# Patient Record
Sex: FEMALE | Race: BLACK OR AFRICAN AMERICAN | NOT HISPANIC OR LATINO | Employment: FULL TIME | URBAN - METROPOLITAN AREA
[De-identification: names, ages, dates, MRNs, and addresses within clinical notes are randomized per-mention and may not be internally consistent; named-entity substitution may affect disease eponyms.]

---

## 2017-05-19 ENCOUNTER — OFFICE VISIT (OUTPATIENT)
Dept: INTERNAL MEDICINE | Facility: CLINIC | Age: 32
End: 2017-05-19
Attending: INTERNAL MEDICINE
Payer: COMMERCIAL

## 2017-05-19 VITALS
DIASTOLIC BLOOD PRESSURE: 68 MMHG | OXYGEN SATURATION: 97 % | HEIGHT: 63 IN | SYSTOLIC BLOOD PRESSURE: 118 MMHG | HEART RATE: 72 BPM | BODY MASS INDEX: 37.97 KG/M2 | WEIGHT: 214.31 LBS

## 2017-05-19 DIAGNOSIS — E66.9 OBESITY (BMI 35.0-39.9 WITHOUT COMORBIDITY): ICD-10-CM

## 2017-05-19 DIAGNOSIS — E22.1 HYPERPROLACTINEMIA: ICD-10-CM

## 2017-05-19 DIAGNOSIS — Z00.00 ANNUAL PHYSICAL EXAM: Primary | ICD-10-CM

## 2017-05-19 DIAGNOSIS — Z86.39 HISTORY OF THYROID NODULE: ICD-10-CM

## 2017-05-19 PROCEDURE — 99395 PREV VISIT EST AGE 18-39: CPT | Mod: S$GLB,,, | Performed by: INTERNAL MEDICINE

## 2017-05-19 PROCEDURE — 99999 PR PBB SHADOW E&M-EST. PATIENT-LVL III: CPT | Mod: PBBFAC,,, | Performed by: INTERNAL MEDICINE

## 2017-05-19 NOTE — MR AVS SNAPSHOT
[facility]  [address]  [place] [address]  Phone: [phone]  Fax: [phone]                  Meghna Aragon   2017 11:20 AM   Office Visit    Description:  Female : 1985   Provider:  Maude Fernandez MD   Department:  [facility]           Reason for Visit     Establish Care     Annual Exam           Diagnoses this Visit        Comments    Annual physical exam    -  Primary     Hyperprolactinemia         Obesity (BMI 35.0-39.9 without comorbidity)         History of thyroid nodule                To Do List           Future Appointments        Provider Department Dept Phone    2017 8:30 AM LAB, SAME DAY [facility] [phone]    2017 12:00 PM [facility] [place] USOP2 [facility] [phone]      Goals (5 Years of Data)     None      Follow-Up and Disposition     Return in about 1 year (around 2018), or if symptoms worsen or fail to improve.      [facility]     [facility] Nurse Care Line -  Assistance  Unless otherwise directed by your provider, please contact [facility], our nurse care line that is available for  assistance.     Registered nurses in the [facility] provide: appointment scheduling, clinical advisement, health education, and other advisory services.  Call: [phone] (toll free)               Medications           Message regarding Medications     Verify the changes and/or additions to your medication regime listed below are the same as discussed with your clinician today.  If any of these changes or additions are incorrect, please notify your healthcare provider.             Verify that the below list of medications is an accurate representation of the medications you are currently taking.  If none reported, the list may be blank. If incorrect, please contact your healthcare provider. Carry this list with you in case of emergency.          "  Current Medications     norethindrone (ORTHO MICRONOR) 0.35 mg tablet Take 1 tablet (0.35 mg total) by mouth once daily.    PNV NO10/IRON FUM&P/FA/OMEGA-3 (PNV #10-IRON [id number]&[id number]-RT-[id number] ORAL) Take by mouth.           Clinical Reference Information           Your Vitals Were     BP Pulse Height Weight SpO2 BMI    118/68 72 5' 3" (1.6 m) 97.2 kg (214 lb 4.6 oz) 97% 37.96 kg/m2      Blood Pressure          Most Recent Value    BP  118/68      Allergies as of 5/19/2017     Codeine      Immunizations Administered on Date of Encounter - 5/19/2017     None      Orders Placed During Today's Visit      Normal Orders This Visit    Ambulatory Referral to Endocrinology     Future Labs/Procedures Expected by Expires    CBC auto differential  5/19/2017 5/19/2018    Comprehensive metabolic panel  5/19/2017 5/19/2018    Lipid panel  5/19/2017 5/19/2018    Prolactin  5/19/2017 7/18/2018    TSH  5/19/2017 5/19/2018    US Soft Tissue Head Neck Thyroid  5/19/2017 5/19/2018      Language Assistance Services     ATTENTION: Language assistance services are available, free of charge. Please call [phone].      ATENCIÓN: Si habla candace, tiene a shahid disposición servicios gratuitos de asistencia lingüística. Llame al [phone].     CHÚ Ý: N?u b?n nói Ti?ng Vi?t, có các d?ch v? h? tr? ngôn ng? mi?n phí dành cho b?n. G?i s? [phone].         [facility] complies with applicable Federal civil rights laws and does not discriminate on the basis of race, color, national origin, age, disability, or sex.        "

## 2017-05-19 NOTE — PROGRESS NOTES
"Subjective:       Patient ID: Meghna Aragon is a 32 y.o. female.    Chief Complaint: Establish Care and Annual Exam    HPI   Pt here to [facility] care and for annual exam.   Hx of hyperprolactinemia treated with bromocriptine in past - MRI neg for pit mass.   Stopped med when became pregnant. No longer taking this.   On ocp and last period was April 5 and was 4 days - not having period monthly   Reports hx of small thyroid nodules dx in high school - reports many years since had thyroid imaged. When initially dx per pt "they were too small to do anything about"  Feeling well overall. No complaints. No unexpected wt loss - not getting reg exercise and + difficulty with losing wt    Review of Systems   Constitutional: Positive for unexpected weight change. Negative for activity change.   HENT: Negative for hearing loss, rhinorrhea and trouble swallowing.    Eyes: Negative for discharge and visual disturbance.   Respiratory: Negative for chest tightness and wheezing.    Cardiovascular: Negative for chest pain and palpitations.   Gastrointestinal: Negative for blood in stool, constipation, diarrhea and vomiting.   Endocrine: Negative for polydipsia and polyuria.   Genitourinary: Positive for menstrual problem. Negative for difficulty urinating, dysuria and hematuria.   Musculoskeletal: Negative for arthralgias and joint swelling.   Neurological: Negative for weakness and headaches.   Psychiatric/Behavioral: Negative for confusion and dysphoric mood.       Objective:      Physical Exam   Constitutional: She is oriented to person, place, and time. She appears well-developed and well-nourished.   HENT:   Head: Normocephalic and atraumatic.   Right Ear: External ear normal.   Left Ear: External ear normal.   Nose: Nose normal.   Mouth/Throat: Oropharynx is clear and moist. No oropharyngeal exudate.   No carotid bruits   Eyes: Conjunctivae and EOM are normal. Pupils are equal, round, and reactive to light.   Neck: Neck " supple. No thyromegaly present.   Cardiovascular: Normal rate, regular rhythm, normal heart sounds and intact distal pulses.    Pulmonary/Chest: Effort normal and breath sounds normal.   Abdominal: Soft. Bowel sounds are normal.   Musculoskeletal: She exhibits no edema or tenderness.   Visual field test wnl on my exam   Lymphadenopathy:     She has no cervical adenopathy.   Neurological: She is alert and oriented to person, place, and time. Coordination normal.   Skin: Skin is warm and dry.   Psychiatric: She has a normal mood and affect. Her behavior is normal. Judgment and thought content normal.       Assessment:     Meghna was seen today for establish care and annual exam.    Diagnoses and all orders for this visit:    Annual physical exam  -     CBC auto differential; Future  -     Comprehensive metabolic panel; Future  -     TSH; Future  -     Lipid panel; Future  Recommend daily sunscreen, cardiovascular exercise min 30 min 5 days per week. Seatbelts routinely.    Hyperprolactinemia: if elevated will f/u with endocrine if new gyn does not prescribe bromocriptine and prolactin level is elevated - had MRI in 2015 and neg for pit mass  -     Prolactin; Future  -     Ambulatory Referral to Endocrinology    Obesity (BMI 35.0-39.9 without comorbidity):   - cont diet and exercise  - increase intensity and duration of CV exercise to continue weight loss  - goal wt loss one pound per week  - portion control, healthy choices    History of thyroid nodule  -     US Soft Tissue Head Neck Thyroid; Future

## 2017-05-24 ENCOUNTER — LAB VISIT (OUTPATIENT)
Dept: LAB | Facility: OTHER | Age: 32
End: 2017-05-24
Attending: INTERNAL MEDICINE
Payer: COMMERCIAL

## 2017-05-24 DIAGNOSIS — E22.1 HYPERPROLACTINEMIA: ICD-10-CM

## 2017-05-24 DIAGNOSIS — Z00.00 ANNUAL PHYSICAL EXAM: ICD-10-CM

## 2017-05-24 LAB
ALBUMIN SERPL BCP-MCNC: 3.7 G/DL
ALP SERPL-CCNC: 83 U/L
ALT SERPL W/O P-5'-P-CCNC: 16 U/L
ANION GAP SERPL CALC-SCNC: 14 MMOL/L
AST SERPL-CCNC: 22 U/L
BASOPHILS # BLD AUTO: 0.01 K/UL
BASOPHILS NFR BLD: 0.2 %
BILIRUB SERPL-MCNC: 0.2 MG/DL
BUN SERPL-MCNC: 14 MG/DL
CALCIUM SERPL-MCNC: 9.3 MG/DL
CHLORIDE SERPL-SCNC: 106 MMOL/L
CHOLEST/HDLC SERPL: 4.3 {RATIO}
CO2 SERPL-SCNC: 19 MMOL/L
CREAT SERPL-MCNC: 0.8 MG/DL
DIFFERENTIAL METHOD: ABNORMAL
EOSINOPHIL # BLD AUTO: 0.1 K/UL
EOSINOPHIL NFR BLD: 1.5 %
ERYTHROCYTE [DISTWIDTH] IN BLOOD BY AUTOMATED COUNT: 14.2 %
EST. GFR  (AFRICAN AMERICAN): >60 ML/MIN/1.73 M^2
EST. GFR  (NON AFRICAN AMERICAN): >60 ML/MIN/1.73 M^2
GLUCOSE SERPL-MCNC: 96 MG/DL
HCT VFR BLD AUTO: 38.3 %
HDL/CHOLESTEROL RATIO: 23 %
HDLC SERPL-MCNC: 230 MG/DL
HDLC SERPL-MCNC: 53 MG/DL
HGB BLD-MCNC: 12.2 G/DL
LDLC SERPL CALC-MCNC: 152.4 MG/DL
LYMPHOCYTES # BLD AUTO: 2.1 K/UL
LYMPHOCYTES NFR BLD: 32.3 %
MCH RBC QN AUTO: 26.8 PG
MCHC RBC AUTO-ENTMCNC: 31.9 %
MCV RBC AUTO: 84 FL
MONOCYTES # BLD AUTO: 0.4 K/UL
MONOCYTES NFR BLD: 5.9 %
NEUTROPHILS # BLD AUTO: 4 K/UL
NEUTROPHILS NFR BLD: 59.8 %
NONHDLC SERPL-MCNC: 177 MG/DL
PLATELET # BLD AUTO: 265 K/UL
PMV BLD AUTO: 11.9 FL
POTASSIUM SERPL-SCNC: 4.5 MMOL/L
PROLACTIN SERPL IA-MCNC: 56.9 NG/ML
PROT SERPL-MCNC: 7.4 G/DL
RBC # BLD AUTO: 4.56 M/UL
SODIUM SERPL-SCNC: 139 MMOL/L
TRIGL SERPL-MCNC: 123 MG/DL
TSH SERPL DL<=0.005 MIU/L-ACNC: 1.25 UIU/ML
WBC # BLD AUTO: 6.62 K/UL

## 2017-05-24 PROCEDURE — 84443 ASSAY THYROID STIM HORMONE: CPT

## 2017-05-24 PROCEDURE — 36415 COLL VENOUS BLD VENIPUNCTURE: CPT

## 2017-05-24 PROCEDURE — 84146 ASSAY OF PROLACTIN: CPT

## 2017-05-24 PROCEDURE — 85025 COMPLETE CBC W/AUTO DIFF WBC: CPT

## 2017-05-24 PROCEDURE — 80061 LIPID PANEL: CPT

## 2017-05-24 PROCEDURE — 80053 COMPREHEN METABOLIC PANEL: CPT

## 2017-05-25 ENCOUNTER — HOSPITAL ENCOUNTER (OUTPATIENT)
Dept: RADIOLOGY | Facility: OTHER | Age: 32
Discharge: HOME | End: 2017-05-25
Attending: INTERNAL MEDICINE
Payer: COMMERCIAL

## 2017-05-25 DIAGNOSIS — Z86.39 HISTORY OF THYROID NODULE: ICD-10-CM

## 2017-05-25 PROCEDURE — 76536 US EXAM OF HEAD AND NECK: CPT | Mod: 26,,, | Performed by: RADIOLOGY

## 2017-05-25 PROCEDURE — 76536 US EXAM OF HEAD AND NECK: CPT | Mod: TC

## 2017-06-02 ENCOUNTER — OFFICE VISIT (OUTPATIENT)
Dept: ENDOCRINOLOGY | Facility: CLINIC | Age: 32
End: 2017-06-02
Payer: COMMERCIAL

## 2017-06-02 VITALS
WEIGHT: 216.94 LBS | BODY MASS INDEX: 38.44 KG/M2 | RESPIRATION RATE: 16 BRPM | HEART RATE: 71 BPM | HEIGHT: 63 IN | SYSTOLIC BLOOD PRESSURE: 139 MMHG | DIASTOLIC BLOOD PRESSURE: 85 MMHG

## 2017-06-02 DIAGNOSIS — N64.3 GALACTORRHEA: ICD-10-CM

## 2017-06-02 DIAGNOSIS — Z86.39 HISTORY OF THYROID NODULE: ICD-10-CM

## 2017-06-02 DIAGNOSIS — E22.1 HYPERPROLACTINEMIA: Primary | ICD-10-CM

## 2017-06-02 PROCEDURE — 99204 OFFICE O/P NEW MOD 45 MIN: CPT | Mod: S$GLB,,, | Performed by: INTERNAL MEDICINE

## 2017-06-02 PROCEDURE — 99999 PR PBB SHADOW E&M-EST. PATIENT-LVL III: CPT | Mod: PBBFAC,,, | Performed by: INTERNAL MEDICINE

## 2017-06-02 NOTE — PROGRESS NOTES
Subjective:      Patient ID: Meghna Aragon is a 32 y.o. female.    Chief Complaint:  Hyperprolactinemia      History of Present Illness    Ms.Yasmin Duglas Aragon is seen as a new patient referred to me for hyperprolactinemia     She has an 8 months old baby boy     Works at [employer]     She was trying to get pregnant in 2014 for couple of years but did not get pregnant so she was evaluated for casues found to have slight increase in prolactin   Was started on bromocriptine 2.5 mg daily oral     She got pregnant through ivf     She had irregular menses   She was getting every other month - not consistent     She did have galactorrhea before the diagnosis     She was breastfeeding EBF for 3 motnhs, after that added formula, weaning off   Last BF 2 weeks ago            Review of Systems   Constitutional: Negative for unexpected weight change.   HENT: Negative for trouble swallowing and voice change.    Eyes: Negative for visual disturbance.   Respiratory: Negative for shortness of breath.    Gastrointestinal: Negative for constipation and diarrhea.   Genitourinary: Positive for menstrual problem (per hpi).   Neurological: Positive for headaches. Negative for dizziness.   Hematological: Does not bruise/bleed easily.   Psychiatric/Behavioral: Positive for sleep disturbance (due to baby not sleeping through the night ).       Objective:   Physical Exam   Constitutional: She appears well-developed.   HENT:   Right Ear: External ear normal.   Left Ear: External ear normal.   Nose: Nose normal.   Neck: No tracheal deviation present. No thyromegaly present.   Cardiovascular: Normal rate.    No murmur heard.  Pulmonary/Chest: Effort normal and breath sounds normal.   Abdominal: Soft. There is no tenderness. No hernia.   Musculoskeletal: She exhibits no edema.   Neurological: She displays normal reflexes. No cranial nerve deficit.   Skin: No rash noted.          Psychiatric: She has a normal mood and affect. Judgment  normal.   Vitals reviewed.      Lab Review:   Results for ANTHONY CEJA (MRN [id number]) as of 6/2/2017 11:36   Ref. Range 10/11/2014 08:50 10/14/2014 08:27 11/7/2014 08:04 11/21/2014 08:11 5/24/2017 08:10   FSH Latest Ref Range: See Text mIU/mL 1.80       Prolactin Latest Ref Range: 5.2 - 26.5 ng/mL 70.0 (H) 58.8 (H) 26.6 (H) 17.5 56.9 (H)       Procedure: MRI brain.  None.    Technique: Multiplanar, multi-sequence imaging was performed from the vertex to the skull base prior to and following the administration of 10 cc of intraveneous gadavist.  High-resolution images were obtained through the pituitary gland.    Findings: There is normal brain parenchymal signal intensity with no intra-axial or extra axial mass or hemorrhage.  There is no restricted diffusion to suggest acute ischemia or infarct.  There is no midline shift.  The gray-white matter differentiation   is intact.  The CSF containing spaces maintained normal size, symmetry and volume.  The midline structures are intact. Typically, there is no evidence of a pituitary lesion. The vascular flow voids are patent. Evaluation of the pneumatized aspects of   the skull and skull base show no abnormalities.  The osseous and soft tissue structures are normal.   Impression      Normal exam.      Electronically signed by: ROMINA HAWLEY MD  Date: 10/27/14  Time: 13:16      Sonographic evaluation of the thyroid gland was performed. The thyroid gland is normal in overall size with the right lobe of the thyroid gland measuring 5.3 x 1.5 x 1.4 cm and the left lobe of the thyroid gland measuring 4.6 x 1.7 x 1.1 cm. There is a mixed solid and cystic nodule identified within the caudal aspect of the right lobe of the thyroid gland measuring 0.5 cm in size. There is a small solid nodule identified within the medial aspect of the upper pole of the left lobe of the thyroid gland measuring 0.6 cm. These thyroid nodules do not meet the imaging criteria for  recommendation for biopsy at this time. There are normal size lymph nodes identified within the neck.   Impression       Normal size thyroid gland containing subcentimeter nodules as detailed above. Note that the thyroid nodules do not meet imaging criteria for recommendation for biopsy at this time.      Electronically signed by: GABRIELLE ROBERTS MD  Date: 05/25/17  Time: 14:36          Assessment:     1. Hyperprolactinemia  Prolactin   2. History of thyroid nodule          # hyperprolactinemia   She has h/o hyperprolactinemia prior to pregnnacy   currently in process of weaning of BF   We will wait till she is completely done weaning BF   Recheck prolactin prior to next visit   RTC in 6 months     # thyroid nodules   Repeat US tyroid in 1-2 years       Plan:

## 2017-10-27 ENCOUNTER — PATIENT MESSAGE (OUTPATIENT)
Dept: INTERNAL MEDICINE | Facility: CLINIC | Age: 32
End: 2017-10-27

## 2018-04-27 ENCOUNTER — OFFICE VISIT (OUTPATIENT)
Dept: INTERNAL MEDICINE | Facility: CLINIC | Age: 33
End: 2018-04-27
Attending: INTERNAL MEDICINE
Payer: COMMERCIAL

## 2018-04-27 ENCOUNTER — TELEPHONE (OUTPATIENT)
Dept: INTERNAL MEDICINE | Facility: CLINIC | Age: 33
End: 2018-04-27

## 2018-04-27 VITALS
HEART RATE: 80 BPM | HEIGHT: 63 IN | DIASTOLIC BLOOD PRESSURE: 81 MMHG | BODY MASS INDEX: 35.16 KG/M2 | WEIGHT: 198.44 LBS | SYSTOLIC BLOOD PRESSURE: 124 MMHG | OXYGEN SATURATION: 98 %

## 2018-04-27 DIAGNOSIS — E04.1 THYROID NODULE: ICD-10-CM

## 2018-04-27 DIAGNOSIS — E78.5 HYPERLIPIDEMIA, UNSPECIFIED HYPERLIPIDEMIA TYPE: ICD-10-CM

## 2018-04-27 DIAGNOSIS — Z00.00 ANNUAL PHYSICAL EXAM: Primary | ICD-10-CM

## 2018-04-27 DIAGNOSIS — Z82.49 FAMILY HISTORY OF HEART DISEASE: Primary | ICD-10-CM

## 2018-04-27 DIAGNOSIS — E22.1 HYPERPROLACTINEMIA: ICD-10-CM

## 2018-04-27 DIAGNOSIS — Z82.49 FAMILY HISTORY OF EARLY CAD: ICD-10-CM

## 2018-04-27 PROCEDURE — 99999 PR PBB SHADOW E&M-EST. PATIENT-LVL IV: CPT | Mod: PBBFAC,,, | Performed by: INTERNAL MEDICINE

## 2018-04-27 PROCEDURE — 99395 PREV VISIT EST AGE 18-39: CPT | Mod: S$GLB,,, | Performed by: INTERNAL MEDICINE

## 2018-04-27 NOTE — PROGRESS NOTES
"Subjective:   Patient ID: Meghna Aragon is a 32 y.o. female  Chief complaint:   Chief Complaint   Patient presents with    Annual Exam       HPI   Pt here for annual exam     She is concerned about family hx of CAD  Dad with hx of MI at 44 yoa  1/2 brother had MI s/p stents at age 32 - this occurred April 1, 2018 - she reports he was involved in ilicit drugs but unsure if cocaine or other stimulants and if this contributed to MI    HLD: reviewed labs from last year  Anticipating pregnancy in the next year    Elevated proloactin: did see endocrine 6/2017   No galactorrhea at this time. No longer breastfeeding - is due to f/u    Thyroid nodules: due for repeat us 5/25/2018, last tsh wnl    Gyn: needs to est care    Has improved diet - eating healthy diet and lost wt due to healthier lifestyle  She denies any cp or tightness or sob    Review of Systems    Objective:  Vitals:    04/27/18 0857   BP: 124/81   Pulse: 80   SpO2: 98%   Weight: 90 kg (198 lb 6.6 oz)   Height: 5' 3" (1.6 m)     Body mass index is 35.15 kg/m².    Physical Exam   Constitutional: She is oriented to person, place, and time. She appears well-developed and well-nourished.   HENT:   Head: Normocephalic and atraumatic.   Right Ear: External ear normal.   Left Ear: External ear normal.   Nose: Nose normal.   Mouth/Throat: Oropharynx is clear and moist. No oropharyngeal exudate.   No carotid bruits   Eyes: Conjunctivae and EOM are normal.   Neck: Neck supple. No thyromegaly present.   Cardiovascular: Normal rate, regular rhythm, normal heart sounds and intact distal pulses.    Pulmonary/Chest: Effort normal and breath sounds normal.   Abdominal: Soft. Bowel sounds are normal.   Musculoskeletal: She exhibits no edema or tenderness.   Lymphadenopathy:     She has no cervical adenopathy.   Neurological: She is alert and oriented to person, place, and time.   Skin: Skin is warm and dry.   Psychiatric: Her behavior is normal. Thought content normal. "   Vitals reviewed.      Assessment:  1. Annual physical exam    2. Hyperprolactinemia    3. Thyroid nodule    4. Hyperlipidemia, unspecified hyperlipidemia type    5. Family history of early CAD        Plan:  Meghna was seen today for annual exam.    Diagnoses and all orders for this visit:    Annual physical exam  -     Ambulatory Referral to Obstetrics / Gynecology  -     Lipid panel; Future  -     CBC auto differential; Future  -     Comprehensive metabolic panel; Future  -     TSH; Future  Recommend daily sunscreen, cardiovascular exercise min 30 min 5 days per week. Seatbelts routinely.    Hyperprolactinemia  -     Ambulatory Referral to Endocrinology  Check level     Thyroid nodule  -     US Soft Tissue Head Neck Thyroid; Future    HLD: consider treatment in future after pregnancy/breast feeding due to early family hx of CAD and HLD - I recommend regular exercise along with a diet low in fat and cholesterol and high in fiber and fresh fruits and vegetables.     Family hx of CAD: as above     Health Maintenance   Topic Date Due    Pap Smear with HPV Cotest  11/10/2019    TETANUS VACCINE  09/01/2026    Influenza Vaccine  Completed    Lipid Panel  Completed

## 2018-04-27 NOTE — TELEPHONE ENCOUNTER
Pt stated that during her visit her and PCP discussed her family history and recent family issues and she stated that she forgot to ask PCP if an echo test can be put in for her to complete   .  Order pending (check to make sure its the correct one)  Please authorize and advise

## 2018-05-04 ENCOUNTER — HOSPITAL ENCOUNTER (OUTPATIENT)
Dept: CARDIOLOGY | Facility: OTHER | Age: 33
Discharge: HOME | End: 2018-05-04
Attending: INTERNAL MEDICINE
Payer: COMMERCIAL

## 2018-05-04 DIAGNOSIS — Z82.49 FAMILY HISTORY OF HEART DISEASE: ICD-10-CM

## 2018-05-04 LAB
DIASTOLIC DYSFUNCTION: NO
GLOBAL PERICARDIAL EFFUSION: NORMAL
RETIRED EF AND QEF - SEE NOTES: 60 (ref 55–65)

## 2018-05-04 PROCEDURE — 93306 TTE W/DOPPLER COMPLETE: CPT

## 2018-05-18 ENCOUNTER — HOSPITAL ENCOUNTER (OUTPATIENT)
Dept: RADIOLOGY | Facility: OTHER | Age: 33
Discharge: HOME | End: 2018-05-18
Attending: INTERNAL MEDICINE
Payer: COMMERCIAL

## 2018-05-18 DIAGNOSIS — E04.1 THYROID NODULE: ICD-10-CM

## 2018-05-18 PROCEDURE — 76536 US EXAM OF HEAD AND NECK: CPT | Mod: 26,,, | Performed by: INTERNAL MEDICINE

## 2018-05-18 PROCEDURE — 76536 US EXAM OF HEAD AND NECK: CPT | Mod: TC

## 2018-06-26 ENCOUNTER — OFFICE VISIT (OUTPATIENT)
Dept: INTERNAL MEDICINE | Facility: CLINIC | Age: 33
End: 2018-06-26
Payer: COMMERCIAL

## 2018-06-26 VITALS
SYSTOLIC BLOOD PRESSURE: 114 MMHG | DIASTOLIC BLOOD PRESSURE: 86 MMHG | HEIGHT: 63 IN | HEART RATE: 72 BPM | WEIGHT: 198.44 LBS | BODY MASS INDEX: 35.16 KG/M2 | OXYGEN SATURATION: 99 %

## 2018-06-26 DIAGNOSIS — H10.9 BACTERIAL CONJUNCTIVITIS OF RIGHT EYE: Primary | ICD-10-CM

## 2018-06-26 PROCEDURE — 99999 PR PBB SHADOW E&M-EST. PATIENT-LVL III: CPT | Mod: PBBFAC,,, | Performed by: INTERNAL MEDICINE

## 2018-06-26 PROCEDURE — 99213 OFFICE O/P EST LOW 20 MIN: CPT | Mod: S$GLB,,, | Performed by: INTERNAL MEDICINE

## 2018-06-26 RX ORDER — POLYMYXIN B SULFATE AND TRIMETHOPRIM 1; 10000 MG/ML; [USP'U]/ML
1 SOLUTION OPHTHALMIC EVERY 6 HOURS
Qty: 1 BOTTLE | Refills: 0 | Status: SHIPPED | OUTPATIENT
Start: 2018-06-26 | End: 2018-07-03

## 2018-06-26 NOTE — LETTER
June 26, 2018      [facility]  [address]  [place] [address]  Phone: [phone]  Fax: [phone]       Patient: Meghna Aragon   YOB: 1985  Date of Visit: 06/26/2018    To Whom It May Concern:    Sandro Aragon  was at [facility] on 06/26/2018. She may return to work/school on 06/27/2018 with restrictions. If you have any questions or concerns, or if I can be of further assistance, please do not hesitate to contact me.    Sincerely,    Tianna Correia MA

## 2018-06-26 NOTE — PROGRESS NOTES
Subjective:       Patient ID: Meghna Aragon is a 33 y.o. female.    Chief Complaint: Eye Drainage (eye draining , itching, yellow mucus, irritated )    HPI Mrs. Aragon is a 33-year-old female who presents with a chief complaint of a red itchy eye.  The patient states that she began having a red itchy eye yesterday after lunch.  She then noted that there was stuff coming out of the eye.  She tried flushing her eye but this did not help much.  Her eye was crusted over this morning.  She states that it is uncomfortable and there is a burning feeling, like a dryness.  It does feel like there is something in the eye.  She denies any trauma to the eye.  She does not wear contacts.  She denies any vision changes such as blurry vision or double vision.  She denies any associated fever.  On review of systems she admits to some recent nasal congestion, runny nose, and cough.  Also had a itchy throat over the weekend.  She thinks that her red eye may be due to ALLERGY problems.  She has not had red eyes due to ALLERGIES in the past.  She takes Claritin for her ALLERGIES.    Review of Systems   Constitutional: Negative for fever.   HENT: Positive for congestion and rhinorrhea. Negative for sore throat.    Eyes: Positive for discharge, redness and itching. Negative for pain and visual disturbance.   Respiratory: Positive for cough.        Objective:      Physical Exam   Constitutional: She is oriented to person, place, and time. She appears well-developed and well-nourished. No distress.   HENT:   Head: Normocephalic and atraumatic.   Nose: Mucosal edema and rhinorrhea present.   Mouth/Throat: Posterior oropharyngeal erythema (mild) present. No oropharyngeal exudate or posterior oropharyngeal edema.   Eyes: Conjunctivae and EOM are normal. Pupils are equal, round, and reactive to light. Right eye exhibits discharge (clear). Left eye exhibits no discharge. No scleral icterus.   Extraocular movements are intact and  movements are not painful.  Patient has a clear discharge from the right eye.  She has injected sclera and conjunctiva in the right eye.  Pupil was equal round and reactive to light.  Vision is intact and clear in the right eye.  Patient has a normal funduscopic exam with a clear circular optic disc.  No foreign body.    Mild injection of sclera and conjunctiva noted in the left eye.  Otherwise no further abnormalities of the left eye.   Neurological: She is alert and oriented to person, place, and time.   Skin: Skin is warm and dry. She is not diaphoretic.   Psychiatric: She has a normal mood and affect. Her behavior is normal. Judgment and thought content normal.   Vitals reviewed.      Assessment:       1. Bacterial conjunctivitis of right eye        Plan:     #1 bacterial conjunctivitis of right eye  Polytrim eye drops, right eye, Q6H X one week  Advised her to discard any makeup used around the eye in the last 48 hours  Advised her to avoid touching both eyes and thus possibly spreading infection  Apply warm compress to eye for 30 min, TID  Discussed with patient that it could be viral in origin, in which case, there is no treatment and the left eye may become inflamed soon as well    RTC PRN

## 2018-06-26 NOTE — PATIENT INSTRUCTIONS
Polymyxin B; Trimethoprim eye drops, solution  What is this medicine?  POLYMYXIN B and TRIMETHOPRIM (suzie i MIX in B and trye METH oh prim) eye drops treat certain eye infections caused by bacteria.  How should I use this medicine?  This medicine is used in the eye. Follow the directions on the prescription label. Wash your hands before and after use. Tilt your head back slightly. Pull your lower eyelid down gently to form a pouch. Do not touch the tip of the dropper to your eye, fingertips, or other surface. Squeeze the prescribed number of drops into the pouch. Close the eye gently to spread the drops. Use your medicine at regular intervals. Do not take your medicine more often than directed. Use all of your medicine as directed even if you think your are better. Do not skip doses or stop your medicine early.  Talk to your pediatrician regarding the use of this medicine in children. While this drug may be prescribed for children and infants for selected conditions, precautions do apply.  What side effects may I notice from receiving this medicine?  Side effects that you should report to your doctor or health care professional as soon as possible:  · burning, stinging, or swelling  · change in vision or blurred vision that will not go away  · eye pain  · itching and redness  · rash  Side effects that usually do not require medical attention (report to your doctor or health care professional if they continue or are bothersome):  · temporary blurred vision after applying  · temporary watering or stinging  What may interact with this medicine?  Interactions are not expected. Do not use any other eye products without advice of your doctor or health care professional.  What if I miss a dose?  If you miss a dose, use it as soon as you can. If it is almost time for your next dose, use only that dose. Do not use double or extra doses.  Where should I keep my medicine?  Keep out of the reach of children.  Store at room  temperature 15 to 25 degrees C (59 to 77 degrees F). Protect from light. To prevent the spread of infection, it is best to throw away any unused eye drops after you finish the course of treatment. Throw away any unused medicine after the expiration date.  What should I tell my health care provider before I take this medicine?  They need to know if you have any of these conditions:  · wear contact lenses  · an unusual or allergic reaction to polymyxin B, trimethoprim, other medicines, foods, dyes, or preservatives  · pregnant or trying to get pregnant  · breast-feeding  What should I watch for while using this medicine?  Check with your doctor or health care professional if your condition does not get better after 5 days, or if it gets worse.  If you wear contact lenses, ask when you can use your lenses again.  A burning or stinging reaction that does not go away may mean you are allergic to this product. Stop use and call your doctor or health care professional.  To prevent the spread of infection, do not share eye products or other personal items with anyone else.  NOTE:This sheet is a summary. It may not cover all possible information. If you have questions about this medicine, talk to your doctor, pharmacist, or health care provider. Copyright© 2017 [employer]    I recommend using over the counter Systane Ultra eye drops for lubrication.

## 2018-08-01 ENCOUNTER — OFFICE VISIT (OUTPATIENT)
Dept: ENDOCRINOLOGY | Facility: CLINIC | Age: 33
End: 2018-08-01
Payer: COMMERCIAL

## 2018-08-01 VITALS
HEIGHT: 63 IN | HEART RATE: 78 BPM | BODY MASS INDEX: 34.96 KG/M2 | WEIGHT: 197.31 LBS | SYSTOLIC BLOOD PRESSURE: 128 MMHG | DIASTOLIC BLOOD PRESSURE: 90 MMHG

## 2018-08-01 DIAGNOSIS — E22.1 HYPERPROLACTINEMIA: Primary | ICD-10-CM

## 2018-08-01 DIAGNOSIS — E04.1 THYROID NODULE: ICD-10-CM

## 2018-08-01 PROBLEM — Z86.39 HISTORY OF THYROID NODULE: Status: RESOLVED | Noted: 2017-05-19 | Resolved: 2018-08-01

## 2018-08-01 PROCEDURE — 99999 PR PBB SHADOW E&M-EST. PATIENT-LVL III: CPT | Mod: PBBFAC,,, | Performed by: INTERNAL MEDICINE

## 2018-08-01 PROCEDURE — 99213 OFFICE O/P EST LOW 20 MIN: CPT | Mod: S$GLB,,, | Performed by: INTERNAL MEDICINE

## 2018-08-01 NOTE — PROGRESS NOTES
Subjective:      Patient ID: Meghna Aragon is a 33 y.o. female.    Chief Complaint:  Thyroid Problem      History of Present Illness  Ms.Yasmin Duglas Aragon presents for follow up of hyperprolactinemia and thyroid nodules. Last visit with Dr. Norman in 6/2017.     Was trying to conceive for one year. Had fertility eval which included prolactin. Prolactin was found to be elevated. Pituitary MRI was negative in 2014. She was on bromocriptine 2.5 mg daily up until Jan 2016 when she conceived.  was ultimately found to have low sperm count and the patient underwent IVF in Jan 2016.     At her last visit in 5/2017 prolactin was elevated but she was still breastfeeding. Stopped breastfeeding in 6/2017.     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      She got pregnant through ivf      Has monthly cycles occurring every 32 days.      Has intermittent breast tenderness. Occ galactorrhea.    Also found to have small thyroid nodules on ultrasound.   No family history of thyroid cancer.     Thyroid ultrasound 5/2018:  The thyroid gland is upper normal size measuring 4.8 x 1.6 x 2.2 cm on the right and 4.6 x 1.4 x 2 cm on the left.  Each lobe of the thyroid contains a single subcentimeter nodule.  There is no nodule which meets criteria for fine-needle aspiration or follow-up.  There is no abnormal lymphadenopathy in the neck.      Impression       Two subcentimeter thyroid nodules, not meeting criteria for fine-needle aspiration or follow-up ultrasound.       Review of Systems   Constitutional: Negative for chills and fever.   Gastrointestinal: Negative for nausea.   No CP  No SOB    Objective:   Physical Exam   Nursing note and vitals reviewed.  No thyromegaly  DTR's 2 +  No tremor  Heart RRR  No edema    Lab Review:   Results for MEGHNA ARAGON (MRN [id number]) as of 8/1/2018 11:22   Ref. Range 5/24/2017 08:10 5/18/2018 07:07   TSH Latest Ref Range: 0.400 - 4.000 uIU/mL 1.248 0.989   Prolactin Latest  Ref Range: 5.2 - 26.5 ng/mL 56.9 (H)        Assessment:     1. Hyperprolactinemia    2. Thyroid nodule      Plan:     1.) Patient with idiopathic hyperprolactinemia  --Menstrual cycles are regular at present  --She does have some occasional galactorrhea and breast tenderness but not bothersome  --Will repeat prolactin now  --If normal or minimally elevated will monitor  --No need to repeat pituitary MRI unless prolactin significantly elevated  --Explained to her that should the prolactin be elevated and she plans pregnancy again would consider restarting bromocriptine or cabergoline at that time    2.) Repeat thyroid ultrasound in 18-24 months  --No nodules meet FNA criteria  --No risk factors for thyroid cancer    Omar Recinos M.D. Staff Endocrinology

## 2018-08-03 ENCOUNTER — LAB VISIT (OUTPATIENT)
Dept: LAB | Facility: OTHER | Age: 33
End: 2018-08-03
Payer: COMMERCIAL

## 2018-08-03 DIAGNOSIS — E22.1 HYPERPROLACTINEMIA: ICD-10-CM

## 2018-08-03 LAB — PROLACTIN SERPL IA-MCNC: 55.1 NG/ML

## 2018-08-03 PROCEDURE — 36415 COLL VENOUS BLD VENIPUNCTURE: CPT

## 2018-08-03 PROCEDURE — 84146 ASSAY OF PROLACTIN: CPT

## 2018-08-08 ENCOUNTER — PATIENT MESSAGE (OUTPATIENT)
Dept: ENDOCRINOLOGY | Facility: CLINIC | Age: 33
End: 2018-08-08

## 2018-08-08 DIAGNOSIS — E22.1 HYPERPROLACTINEMIA: Primary | ICD-10-CM

## 2018-08-09 ENCOUNTER — PATIENT MESSAGE (OUTPATIENT)
Dept: ENDOCRINOLOGY | Facility: CLINIC | Age: 33
End: 2018-08-09

## 2018-08-24 ENCOUNTER — PATIENT MESSAGE (OUTPATIENT)
Dept: ENDOCRINOLOGY | Facility: CLINIC | Age: 33
End: 2018-08-24

## 2018-08-24 ENCOUNTER — HOSPITAL ENCOUNTER (OUTPATIENT)
Dept: RADIOLOGY | Facility: HOSPITAL | Age: 33
Discharge: HOME | End: 2018-08-24
Attending: INTERNAL MEDICINE
Payer: COMMERCIAL

## 2018-08-24 DIAGNOSIS — E22.1 HYPERPROLACTINEMIA: ICD-10-CM

## 2018-08-24 PROCEDURE — A9585 GADOBUTROL INJECTION: HCPCS | Performed by: INTERNAL MEDICINE

## 2018-08-24 PROCEDURE — 70553 MRI BRAIN STEM W/O & W/DYE: CPT | Mod: TC

## 2018-08-24 PROCEDURE — 70553 MRI BRAIN STEM W/O & W/DYE: CPT | Mod: 26,,, | Performed by: RADIOLOGY

## 2018-08-24 PROCEDURE — 25500020 PHARM REV CODE 255: Performed by: INTERNAL MEDICINE

## 2018-08-24 RX ORDER — GADOBUTROL 604.72 MG/ML
4 INJECTION INTRAVENOUS
Status: COMPLETED | OUTPATIENT
Start: 2018-08-24 | End: 2018-08-24

## 2018-08-24 RX ADMIN — GADOBUTROL 4 ML: 604.72 INJECTION INTRAVENOUS at 04:08

## 2018-08-30 ENCOUNTER — PATIENT MESSAGE (OUTPATIENT)
Dept: ENDOCRINOLOGY | Facility: CLINIC | Age: 33
End: 2018-08-30

## 2018-08-30 DIAGNOSIS — E22.1 HYPERPROLACTINEMIA: Primary | ICD-10-CM

## 2018-08-30 RX ORDER — CABERGOLINE 0.5 MG/1
0.25 TABLET ORAL
Qty: 4 TABLET | Refills: 5 | Status: SHIPPED | OUTPATIENT
Start: 2018-08-30 | End: 2019-03-06

## 2018-09-04 ENCOUNTER — PATIENT MESSAGE (OUTPATIENT)
Dept: ENDOCRINOLOGY | Facility: CLINIC | Age: 33
End: 2018-09-04

## 2018-10-05 ENCOUNTER — LAB VISIT (OUTPATIENT)
Dept: LAB | Facility: OTHER | Age: 33
End: 2018-10-05
Payer: COMMERCIAL

## 2018-10-05 ENCOUNTER — PATIENT MESSAGE (OUTPATIENT)
Dept: ENDOCRINOLOGY | Facility: CLINIC | Age: 33
End: 2018-10-05

## 2018-10-05 DIAGNOSIS — E22.1 HYPERPROLACTINEMIA: ICD-10-CM

## 2018-10-05 LAB — PROLACTIN SERPL IA-MCNC: 6.1 NG/ML

## 2018-10-05 PROCEDURE — 84146 ASSAY OF PROLACTIN: CPT

## 2018-10-05 PROCEDURE — 36415 COLL VENOUS BLD VENIPUNCTURE: CPT

## 2019-03-05 NOTE — PROGRESS NOTES
HISTORY OF PRESENT ILLNESS:    Meghna Aragon is a 33 y.o. female, , Patient's last menstrual period was 2018.,  presents for a routine exam and has no complaints. PAP DUE AND SUBMITTED.  ALSO REPORTS POSITIVE PREGNANCY TEST - BASED LMP, EDC 2019  PROLACTINOMA - STOP CABERGOLINE THROUGH PREGNANCY.  HAD T2 AND T3 HEADACHES - FIORICET PRN.    CaM DELIV SON 2016, 3RD DEGREE 40W INDX.  O POS    LAST VISIT CaM  PP:  Meghna Aragon is a 31 y.o. female  presents for a postpartum visit.  She is status post  6 weeks ago.  Her hospitalization was not complicated.  She is breastfeeding.  She desires oral progesterone-only contraceptive for contraception.  She denies postpartum depression.  She is no longer bleeding.    Past Medical History:   Diagnosis Date    Anemia     Hyperprolactinemia     MRI neg for pit mass    Hyperprolactinemia 2017    Multiple thyroid nodules     Ovarian cyst 2016    dermoid on MRI       Past Surgical History:   Procedure Laterality Date    MOUTH SURGERY         MEDICATIONS AND ALLERGIES:      Current Outpatient Medications:     PNV NO10/IRON FUM&P/FA/OMEGA-3 (PNV #10-IRON [id number] ORAL), Take by mouth., Disp: , Rfl:     Review of patient's allergies indicates:   Allergen Reactions    Codeine Itching       Family History   Problem Relation Age of Onset    Heart attacks under age 50 Father 44    Heart disease Father     Heart disease Paternal Grandmother     Hypertension Mother     Thyroid disease Mother     Heart disease Paternal Aunt     Heart disease Paternal Uncle     No Known Problems Sister     No Known Problems Brother     No Known Problems Son     No Known Problems Brother     No Known Problems Brother     No Known Problems Brother     Cancer Neg Hx     Diabetes Neg Hx     Stroke Neg Hx     Thyroid cancer Neg Hx     Breast cancer Neg Hx     Colon cancer Neg Hx     Ovarian cancer Neg Hx        Social  History     Socioeconomic History    Marital status:      Spouse name: Not on file    Number of children: Not on file    Years of education: Not on file    Highest education level: Not on file   Social Needs    Financial resource strain: Not on file    Food insecurity - worry: Not on file    Food insecurity - inability: Not on file    Transportation needs - medical: Not on file    Transportation needs - non-medical: Not on file   Occupational History    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  Infection History: Denies STDs. Denies PID.  Benign History: Denies uterine fibroids. Denies ovarian cysts. Denies endometriosis. Denies other conditions.  Cancer History: Denies cervical cancer. Denies uterine cancer or hyperplasia. Denies ovarian cancer. Denies vulvar cancer or pre-cancer. Denies vaginal cancer or pre-cancer. Denies breast cancer. Denies colon cancer.  Sexual Activity History: Reports currently being sexually active  Menstrual History: Monthly, mild-moderate.  Contraception:no method    ROS:  GENERAL: No weight changes. No swelling. No fatigue. No fever.  CARDIOVASCULAR: No chest pain. No shortness of breath. No leg cramps.   NEUROLOGICAL: No headaches. No vision changes.  BREASTS: No pain. No lumps. No discharge.  ABDOMEN: No pain. No nausea. No vomiting. No diarrhea. No constipation.  REPRODUCTIVE: No abnormal bleeding.   VULVA: No pain. No lesions. No itching.  VAGINA: No relaxation. No itching. No odor. No discharge. No lesions.  URINARY: No incontinence. No nocturia. No frequency. No dysuria.    /60   Wt 92.2 kg (203  lb 4.2 oz)   LMP 11/23/2018   BMI 36.01 kg/m²     PE:  APPEARANCE: Well nourished, well developed, in no acute distress.  AFFECT: WNL, alert and oriented x 3.  SKIN: No acne or hirsutism.  NECK: Neck symmetric, without masses or thyromegaly.  NODES: No inguinal, cervical, axillary or femoral lymph node enlargement.  CHEST: Good respiratory effort.   ABDOMEN: Soft. No tenderness or masses. No hepatosplenomegaly. No hernias.  BREASTS: Symmetrical, no skin changes, visible lesions, palpable masses or nipple discharge bilaterally.  PELVIC: External female genitalia without lesions.  Female hair distribution. Adequate perineal body, Normal urethral meatus. Vagina moist and well rugated without lesions or discharge.  No significant cystocele or rectocele present. Cervix pink without lesions, discharge or tenderness. Uterus is 14 WEEKS size, regular, mobile and nontender. Adnexa without masses or tenderness.  EXTREMITIES: No edema    PROCEDURES:  Pap    DIAGNOSIS:  1. Visit for gynecologic examination  Connected MOM Enrollment   2. Pregnancy with 14 completed weeks gestation  CBC auto differential    Type & Screen - Ob Profile    Rubella antibody, IgG    HIV 1/2 Ag/Ab (4th Gen)    RPR    Hepatitis B surface antigen    Urine culture    C. trachomatis/N. gonorrhoeae by AMP DNA    US MFM Procedure ([facility])    Liquid-based pap smear, screening       LABS AND TESTS ORDERED:    MEDICATIONS PRESCRIBED:    COUNSELING:   The patient was counseled today on ACS PAP guidelines, with recommendations for yearly pelvic exams unless their uterus, cervix, and ovaries were removed for benign reasons; in that case, examinations every 3-5 years are recommended.  The patient was also counseled regarding monthly breast self-examination, routine STD screening for at-risk populations, prophylactic immunizations for transmitted infections such as  HPV, Pertussis, or Influenza as appropriate, and yearly mammograms when indicated by ACS  guidelines.  She was advised to see her primary care physician for all other health maintenance.    FOLLOW-UP with me annually.

## 2019-03-06 ENCOUNTER — INITIAL PRENATAL (OUTPATIENT)
Dept: OBSTETRICS AND GYNECOLOGY | Facility: CLINIC | Age: 34
End: 2019-03-06
Payer: COMMERCIAL

## 2019-03-06 VITALS
BODY MASS INDEX: 36.01 KG/M2 | WEIGHT: 203.25 LBS | DIASTOLIC BLOOD PRESSURE: 60 MMHG | SYSTOLIC BLOOD PRESSURE: 122 MMHG

## 2019-03-06 DIAGNOSIS — Z01.419 VISIT FOR GYNECOLOGIC EXAMINATION: Primary | ICD-10-CM

## 2019-03-06 DIAGNOSIS — Z3A.14 PREGNANCY WITH 14 COMPLETED WEEKS GESTATION: ICD-10-CM

## 2019-03-06 LAB
C TRACH DNA SPEC QL NAA+PROBE: NOT DETECTED
N GONORRHOEA DNA SPEC QL NAA+PROBE: NOT DETECTED

## 2019-03-06 PROCEDURE — 99395 PR PREVENTIVE VISIT,EST,18-39: ICD-10-PCS | Mod: S$GLB,,, | Performed by: OBSTETRICS & GYNECOLOGY

## 2019-03-06 PROCEDURE — 87491 CHLMYD TRACH DNA AMP PROBE: CPT

## 2019-03-06 PROCEDURE — 87086 URINE CULTURE/COLONY COUNT: CPT

## 2019-03-06 PROCEDURE — 99999 PR PBB SHADOW E&M-EST. PATIENT-LVL II: ICD-10-PCS | Mod: PBBFAC,,, | Performed by: OBSTETRICS & GYNECOLOGY

## 2019-03-06 PROCEDURE — 88175 CYTOPATH C/V AUTO FLUID REDO: CPT

## 2019-03-06 PROCEDURE — 99395 PREV VISIT EST AGE 18-39: CPT | Mod: S$GLB,,, | Performed by: OBSTETRICS & GYNECOLOGY

## 2019-03-06 PROCEDURE — 99999 PR PBB SHADOW E&M-EST. PATIENT-LVL II: CPT | Mod: PBBFAC,,, | Performed by: OBSTETRICS & GYNECOLOGY

## 2019-03-07 LAB
BACTERIA UR CULT: NORMAL
BACTERIA UR CULT: NORMAL

## 2019-03-08 ENCOUNTER — PATIENT MESSAGE (OUTPATIENT)
Dept: ADMINISTRATIVE | Facility: OTHER | Age: 34
End: 2019-03-08

## 2019-03-08 ENCOUNTER — LAB VISIT (OUTPATIENT)
Dept: LAB | Facility: OTHER | Age: 34
End: 2019-03-08
Attending: OBSTETRICS & GYNECOLOGY
Payer: COMMERCIAL

## 2019-03-08 ENCOUNTER — PATIENT MESSAGE (OUTPATIENT)
Dept: OBSTETRICS AND GYNECOLOGY | Facility: CLINIC | Age: 34
End: 2019-03-08

## 2019-03-08 DIAGNOSIS — Z3A.14 PREGNANCY WITH 14 COMPLETED WEEKS GESTATION: ICD-10-CM

## 2019-03-08 LAB
ABO + RH BLD: NORMAL
BASOPHILS # BLD AUTO: 0.01 K/UL
BASOPHILS NFR BLD: 0.1 %
BLD GP AB SCN CELLS X3 SERPL QL: NORMAL
DIFFERENTIAL METHOD: ABNORMAL
EOSINOPHIL # BLD AUTO: 0.1 K/UL
EOSINOPHIL NFR BLD: 1.2 %
ERYTHROCYTE [DISTWIDTH] IN BLOOD BY AUTOMATED COUNT: 14.3 %
HCT VFR BLD AUTO: 37.5 %
HGB BLD-MCNC: 11.9 G/DL
LYMPHOCYTES # BLD AUTO: 1.5 K/UL
LYMPHOCYTES NFR BLD: 16.6 %
MCH RBC QN AUTO: 25.9 PG
MCHC RBC AUTO-ENTMCNC: 31.7 G/DL
MCV RBC AUTO: 82 FL
MONOCYTES # BLD AUTO: 0.7 K/UL
MONOCYTES NFR BLD: 7.4 %
NEUTROPHILS # BLD AUTO: 6.6 K/UL
NEUTROPHILS NFR BLD: 74.5 %
PLATELET # BLD AUTO: 271 K/UL
PMV BLD AUTO: 11.9 FL
RBC # BLD AUTO: 4.6 M/UL
WBC # BLD AUTO: 8.92 K/UL

## 2019-03-08 PROCEDURE — 86901 BLOOD TYPING SEROLOGIC RH(D): CPT

## 2019-03-08 PROCEDURE — 86703 HIV-1/HIV-2 1 RESULT ANTBDY: CPT

## 2019-03-08 PROCEDURE — 86762 RUBELLA ANTIBODY: CPT

## 2019-03-08 PROCEDURE — 87340 HEPATITIS B SURFACE AG IA: CPT

## 2019-03-08 PROCEDURE — 36415 COLL VENOUS BLD VENIPUNCTURE: CPT

## 2019-03-08 PROCEDURE — 85025 COMPLETE CBC W/AUTO DIFF WBC: CPT

## 2019-03-08 PROCEDURE — 86592 SYPHILIS TEST NON-TREP QUAL: CPT

## 2019-03-11 LAB
HBV SURFACE AG SERPL QL IA: NEGATIVE
HIV 1+2 AB+HIV1 P24 AG SERPL QL IA: NEGATIVE
RPR SER QL: NORMAL
RUBV IGG SER-ACNC: 29.4 IU/ML
RUBV IGG SER-IMP: REACTIVE

## 2019-03-12 ENCOUNTER — PATIENT OUTREACH (OUTPATIENT)
Dept: OTHER | Facility: OTHER | Age: 34
End: 2019-03-12

## 2019-03-12 NOTE — TELEPHONE ENCOUNTER
Initial introduction with [facility] Meghna Duglas Aragon completed and the role of the health coaches for Connected MOM was explained via [facility].    Reviewed the importance of taking weights and blood pressure readings, using the health  as a resource for physical activity and dietary habits, and that [facility] messages will be sent for weeks 20, 30, and 37 home urine tests.  Reviewed that the patient should contact her OB team with any specific questions regarding her pregnancy, and to contact [facility] or 911 in the case of a medical emergency.

## 2019-03-17 ENCOUNTER — PATIENT MESSAGE (OUTPATIENT)
Dept: OBSTETRICS AND GYNECOLOGY | Facility: CLINIC | Age: 34
End: 2019-03-17

## 2019-03-18 PROBLEM — D27.0 CYST, OVARY, DERMOID, RIGHT: Status: ACTIVE | Noted: 2019-03-18

## 2019-04-03 ENCOUNTER — ROUTINE PRENATAL (OUTPATIENT)
Dept: OBSTETRICS AND GYNECOLOGY | Facility: CLINIC | Age: 34
End: 2019-04-03
Payer: COMMERCIAL

## 2019-04-03 VITALS — DIASTOLIC BLOOD PRESSURE: 68 MMHG | BODY MASS INDEX: 36.67 KG/M2 | WEIGHT: 207 LBS | SYSTOLIC BLOOD PRESSURE: 120 MMHG

## 2019-04-03 DIAGNOSIS — Z3A.17 PREGNANCY WITH 17 COMPLETED WEEKS GESTATION: Primary | ICD-10-CM

## 2019-04-03 PROCEDURE — 0502F PR SUBSEQUENT PRENATAL CARE: ICD-10-PCS | Mod: CPTII,S$GLB,, | Performed by: OBSTETRICS & GYNECOLOGY

## 2019-04-03 PROCEDURE — 99999 PR PBB SHADOW E&M-EST. PATIENT-LVL II: ICD-10-PCS | Mod: PBBFAC,,, | Performed by: OBSTETRICS & GYNECOLOGY

## 2019-04-03 PROCEDURE — 99999 PR PBB SHADOW E&M-EST. PATIENT-LVL II: CPT | Mod: PBBFAC,,, | Performed by: OBSTETRICS & GYNECOLOGY

## 2019-04-03 PROCEDURE — 0502F SUBSEQUENT PRENATAL CARE: CPT | Mod: CPTII,S$GLB,, | Performed by: OBSTETRICS & GYNECOLOGY

## 2019-04-03 NOTE — PROGRESS NOTES
DOING WELL, STARTING TO FEEL SOME FM, NO BLEEDING OR CRAMPING.  HAD NL MATERNATI XY AND ANATOMY USG 4/24.  NO C/O AND F/U 4 WEEKS

## 2019-04-23 ENCOUNTER — PATIENT MESSAGE (OUTPATIENT)
Dept: ADMINISTRATIVE | Facility: OTHER | Age: 34
End: 2019-04-23

## 2019-04-24 ENCOUNTER — PROCEDURE VISIT (OUTPATIENT)
Dept: MATERNAL FETAL MEDICINE | Facility: CLINIC | Age: 34
End: 2019-04-24
Payer: COMMERCIAL

## 2019-04-24 DIAGNOSIS — D27.0 CYST, OVARY, DERMOID, RIGHT: ICD-10-CM

## 2019-04-24 DIAGNOSIS — Z3A.20 20 WEEKS GESTATION OF PREGNANCY: ICD-10-CM

## 2019-04-24 DIAGNOSIS — Z36.3 ANTENATAL SCREENING FOR MALFORMATION USING ULTRASONICS: ICD-10-CM

## 2019-04-24 DIAGNOSIS — Z36.89 ENCOUNTER FOR ULTRASOUND TO ASSESS FETAL GROWTH: Primary | ICD-10-CM

## 2019-04-24 DIAGNOSIS — Z3A.14 PREGNANCY WITH 14 COMPLETED WEEKS GESTATION: ICD-10-CM

## 2019-04-24 PROCEDURE — 99499 UNLISTED E&M SERVICE: CPT | Mod: S$GLB,,, | Performed by: OBSTETRICS & GYNECOLOGY

## 2019-04-24 PROCEDURE — 76805 PR US, OB 14+WKS, TRANSABD, SINGLE GESTATION: ICD-10-PCS | Mod: S$GLB,,, | Performed by: OBSTETRICS & GYNECOLOGY

## 2019-04-24 PROCEDURE — 76805 OB US >/= 14 WKS SNGL FETUS: CPT | Mod: S$GLB,,, | Performed by: OBSTETRICS & GYNECOLOGY

## 2019-04-24 PROCEDURE — 99499 NO LOS: ICD-10-PCS | Mod: S$GLB,,, | Performed by: OBSTETRICS & GYNECOLOGY

## 2019-05-01 ENCOUNTER — ROUTINE PRENATAL (OUTPATIENT)
Dept: OBSTETRICS AND GYNECOLOGY | Facility: CLINIC | Age: 34
End: 2019-05-01
Payer: COMMERCIAL

## 2019-05-01 VITALS — DIASTOLIC BLOOD PRESSURE: 70 MMHG | SYSTOLIC BLOOD PRESSURE: 136 MMHG | WEIGHT: 213.38 LBS | BODY MASS INDEX: 37.8 KG/M2

## 2019-05-01 DIAGNOSIS — Z3A.21 PREGNANCY WITH 21 COMPLETED WEEKS GESTATION: Primary | ICD-10-CM

## 2019-05-01 PROCEDURE — 99999 PR PBB SHADOW E&M-EST. PATIENT-LVL II: ICD-10-PCS | Mod: PBBFAC,,, | Performed by: OBSTETRICS & GYNECOLOGY

## 2019-05-01 PROCEDURE — 0502F SUBSEQUENT PRENATAL CARE: CPT | Mod: CPTII,S$GLB,, | Performed by: OBSTETRICS & GYNECOLOGY

## 2019-05-01 PROCEDURE — 99999 PR PBB SHADOW E&M-EST. PATIENT-LVL II: CPT | Mod: PBBFAC,,, | Performed by: OBSTETRICS & GYNECOLOGY

## 2019-05-01 PROCEDURE — 0502F PR SUBSEQUENT PRENATAL CARE: ICD-10-PCS | Mod: CPTII,S$GLB,, | Performed by: OBSTETRICS & GYNECOLOGY

## 2019-05-01 NOTE — PROGRESS NOTES
GOOD FM, NO UC AND NO PV LOSS.  No c/o and has f/u USG TO COMPLETE ANATOMY 5/29.  ITCHING AXILLA WITH NEW DEODERANT - IF FOLLICULITIS RECURS WHEN RETURNS TO OLD ONE, MAY NEED TO RX BACTROBAN.  F/U 4 WEEKS

## 2019-05-29 ENCOUNTER — ROUTINE PRENATAL (OUTPATIENT)
Dept: OBSTETRICS AND GYNECOLOGY | Facility: CLINIC | Age: 34
End: 2019-05-29
Payer: COMMERCIAL

## 2019-05-29 VITALS
SYSTOLIC BLOOD PRESSURE: 128 MMHG | BODY MASS INDEX: 38.27 KG/M2 | WEIGHT: 216.06 LBS | DIASTOLIC BLOOD PRESSURE: 68 MMHG

## 2019-05-29 DIAGNOSIS — Z3A.25 PREGNANCY WITH 25 COMPLETED WEEKS GESTATION: Primary | ICD-10-CM

## 2019-05-29 PROCEDURE — 99999 PR PBB SHADOW E&M-EST. PATIENT-LVL II: CPT | Mod: PBBFAC,,, | Performed by: OBSTETRICS & GYNECOLOGY

## 2019-05-29 PROCEDURE — 0502F SUBSEQUENT PRENATAL CARE: CPT | Mod: CPTII,S$GLB,, | Performed by: OBSTETRICS & GYNECOLOGY

## 2019-05-29 PROCEDURE — 99999 PR PBB SHADOW E&M-EST. PATIENT-LVL II: ICD-10-PCS | Mod: PBBFAC,,, | Performed by: OBSTETRICS & GYNECOLOGY

## 2019-05-29 PROCEDURE — 0502F PR SUBSEQUENT PRENATAL CARE: ICD-10-PCS | Mod: CPTII,S$GLB,, | Performed by: OBSTETRICS & GYNECOLOGY

## 2019-05-29 NOTE — PROGRESS NOTES
GOOD FM, NO UC AND NO PV LOSS.  HAS APPT 6/3 (ANATOMY) USG F/U.  NEXT VISIT IN ABOUT 4 WEEKS WITH LABS AND TDAP.

## 2019-06-03 ENCOUNTER — PROCEDURE VISIT (OUTPATIENT)
Dept: MATERNAL FETAL MEDICINE | Facility: CLINIC | Age: 34
End: 2019-06-03
Payer: COMMERCIAL

## 2019-06-03 ENCOUNTER — INITIAL CONSULT (OUTPATIENT)
Dept: MATERNAL FETAL MEDICINE | Facility: CLINIC | Age: 34
End: 2019-06-03
Attending: OBSTETRICS & GYNECOLOGY
Payer: COMMERCIAL

## 2019-06-03 VITALS
DIASTOLIC BLOOD PRESSURE: 78 MMHG | WEIGHT: 217.13 LBS | BODY MASS INDEX: 38.47 KG/M2 | SYSTOLIC BLOOD PRESSURE: 112 MMHG

## 2019-06-03 DIAGNOSIS — Z36.89 ENCOUNTER FOR ULTRASOUND TO ASSESS FETAL GROWTH: ICD-10-CM

## 2019-06-03 DIAGNOSIS — Z36.9 ANTENATAL SCREENING ENCOUNTER: Primary | ICD-10-CM

## 2019-06-03 DIAGNOSIS — O99.891 CURRENT MATERNAL CONDITION AFFECTING PREGNANCY: ICD-10-CM

## 2019-06-03 DIAGNOSIS — O34.82 DERMOID CYST OF OVARY AFFECTING PREGNANCY IN SECOND TRIMESTER, ANTEPARTUM: ICD-10-CM

## 2019-06-03 DIAGNOSIS — Z01.89 ENCOUNTER FOR LABORATORY TEST: ICD-10-CM

## 2019-06-03 DIAGNOSIS — Z36.3 ANTENATAL SCREENING FOR MALFORMATION USING ULTRASONICS: ICD-10-CM

## 2019-06-03 DIAGNOSIS — Z3A.26 26 WEEKS GESTATION OF PREGNANCY: ICD-10-CM

## 2019-06-03 DIAGNOSIS — Z36.4 ANTENATAL SCREENING FOR FETAL GROWTH RETARDATION USING ULTRASONICS: ICD-10-CM

## 2019-06-03 DIAGNOSIS — D27.9 DERMOID CYST OF OVARY AFFECTING PREGNANCY IN SECOND TRIMESTER, ANTEPARTUM: ICD-10-CM

## 2019-06-03 PROCEDURE — 99999 PR PBB SHADOW E&M-EST. PATIENT-LVL II: CPT | Mod: PBBFAC,,, | Performed by: OBSTETRICS & GYNECOLOGY

## 2019-06-03 PROCEDURE — 76816 PR  US,PREGNANT UTERUS,F/U,TRANSABD APP: ICD-10-PCS | Mod: S$GLB,,, | Performed by: OBSTETRICS & GYNECOLOGY

## 2019-06-03 PROCEDURE — 99499 UNLISTED E&M SERVICE: CPT | Mod: S$GLB,,, | Performed by: OBSTETRICS & GYNECOLOGY

## 2019-06-03 PROCEDURE — 76816 OB US FOLLOW-UP PER FETUS: CPT | Mod: S$GLB,,, | Performed by: OBSTETRICS & GYNECOLOGY

## 2019-06-03 PROCEDURE — 99499 NO LOS: ICD-10-PCS | Mod: S$GLB,,, | Performed by: OBSTETRICS & GYNECOLOGY

## 2019-06-03 PROCEDURE — 99999 PR PBB SHADOW E&M-EST. PATIENT-LVL II: ICD-10-PCS | Mod: PBBFAC,,, | Performed by: OBSTETRICS & GYNECOLOGY

## 2019-06-10 ENCOUNTER — LAB VISIT (OUTPATIENT)
Dept: LAB | Facility: HOSPITAL | Age: 34
End: 2019-06-10
Attending: OBSTETRICS & GYNECOLOGY
Payer: COMMERCIAL

## 2019-06-10 DIAGNOSIS — Z3A.25 PREGNANCY WITH 25 COMPLETED WEEKS GESTATION: ICD-10-CM

## 2019-06-10 LAB
BASOPHILS # BLD AUTO: 0.01 K/UL (ref 0–0.2)
BASOPHILS NFR BLD: 0.2 % (ref 0–1.9)
DIFFERENTIAL METHOD: ABNORMAL
EOSINOPHIL # BLD AUTO: 0.1 K/UL (ref 0–0.5)
EOSINOPHIL NFR BLD: 0.9 % (ref 0–8)
ERYTHROCYTE [DISTWIDTH] IN BLOOD BY AUTOMATED COUNT: 14.6 % (ref 11.5–14.5)
GLUCOSE SERPL-MCNC: 143 MG/DL (ref 70–140)
HCT VFR BLD AUTO: 34.4 % (ref 37–48.5)
HGB BLD-MCNC: 11.3 G/DL (ref 12–16)
LYMPHOCYTES # BLD AUTO: 1.4 K/UL (ref 1–4.8)
LYMPHOCYTES NFR BLD: 21 % (ref 18–48)
MCH RBC QN AUTO: 26.9 PG (ref 27–31)
MCHC RBC AUTO-ENTMCNC: 32.8 G/DL (ref 32–36)
MCV RBC AUTO: 82 FL (ref 82–98)
MONOCYTES # BLD AUTO: 0.4 K/UL (ref 0.3–1)
MONOCYTES NFR BLD: 5.5 % (ref 4–15)
NEUTROPHILS # BLD AUTO: 4.7 K/UL (ref 1.8–7.7)
NEUTROPHILS NFR BLD: 72.4 % (ref 38–73)
PLATELET # BLD AUTO: 246 K/UL (ref 150–350)
PMV BLD AUTO: 11.8 FL (ref 9.2–12.9)
RBC # BLD AUTO: 4.2 M/UL (ref 4–5.4)
WBC # BLD AUTO: 6.53 K/UL (ref 3.9–12.7)

## 2019-06-10 PROCEDURE — 82950 GLUCOSE TEST: CPT

## 2019-06-10 PROCEDURE — 85025 COMPLETE CBC W/AUTO DIFF WBC: CPT

## 2019-06-10 PROCEDURE — 36415 COLL VENOUS BLD VENIPUNCTURE: CPT

## 2019-06-12 ENCOUNTER — ROUTINE PRENATAL (OUTPATIENT)
Dept: OBSTETRICS AND GYNECOLOGY | Facility: CLINIC | Age: 34
End: 2019-06-12
Payer: COMMERCIAL

## 2019-06-12 VITALS
DIASTOLIC BLOOD PRESSURE: 76 MMHG | WEIGHT: 218.25 LBS | SYSTOLIC BLOOD PRESSURE: 118 MMHG | BODY MASS INDEX: 38.66 KG/M2

## 2019-06-12 DIAGNOSIS — O99.810 ABNORMAL GLUCOSE AFFECTING PREGNANCY: ICD-10-CM

## 2019-06-12 DIAGNOSIS — Z3A.27 PREGNANCY WITH 27 COMPLETED WEEKS GESTATION: Primary | ICD-10-CM

## 2019-06-12 PROCEDURE — 99999 PR PBB SHADOW E&M-EST. PATIENT-LVL II: ICD-10-PCS | Mod: PBBFAC,,, | Performed by: OBSTETRICS & GYNECOLOGY

## 2019-06-12 PROCEDURE — 99999 PR PBB SHADOW E&M-EST. PATIENT-LVL II: CPT | Mod: PBBFAC,,, | Performed by: OBSTETRICS & GYNECOLOGY

## 2019-06-12 PROCEDURE — 0502F PR SUBSEQUENT PRENATAL CARE: ICD-10-PCS | Mod: CPTII,S$GLB,, | Performed by: OBSTETRICS & GYNECOLOGY

## 2019-06-12 PROCEDURE — 0502F SUBSEQUENT PRENATAL CARE: CPT | Mod: CPTII,S$GLB,, | Performed by: OBSTETRICS & GYNECOLOGY

## 2019-06-12 NOTE — PROGRESS NOTES
GOOD FM, NO UC AND NO PV LOSS.  F/U MARY USG 6/9 NOT YET POSTED, BUT REPORTS BORDERLINE INCR AFV AND BORDERLINE INCR LATERAL VENTRICLE - WILL RECHECK AGAIN TOMORROW. FEELING WELL.  , GTT ORDERED, F/U 2 WEEKS

## 2019-06-17 ENCOUNTER — LAB VISIT (OUTPATIENT)
Dept: LAB | Facility: OTHER | Age: 34
End: 2019-06-17
Attending: OBSTETRICS & GYNECOLOGY
Payer: COMMERCIAL

## 2019-06-17 DIAGNOSIS — O99.810 ABNORMAL GLUCOSE AFFECTING PREGNANCY: ICD-10-CM

## 2019-06-17 LAB
GLUCOSE SERPL-MCNC: 104 MG/DL
GLUCOSE SERPL-MCNC: 127 MG/DL
GLUCOSE SERPL-MCNC: 86 MG/DL
GLUCOSE SERPL-MCNC: 88 MG/DL (ref 70–110)

## 2019-06-17 PROCEDURE — 36415 COLL VENOUS BLD VENIPUNCTURE: CPT

## 2019-06-17 PROCEDURE — 82952 GTT-ADDED SAMPLES: CPT

## 2019-06-17 PROCEDURE — 82951 GLUCOSE TOLERANCE TEST (GTT): CPT

## 2019-06-26 ENCOUNTER — ROUTINE PRENATAL (OUTPATIENT)
Dept: OBSTETRICS AND GYNECOLOGY | Facility: CLINIC | Age: 34
End: 2019-06-26
Payer: COMMERCIAL

## 2019-06-26 VITALS
WEIGHT: 216.06 LBS | DIASTOLIC BLOOD PRESSURE: 76 MMHG | SYSTOLIC BLOOD PRESSURE: 116 MMHG | BODY MASS INDEX: 38.27 KG/M2

## 2019-06-26 DIAGNOSIS — Z3A.29 PREGNANCY WITH 29 COMPLETED WEEKS GESTATION: Primary | ICD-10-CM

## 2019-06-26 PROCEDURE — 99999 PR PBB SHADOW E&M-EST. PATIENT-LVL II: ICD-10-PCS | Mod: PBBFAC,,, | Performed by: OBSTETRICS & GYNECOLOGY

## 2019-06-26 PROCEDURE — 99999 PR PBB SHADOW E&M-EST. PATIENT-LVL II: CPT | Mod: PBBFAC,,, | Performed by: OBSTETRICS & GYNECOLOGY

## 2019-06-26 PROCEDURE — 0502F PR SUBSEQUENT PRENATAL CARE: ICD-10-PCS | Mod: CPTII,S$GLB,, | Performed by: OBSTETRICS & GYNECOLOGY

## 2019-06-26 PROCEDURE — 0502F SUBSEQUENT PRENATAL CARE: CPT | Mod: CPTII,S$GLB,, | Performed by: OBSTETRICS & GYNECOLOGY

## 2019-06-28 NOTE — PROGRESS NOTES
GOOD FM, NO UC AND NO PV LOSS.  LAST USG NORMAL LATERAL VENTRICLES AND NL GTT LAST TESTING.  DOING WELL, F/U 2 WEEKS

## 2019-07-03 ENCOUNTER — PATIENT MESSAGE (OUTPATIENT)
Dept: ADMINISTRATIVE | Facility: OTHER | Age: 34
End: 2019-07-03

## 2019-07-03 ENCOUNTER — PATIENT MESSAGE (OUTPATIENT)
Dept: OBSTETRICS AND GYNECOLOGY | Facility: CLINIC | Age: 34
End: 2019-07-03

## 2019-07-10 ENCOUNTER — ROUTINE PRENATAL (OUTPATIENT)
Dept: OBSTETRICS AND GYNECOLOGY | Facility: CLINIC | Age: 34
End: 2019-07-10
Payer: COMMERCIAL

## 2019-07-10 VITALS
SYSTOLIC BLOOD PRESSURE: 122 MMHG | WEIGHT: 219.81 LBS | BODY MASS INDEX: 38.94 KG/M2 | DIASTOLIC BLOOD PRESSURE: 82 MMHG

## 2019-07-10 DIAGNOSIS — Z3A.31 PREGNANCY WITH 31 COMPLETED WEEKS GESTATION: Primary | ICD-10-CM

## 2019-07-10 PROCEDURE — 99999 PR PBB SHADOW E&M-EST. PATIENT-LVL II: ICD-10-PCS | Mod: PBBFAC,,, | Performed by: OBSTETRICS & GYNECOLOGY

## 2019-07-10 PROCEDURE — 0502F PR SUBSEQUENT PRENATAL CARE: ICD-10-PCS | Mod: CPTII,S$GLB,, | Performed by: OBSTETRICS & GYNECOLOGY

## 2019-07-10 PROCEDURE — 99999 PR PBB SHADOW E&M-EST. PATIENT-LVL II: CPT | Mod: PBBFAC,,, | Performed by: OBSTETRICS & GYNECOLOGY

## 2019-07-10 PROCEDURE — 0502F SUBSEQUENT PRENATAL CARE: CPT | Mod: CPTII,S$GLB,, | Performed by: OBSTETRICS & GYNECOLOGY

## 2019-07-10 NOTE — PROGRESS NOTES
GOOD FM, NO UC AND NO PV LOSS.  HAS APPT SCHEDULED 7/24 F/U USG GROWTH AT TIME OF NEXT VISIT.  LAST USG WAS CEPHALIC AND MORE PELVIC PRESSURE.  TDAP NEXT, F/U 2 WEEKS

## 2019-07-24 ENCOUNTER — ROUTINE PRENATAL (OUTPATIENT)
Dept: OBSTETRICS AND GYNECOLOGY | Facility: CLINIC | Age: 34
End: 2019-07-24
Payer: COMMERCIAL

## 2019-07-24 VITALS — SYSTOLIC BLOOD PRESSURE: 120 MMHG | DIASTOLIC BLOOD PRESSURE: 80 MMHG | BODY MASS INDEX: 38.97 KG/M2 | WEIGHT: 220 LBS

## 2019-07-24 DIAGNOSIS — Z3A.33 PREGNANCY WITH 33 COMPLETED WEEKS GESTATION: Primary | ICD-10-CM

## 2019-07-24 PROCEDURE — 99999 PR PBB SHADOW E&M-EST. PATIENT-LVL II: ICD-10-PCS | Mod: PBBFAC,,, | Performed by: OBSTETRICS & GYNECOLOGY

## 2019-07-24 PROCEDURE — 0502F PR SUBSEQUENT PRENATAL CARE: ICD-10-PCS | Mod: CPTII,S$GLB,, | Performed by: OBSTETRICS & GYNECOLOGY

## 2019-07-24 PROCEDURE — 0502F SUBSEQUENT PRENATAL CARE: CPT | Mod: CPTII,S$GLB,, | Performed by: OBSTETRICS & GYNECOLOGY

## 2019-07-24 PROCEDURE — 99999 PR PBB SHADOW E&M-EST. PATIENT-LVL II: CPT | Mod: PBBFAC,,, | Performed by: OBSTETRICS & GYNECOLOGY

## 2019-07-24 NOTE — PROGRESS NOTES
GOOD FM, NO UC AND NO PV LOSS.  BUSY TODAY IN [facility] USG SO RESCHEDULED F/U SCAN TO Friday 7/26.  WILL ADMIN TDAP AT THAT TIME.  F/U 2 WEEKS PELVIC AND GBS

## 2019-07-26 ENCOUNTER — INITIAL CONSULT (OUTPATIENT)
Dept: MATERNAL FETAL MEDICINE | Facility: CLINIC | Age: 34
End: 2019-07-26
Attending: OBSTETRICS & GYNECOLOGY
Payer: COMMERCIAL

## 2019-07-26 ENCOUNTER — CLINICAL SUPPORT (OUTPATIENT)
Dept: OBSTETRICS AND GYNECOLOGY | Facility: CLINIC | Age: 34
End: 2019-07-26
Payer: COMMERCIAL

## 2019-07-26 ENCOUNTER — PROCEDURE VISIT (OUTPATIENT)
Dept: MATERNAL FETAL MEDICINE | Facility: CLINIC | Age: 34
End: 2019-07-26
Payer: COMMERCIAL

## 2019-07-26 DIAGNOSIS — Z3A.33 33 WEEKS GESTATION OF PREGNANCY: ICD-10-CM

## 2019-07-26 DIAGNOSIS — Z36.3 ANTENATAL SCREENING FOR MALFORMATION USING ULTRASONICS: ICD-10-CM

## 2019-07-26 DIAGNOSIS — E66.9 OBESITY (BMI 35.0-39.9 WITHOUT COMORBIDITY): ICD-10-CM

## 2019-07-26 DIAGNOSIS — D27.0 CYST, OVARY, DERMOID, RIGHT: ICD-10-CM

## 2019-07-26 DIAGNOSIS — Z36.4 ANTENATAL SCREENING FOR FETAL GROWTH RETARDATION USING ULTRASONICS: ICD-10-CM

## 2019-07-26 DIAGNOSIS — O99.891 CURRENT MATERNAL CONDITION AFFECTING PREGNANCY: ICD-10-CM

## 2019-07-26 DIAGNOSIS — Z36.89 ENCOUNTER FOR ULTRASOUND TO ASSESS FETAL GROWTH: ICD-10-CM

## 2019-07-26 PROCEDURE — 90471 TDAP VACCINE GREATER THAN OR EQUAL TO 7YO IM: ICD-10-PCS | Mod: S$GLB,,, | Performed by: OBSTETRICS & GYNECOLOGY

## 2019-07-26 PROCEDURE — 76819 PR US, OB, FETAL BIOPHYSICAL, W/O NST: ICD-10-PCS | Mod: S$GLB,,, | Performed by: OBSTETRICS & GYNECOLOGY

## 2019-07-26 PROCEDURE — 99999 PR PBB SHADOW E&M-EST. PATIENT-LVL I: CPT | Mod: PBBFAC,,,

## 2019-07-26 PROCEDURE — 76816 OB US FOLLOW-UP PER FETUS: CPT | Mod: S$GLB,,, | Performed by: OBSTETRICS & GYNECOLOGY

## 2019-07-26 PROCEDURE — 99499 NO LOS: ICD-10-PCS | Mod: S$GLB,,, | Performed by: OBSTETRICS & GYNECOLOGY

## 2019-07-26 PROCEDURE — 76816 PR  US,PREGNANT UTERUS,F/U,TRANSABD APP: ICD-10-PCS | Mod: S$GLB,,, | Performed by: OBSTETRICS & GYNECOLOGY

## 2019-07-26 PROCEDURE — 76819 FETAL BIOPHYS PROFIL W/O NST: CPT | Mod: S$GLB,,, | Performed by: OBSTETRICS & GYNECOLOGY

## 2019-07-26 PROCEDURE — 90715 TDAP VACCINE 7 YRS/> IM: CPT | Mod: S$GLB,,, | Performed by: OBSTETRICS & GYNECOLOGY

## 2019-07-26 PROCEDURE — 90715 TDAP VACCINE GREATER THAN OR EQUAL TO 7YO IM: ICD-10-PCS | Mod: S$GLB,,, | Performed by: OBSTETRICS & GYNECOLOGY

## 2019-07-26 PROCEDURE — 99999 PR PBB SHADOW E&M-EST. PATIENT-LVL I: ICD-10-PCS | Mod: PBBFAC,,,

## 2019-07-26 PROCEDURE — 90471 IMMUNIZATION ADMIN: CPT | Mod: S$GLB,,, | Performed by: OBSTETRICS & GYNECOLOGY

## 2019-07-26 PROCEDURE — 99499 UNLISTED E&M SERVICE: CPT | Mod: S$GLB,,, | Performed by: OBSTETRICS & GYNECOLOGY

## 2019-08-05 ENCOUNTER — PATIENT MESSAGE (OUTPATIENT)
Dept: OBSTETRICS AND GYNECOLOGY | Facility: HOSPITAL | Age: 34
End: 2019-08-05

## 2019-08-05 DIAGNOSIS — O16.3 HYPERTENSION AFFECTING PREGNANCY IN THIRD TRIMESTER: Primary | ICD-10-CM

## 2019-08-05 NOTE — PROGRESS NOTES
Your systolic blood pressures are a little higher at home than we've seen in the pregnancy - lets see if Yelena in prenatal testing will  similar numbers.  I'll enter an order for a NST either Monday or Tuesday so that we can check things out.  If blood pressures are high there as well, we'll check some urine and labs.  And if you have a new, severe headache, blurry vision, or pain that persists under your right ribs, please come in so that we can evaluate you urgently for preeclampsia.

## 2019-08-07 ENCOUNTER — ROUTINE PRENATAL (OUTPATIENT)
Dept: OBSTETRICS AND GYNECOLOGY | Facility: CLINIC | Age: 34
End: 2019-08-07
Payer: COMMERCIAL

## 2019-08-07 VITALS
DIASTOLIC BLOOD PRESSURE: 89 MMHG | SYSTOLIC BLOOD PRESSURE: 128 MMHG | WEIGHT: 225.06 LBS | BODY MASS INDEX: 39.87 KG/M2

## 2019-08-07 DIAGNOSIS — Z3A.35 PREGNANCY WITH 35 COMPLETED WEEKS GESTATION: Primary | ICD-10-CM

## 2019-08-07 PROCEDURE — 87081 CULTURE SCREEN ONLY: CPT

## 2019-08-07 PROCEDURE — 99999 PR PBB SHADOW E&M-EST. PATIENT-LVL II: ICD-10-PCS | Mod: PBBFAC,,, | Performed by: OBSTETRICS & GYNECOLOGY

## 2019-08-07 PROCEDURE — 99999 PR PBB SHADOW E&M-EST. PATIENT-LVL II: CPT | Mod: PBBFAC,,, | Performed by: OBSTETRICS & GYNECOLOGY

## 2019-08-07 PROCEDURE — 0502F SUBSEQUENT PRENATAL CARE: CPT | Mod: CPTII,S$GLB,, | Performed by: OBSTETRICS & GYNECOLOGY

## 2019-08-07 PROCEDURE — 0502F PR SUBSEQUENT PRENATAL CARE: ICD-10-PCS | Mod: CPTII,S$GLB,, | Performed by: OBSTETRICS & GYNECOLOGY

## 2019-08-07 PROCEDURE — 87147 CULTURE TYPE IMMUNOLOGIC: CPT

## 2019-08-07 PROCEDURE — 87184 SC STD DISK METHOD PER PLATE: CPT

## 2019-08-07 NOTE — PROGRESS NOTES
GOOD FM, NO UC AND NO PV LOSS.  NO H/A,BLURRY VISION; ADVISED AND WILL CHECK HER CUFF VERSUS PRENATAL TESTING TO CALIBRATE.  1/ 60%/ -3, MED, MID.  VISIT 1 WEEK

## 2019-08-09 ENCOUNTER — LAB VISIT (OUTPATIENT)
Dept: LAB | Facility: OTHER | Age: 34
End: 2019-08-09
Attending: OBSTETRICS & GYNECOLOGY
Payer: COMMERCIAL

## 2019-08-09 DIAGNOSIS — Z3A.35 PREGNANCY WITH 35 COMPLETED WEEKS GESTATION: ICD-10-CM

## 2019-08-09 LAB
BASOPHILS # BLD AUTO: 0.01 K/UL (ref 0–0.2)
BASOPHILS NFR BLD: 0.2 % (ref 0–1.9)
DIFFERENTIAL METHOD: ABNORMAL
EOSINOPHIL # BLD AUTO: 0 K/UL (ref 0–0.5)
EOSINOPHIL NFR BLD: 0.6 % (ref 0–8)
ERYTHROCYTE [DISTWIDTH] IN BLOOD BY AUTOMATED COUNT: 15.6 % (ref 11.5–14.5)
HCT VFR BLD AUTO: 36 % (ref 37–48.5)
HGB BLD-MCNC: 11.2 G/DL (ref 12–16)
IMM GRANULOCYTES # BLD AUTO: 0.03 K/UL (ref 0–0.04)
IMM GRANULOCYTES NFR BLD AUTO: 0.5 % (ref 0–0.5)
LYMPHOCYTES # BLD AUTO: 1.2 K/UL (ref 1–4.8)
LYMPHOCYTES NFR BLD: 19.8 % (ref 18–48)
MCH RBC QN AUTO: 25.9 PG (ref 27–31)
MCHC RBC AUTO-ENTMCNC: 31.1 G/DL (ref 32–36)
MCV RBC AUTO: 83 FL (ref 82–98)
MONOCYTES # BLD AUTO: 0.5 K/UL (ref 0.3–1)
MONOCYTES NFR BLD: 7.7 % (ref 4–15)
NEUTROPHILS # BLD AUTO: 4.4 K/UL (ref 1.8–7.7)
NEUTROPHILS NFR BLD: 71.2 % (ref 38–73)
NRBC BLD-RTO: 0 /100 WBC
PLATELET # BLD AUTO: 160 K/UL (ref 150–350)
PLATELET BLD QL SMEAR: ABNORMAL
PMV BLD AUTO: 13.4 FL (ref 9.2–12.9)
RBC # BLD AUTO: 4.32 M/UL (ref 4–5.4)
WBC # BLD AUTO: 6.22 K/UL (ref 3.9–12.7)

## 2019-08-09 PROCEDURE — 86592 SYPHILIS TEST NON-TREP QUAL: CPT

## 2019-08-09 PROCEDURE — 86703 HIV-1/HIV-2 1 RESULT ANTBDY: CPT

## 2019-08-09 PROCEDURE — 36415 COLL VENOUS BLD VENIPUNCTURE: CPT

## 2019-08-09 PROCEDURE — 85025 COMPLETE CBC W/AUTO DIFF WBC: CPT

## 2019-08-11 PROBLEM — O99.820 GBS (GROUP B STREPTOCOCCUS CARRIER), +RV CULTURE, CURRENTLY PREGNANT: Status: ACTIVE | Noted: 2019-08-11

## 2019-08-12 LAB
BACTERIA SPEC AEROBE CULT: ABNORMAL
HIV 1+2 AB+HIV1 P24 AG SERPL QL IA: NEGATIVE
RPR SER QL: NORMAL

## 2019-08-14 ENCOUNTER — ROUTINE PRENATAL (OUTPATIENT)
Dept: OBSTETRICS AND GYNECOLOGY | Facility: CLINIC | Age: 34
End: 2019-08-14
Payer: COMMERCIAL

## 2019-08-14 VITALS
DIASTOLIC BLOOD PRESSURE: 80 MMHG | BODY MASS INDEX: 40.22 KG/M2 | SYSTOLIC BLOOD PRESSURE: 122 MMHG | WEIGHT: 227.06 LBS

## 2019-08-14 DIAGNOSIS — Z3A.36 PREGNANCY WITH 36 COMPLETED WEEKS GESTATION: Primary | ICD-10-CM

## 2019-08-14 PROCEDURE — 99999 PR PBB SHADOW E&M-EST. PATIENT-LVL II: ICD-10-PCS | Mod: PBBFAC,,, | Performed by: OBSTETRICS & GYNECOLOGY

## 2019-08-14 PROCEDURE — 0502F PR SUBSEQUENT PRENATAL CARE: ICD-10-PCS | Mod: CPTII,S$GLB,, | Performed by: OBSTETRICS & GYNECOLOGY

## 2019-08-14 PROCEDURE — 99999 PR PBB SHADOW E&M-EST. PATIENT-LVL II: CPT | Mod: PBBFAC,,, | Performed by: OBSTETRICS & GYNECOLOGY

## 2019-08-14 PROCEDURE — 0502F SUBSEQUENT PRENATAL CARE: CPT | Mod: CPTII,S$GLB,, | Performed by: OBSTETRICS & GYNECOLOGY

## 2019-08-14 NOTE — PROGRESS NOTES
GOOD FM, IRREG UC AND NO PV LOSS.  1-2/ 70%/ -3, MED AND POST.  BP LOOKS GOOD TODAY AND WILL CHECK IN INTERMITTENTLY WITH ALEJANDRO IN PNT.  VISIT 1 WEEK

## 2019-08-18 ENCOUNTER — PATIENT MESSAGE (OUTPATIENT)
Dept: OBSTETRICS AND GYNECOLOGY | Facility: HOSPITAL | Age: 34
End: 2019-08-18

## 2019-08-18 DIAGNOSIS — O13.3 GESTATIONAL HYPERTENSION, THIRD TRIMESTER: Primary | ICD-10-CM

## 2019-08-18 NOTE — PROGRESS NOTES
MESSAGE TO PATIENT:  Lets check another BP tonight, and promise me you'll stop by prenatal testing tomorrow.  I'll ask Yelena to keep an ear out for you - probably best to do some labs and maybe a quick BPP.  At about 37 weeks, with a cervix that's not entirely unfavorable, delivered is safer than continuing, if preeclampsia is setting in.  I'll enter orders and will keep an eye out for your results tomorrow.  And if you've got a significant headache that doesn't respond to a Tylenol or two, or blurry vision, or a unresolving pain under your right ribs, you know that the labor room would love to see you tonight!

## 2019-08-19 ENCOUNTER — TELEPHONE (OUTPATIENT)
Dept: OBSTETRICS AND GYNECOLOGY | Facility: CLINIC | Age: 34
End: 2019-08-19

## 2019-08-19 ENCOUNTER — PATIENT MESSAGE (OUTPATIENT)
Dept: OBSTETRICS AND GYNECOLOGY | Facility: CLINIC | Age: 34
End: 2019-08-19

## 2019-08-19 ENCOUNTER — HOSPITAL ENCOUNTER (OUTPATIENT)
Dept: PERINATAL CARE | Facility: OTHER | Age: 34
Discharge: HOME | End: 2019-08-19
Attending: OBSTETRICS & GYNECOLOGY
Payer: COMMERCIAL

## 2019-08-19 DIAGNOSIS — O13.3 GESTATIONAL HYPERTENSION, THIRD TRIMESTER: ICD-10-CM

## 2019-08-19 PROCEDURE — 76819 PR US, OB, FETAL BIOPHYSICAL, W/O NST: ICD-10-PCS | Mod: 26,,, | Performed by: OBSTETRICS & GYNECOLOGY

## 2019-08-19 PROCEDURE — 76819 FETAL BIOPHYS PROFIL W/O NST: CPT | Mod: 26,,, | Performed by: OBSTETRICS & GYNECOLOGY

## 2019-08-19 PROCEDURE — 76819 FETAL BIOPHYS PROFIL W/O NST: CPT

## 2019-08-19 NOTE — TELEPHONE ENCOUNTER
----- Message from Lucia Osorio MD sent at 8/18/2019 11:55 PM CDT -----  FIRST PRIORITY TOMORROW, IN CASE SHE ULTIMATELY NEEDS INDUCTION.  LABS AND PNT AS EARLY IN THE DAY AS POSSIBLE . .

## 2019-08-20 ENCOUNTER — TELEPHONE (OUTPATIENT)
Dept: OBSTETRICS AND GYNECOLOGY | Facility: CLINIC | Age: 34
End: 2019-08-20

## 2019-08-20 NOTE — TELEPHONE ENCOUNTER
Patient called - PER [facility] NURSE  Blood pressures were 112/84   138/94 and 118/88 patient was advise to follow up with scheduled appts and prenatal testing tomorrow .. Patient understands all.

## 2019-08-21 ENCOUNTER — HOSPITAL ENCOUNTER (OUTPATIENT)
Dept: PERINATAL CARE | Facility: OTHER | Age: 34
Discharge: HOME | End: 2019-08-21
Attending: OBSTETRICS & GYNECOLOGY
Payer: COMMERCIAL

## 2019-08-21 ENCOUNTER — ROUTINE PRENATAL (OUTPATIENT)
Dept: OBSTETRICS AND GYNECOLOGY | Facility: CLINIC | Age: 34
End: 2019-08-21
Payer: COMMERCIAL

## 2019-08-21 VITALS
WEIGHT: 227.06 LBS | BODY MASS INDEX: 40.22 KG/M2 | DIASTOLIC BLOOD PRESSURE: 89 MMHG | SYSTOLIC BLOOD PRESSURE: 148 MMHG

## 2019-08-21 DIAGNOSIS — O13.3 GESTATIONAL HYPERTENSION, THIRD TRIMESTER: ICD-10-CM

## 2019-08-21 DIAGNOSIS — O16.3 HYPERTENSION AFFECTING PREGNANCY IN THIRD TRIMESTER: ICD-10-CM

## 2019-08-21 DIAGNOSIS — Z3A.37 PREGNANCY WITH 37 WEEKS COMPLETED GESTATION: Primary | ICD-10-CM

## 2019-08-21 PROCEDURE — 59025 FETAL NON-STRESS TEST: CPT

## 2019-08-21 PROCEDURE — 0502F PR SUBSEQUENT PRENATAL CARE: ICD-10-PCS | Mod: CPTII,S$GLB,, | Performed by: OBSTETRICS & GYNECOLOGY

## 2019-08-21 PROCEDURE — 0502F SUBSEQUENT PRENATAL CARE: CPT | Mod: CPTII,S$GLB,, | Performed by: OBSTETRICS & GYNECOLOGY

## 2019-08-21 PROCEDURE — 59025 PR FETAL 2N-STRESS TEST: ICD-10-PCS | Mod: 26,,, | Performed by: OBSTETRICS & GYNECOLOGY

## 2019-08-21 PROCEDURE — 59025 FETAL NON-STRESS TEST: CPT | Mod: 26,,, | Performed by: OBSTETRICS & GYNECOLOGY

## 2019-08-21 PROCEDURE — 99999 PR PBB SHADOW E&M-EST. PATIENT-LVL II: ICD-10-PCS | Mod: PBBFAC,,, | Performed by: OBSTETRICS & GYNECOLOGY

## 2019-08-21 PROCEDURE — 99999 PR PBB SHADOW E&M-EST. PATIENT-LVL II: CPT | Mod: PBBFAC,,, | Performed by: OBSTETRICS & GYNECOLOGY

## 2019-08-21 NOTE — Clinical Note
WOULD YOU MIND ONE MORE MANUAL BP FOR ANTHONY ON Friday?  TO BE SURE NOT SIGNIFICANTLY ELEVATED.  ONCE SHE GETS TO 38 WEEKS WITH DOCUMENTED GEST HTN, WE'RE GOING TO GET HER DELIVERED

## 2019-08-21 NOTE — PROGRESS NOTES
GOOD FM, NO UC AND NO PV LOSS.  DENIES H/A, BLURRY VISION.  NEG PROTEINURIA TODAY AND MILD RANGE PRESSURE NOTED - GEST HTN WITHOUT MEETING CRITERIA FOR PREECLAMPSIA.  DISCUSSED ISSUES SURROUNDING INDUCTION AND WILL SCHED AT THE BEGINNING OF NEXT WEEK.  COUNSELED RE WARNING SIGNS AND WILL CHECK A BP Friday TO BE SURE NO SIGNIF ELEVATIONS. 2-3/ 70%/ -2, MED, ANTERIOR.  INDX REQUEST 8/26 4:30 AM

## 2019-08-23 ENCOUNTER — ANESTHESIA EVENT (OUTPATIENT)
Dept: OBSTETRICS AND GYNECOLOGY | Facility: OTHER | Age: 34
End: 2019-08-23
Payer: COMMERCIAL

## 2019-08-23 ENCOUNTER — ANESTHESIA (OUTPATIENT)
Dept: OBSTETRICS AND GYNECOLOGY | Facility: OTHER | Age: 34
End: 2019-08-23
Payer: COMMERCIAL

## 2019-08-23 ENCOUNTER — HOSPITAL ENCOUNTER (INPATIENT)
Facility: OTHER | Age: 34
LOS: 2 days | Discharge: HOME | End: 2019-08-25
Attending: OBSTETRICS & GYNECOLOGY | Admitting: OBSTETRICS & GYNECOLOGY
Payer: COMMERCIAL

## 2019-08-23 LAB
ABO + RH BLD: NORMAL
ALBUMIN SERPL BCP-MCNC: 2.8 G/DL (ref 3.5–5.2)
ALP SERPL-CCNC: 202 U/L (ref 55–135)
ALT SERPL W/O P-5'-P-CCNC: 13 U/L (ref 10–44)
ANION GAP SERPL CALC-SCNC: 10 MMOL/L (ref 8–16)
AST SERPL-CCNC: 24 U/L (ref 10–40)
BASOPHILS # BLD AUTO: 0.02 K/UL (ref 0–0.2)
BASOPHILS NFR BLD: 0.3 % (ref 0–1.9)
BILIRUB SERPL-MCNC: 0.4 MG/DL (ref 0.1–1)
BLD GP AB SCN CELLS X3 SERPL QL: NORMAL
BUN SERPL-MCNC: 10 MG/DL (ref 6–20)
CALCIUM SERPL-MCNC: 9.1 MG/DL (ref 8.7–10.5)
CHLORIDE SERPL-SCNC: 110 MMOL/L (ref 95–110)
CO2 SERPL-SCNC: 18 MMOL/L (ref 23–29)
CREAT SERPL-MCNC: 0.8 MG/DL (ref 0.5–1.4)
DIFFERENTIAL METHOD: ABNORMAL
EOSINOPHIL # BLD AUTO: 0 K/UL (ref 0–0.5)
EOSINOPHIL NFR BLD: 0.3 % (ref 0–8)
ERYTHROCYTE [DISTWIDTH] IN BLOOD BY AUTOMATED COUNT: 16.4 % (ref 11.5–14.5)
EST. GFR  (AFRICAN AMERICAN): >60 ML/MIN/1.73 M^2
EST. GFR  (NON AFRICAN AMERICAN): >60 ML/MIN/1.73 M^2
GLUCOSE SERPL-MCNC: 90 MG/DL (ref 70–110)
HCT VFR BLD AUTO: 34.7 % (ref 37–48.5)
HGB BLD-MCNC: 11 G/DL (ref 12–16)
IMM GRANULOCYTES # BLD AUTO: 0.04 K/UL (ref 0–0.04)
IMM GRANULOCYTES NFR BLD AUTO: 0.6 % (ref 0–0.5)
LYMPHOCYTES # BLD AUTO: 1.5 K/UL (ref 1–4.8)
LYMPHOCYTES NFR BLD: 21 % (ref 18–48)
MCH RBC QN AUTO: 26.6 PG (ref 27–31)
MCHC RBC AUTO-ENTMCNC: 31.7 G/DL (ref 32–36)
MCV RBC AUTO: 84 FL (ref 82–98)
MONOCYTES # BLD AUTO: 0.6 K/UL (ref 0.3–1)
MONOCYTES NFR BLD: 7.9 % (ref 4–15)
NEUTROPHILS # BLD AUTO: 4.9 K/UL (ref 1.8–7.7)
NEUTROPHILS NFR BLD: 69.9 % (ref 38–73)
NRBC BLD-RTO: 0 /100 WBC
PLATELET # BLD AUTO: 160 K/UL (ref 150–350)
PLATELET BLD QL SMEAR: ABNORMAL
PMV BLD AUTO: 12.5 FL (ref 9.2–12.9)
POTASSIUM SERPL-SCNC: 4.2 MMOL/L (ref 3.5–5.1)
PROT SERPL-MCNC: 6.4 G/DL (ref 6–8.4)
RBC # BLD AUTO: 4.14 M/UL (ref 4–5.4)
SODIUM SERPL-SCNC: 138 MMOL/L (ref 136–145)
WBC # BLD AUTO: 6.94 K/UL (ref 3.9–12.7)

## 2019-08-23 PROCEDURE — 86901 BLOOD TYPING SEROLOGIC RH(D): CPT

## 2019-08-23 PROCEDURE — 11000001 HC ACUTE MED/SURG PRIVATE ROOM

## 2019-08-23 PROCEDURE — 85025 COMPLETE CBC W/AUTO DIFF WBC: CPT

## 2019-08-23 PROCEDURE — 72100002 HC LABOR CARE, 1ST 8 HOURS

## 2019-08-23 PROCEDURE — 36415 COLL VENOUS BLD VENIPUNCTURE: CPT

## 2019-08-23 PROCEDURE — 63600175 PHARM REV CODE 636 W HCPCS: Performed by: STUDENT IN AN ORGANIZED HEALTH CARE EDUCATION/TRAINING PROGRAM

## 2019-08-23 PROCEDURE — 80053 COMPREHEN METABOLIC PANEL: CPT

## 2019-08-23 RX ORDER — ONDANSETRON 8 MG/1
8 TABLET, ORALLY DISINTEGRATING ORAL EVERY 8 HOURS PRN
Status: DISCONTINUED | OUTPATIENT
Start: 2019-08-23 | End: 2019-08-24

## 2019-08-23 RX ORDER — CEFAZOLIN SODIUM 2 G/50ML
2 SOLUTION INTRAVENOUS
Status: COMPLETED | OUTPATIENT
Start: 2019-08-23 | End: 2019-08-24

## 2019-08-23 RX ORDER — OXYTOCIN/RINGER'S LACTATE 20/1000 ML
PLASTIC BAG, INJECTION (ML) INTRAVENOUS
Status: DISPENSED
Start: 2019-08-23 | End: 2019-08-24

## 2019-08-23 RX ORDER — OXYTOCIN/RINGER'S LACTATE 20/1000 ML
10 PLASTIC BAG, INJECTION (ML) INTRAVENOUS
Status: DISCONTINUED | OUTPATIENT
Start: 2019-08-23 | End: 2019-08-24

## 2019-08-23 RX ORDER — OXYTOCIN/RINGER'S LACTATE 20/1000 ML
2 PLASTIC BAG, INJECTION (ML) INTRAVENOUS CONTINUOUS
Status: DISCONTINUED | OUTPATIENT
Start: 2019-08-23 | End: 2019-08-24

## 2019-08-23 RX ORDER — SODIUM CHLORIDE 9 MG/ML
INJECTION, SOLUTION INTRAVENOUS
Status: DISCONTINUED | OUTPATIENT
Start: 2019-08-23 | End: 2019-08-24

## 2019-08-23 RX ORDER — OXYTOCIN/RINGER'S LACTATE 20/1000 ML
41.7 PLASTIC BAG, INJECTION (ML) INTRAVENOUS CONTINUOUS
Status: DISCONTINUED | OUTPATIENT
Start: 2019-08-23 | End: 2019-08-24

## 2019-08-23 RX ORDER — SODIUM CHLORIDE, SODIUM LACTATE, POTASSIUM CHLORIDE, CALCIUM CHLORIDE 600; 310; 30; 20 MG/100ML; MG/100ML; MG/100ML; MG/100ML
INJECTION, SOLUTION INTRAVENOUS CONTINUOUS
Status: DISCONTINUED | OUTPATIENT
Start: 2019-08-23 | End: 2019-08-24

## 2019-08-23 RX ORDER — OXYTOCIN/RINGER'S LACTATE 20/1000 ML
333 PLASTIC BAG, INJECTION (ML) INTRAVENOUS CONTINUOUS
Status: ACTIVE | OUTPATIENT
Start: 2019-08-23 | End: 2019-08-23

## 2019-08-23 RX ADMIN — SODIUM CHLORIDE, SODIUM LACTATE, POTASSIUM CHLORIDE, AND CALCIUM CHLORIDE: .6; .31; .03; .02 INJECTION, SOLUTION INTRAVENOUS at 08:08

## 2019-08-23 RX ADMIN — DEXTROSE 5 MILLION UNITS: 50 INJECTION, SOLUTION INTRAVENOUS at 09:08

## 2019-08-23 RX ADMIN — Medication 2 MILLI-UNITS/MIN: at 10:08

## 2019-08-24 LAB
BASOPHILS # BLD AUTO: 0.02 K/UL (ref 0–0.2)
BASOPHILS NFR BLD: 0.2 % (ref 0–1.9)
DIFFERENTIAL METHOD: ABNORMAL
EOSINOPHIL # BLD AUTO: 0 K/UL (ref 0–0.5)
EOSINOPHIL NFR BLD: 0.1 % (ref 0–8)
ERYTHROCYTE [DISTWIDTH] IN BLOOD BY AUTOMATED COUNT: 16.1 % (ref 11.5–14.5)
HCT VFR BLD AUTO: 32.7 % (ref 37–48.5)
HGB BLD-MCNC: 10.7 G/DL (ref 12–16)
IMM GRANULOCYTES # BLD AUTO: 0.04 K/UL (ref 0–0.04)
IMM GRANULOCYTES NFR BLD AUTO: 0.3 % (ref 0–0.5)
LYMPHOCYTES # BLD AUTO: 1.9 K/UL (ref 1–4.8)
LYMPHOCYTES NFR BLD: 16 % (ref 18–48)
MCH RBC QN AUTO: 26.8 PG (ref 27–31)
MCHC RBC AUTO-ENTMCNC: 32.7 G/DL (ref 32–36)
MCV RBC AUTO: 82 FL (ref 82–98)
MONOCYTES # BLD AUTO: 1 K/UL (ref 0.3–1)
MONOCYTES NFR BLD: 8.1 % (ref 4–15)
NEUTROPHILS # BLD AUTO: 9 K/UL (ref 1.8–7.7)
NEUTROPHILS NFR BLD: 75.3 % (ref 38–73)
NRBC BLD-RTO: 0 /100 WBC
PLATELET # BLD AUTO: 156 K/UL (ref 150–350)
PMV BLD AUTO: 13 FL (ref 9.2–12.9)
RBC # BLD AUTO: 3.99 M/UL (ref 4–5.4)
WBC # BLD AUTO: 11.93 K/UL (ref 3.9–12.7)

## 2019-08-24 PROCEDURE — 59409 PRA ETRICAL CARE,VAG DELIV ONLY: ICD-10-PCS | Mod: QY,,, | Performed by: ANESTHESIOLOGY

## 2019-08-24 PROCEDURE — 27200710 HC EPIDURAL INFUSION PUMP SET: Performed by: ANESTHESIOLOGY

## 2019-08-24 PROCEDURE — 72100003 HC LABOR CARE, EA. ADDL. 8 HRS

## 2019-08-24 PROCEDURE — 62326 NJX INTERLAMINAR LMBR/SAC: CPT | Performed by: STUDENT IN AN ORGANIZED HEALTH CARE EDUCATION/TRAINING PROGRAM

## 2019-08-24 PROCEDURE — 59400 OBSTETRICAL CARE: CPT | Mod: ,,, | Performed by: OBSTETRICS & GYNECOLOGY

## 2019-08-24 PROCEDURE — 72200005 HC VAGINAL DELIVERY LEVEL II

## 2019-08-24 PROCEDURE — 63600175 PHARM REV CODE 636 W HCPCS: Performed by: STUDENT IN AN ORGANIZED HEALTH CARE EDUCATION/TRAINING PROGRAM

## 2019-08-24 PROCEDURE — 51702 INSERT TEMP BLADDER CATH: CPT

## 2019-08-24 PROCEDURE — 25000003 PHARM REV CODE 250: Performed by: STUDENT IN AN ORGANIZED HEALTH CARE EDUCATION/TRAINING PROGRAM

## 2019-08-24 PROCEDURE — 59409 OBSTETRICAL CARE: CPT | Mod: QY,,, | Performed by: ANESTHESIOLOGY

## 2019-08-24 PROCEDURE — 59400 PR FULL ROUT OBSTE CARE,VAGINAL DELIV: ICD-10-PCS | Mod: ,,, | Performed by: OBSTETRICS & GYNECOLOGY

## 2019-08-24 PROCEDURE — 25000003 PHARM REV CODE 250: Performed by: ANESTHESIOLOGY

## 2019-08-24 PROCEDURE — 85025 COMPLETE CBC W/AUTO DIFF WBC: CPT

## 2019-08-24 PROCEDURE — 27800517 HC TRAY,EPIDURAL-CONTINUOUS: Performed by: ANESTHESIOLOGY

## 2019-08-24 PROCEDURE — 36415 COLL VENOUS BLD VENIPUNCTURE: CPT

## 2019-08-24 PROCEDURE — 72100002 HC LABOR CARE, 1ST 8 HOURS

## 2019-08-24 PROCEDURE — 11000001 HC ACUTE MED/SURG PRIVATE ROOM

## 2019-08-24 RX ORDER — CEFAZOLIN SODIUM 2 G/50ML
2 SOLUTION INTRAVENOUS ONCE
Status: DISCONTINUED | OUTPATIENT
Start: 2019-08-24 | End: 2019-08-24

## 2019-08-24 RX ORDER — FENTANYL/BUPIVACAINE/NS/PF 2MCG/ML-.1
PLASTIC BAG, INJECTION (ML) INJECTION CONTINUOUS
Status: CANCELLED | OUTPATIENT
Start: 2019-08-24

## 2019-08-24 RX ORDER — DIPHENOXYLATE HYDROCHLORIDE AND ATROPINE SULFATE 2.5; .025 MG/1; MG/1
1 TABLET ORAL 4 TIMES DAILY PRN
Status: DISCONTINUED | OUTPATIENT
Start: 2019-08-24 | End: 2019-08-25 | Stop reason: HOSPADM

## 2019-08-24 RX ORDER — ONDANSETRON 8 MG/1
8 TABLET, ORALLY DISINTEGRATING ORAL EVERY 8 HOURS PRN
Status: DISCONTINUED | OUTPATIENT
Start: 2019-08-24 | End: 2019-08-25 | Stop reason: HOSPADM

## 2019-08-24 RX ORDER — IBUPROFEN 600 MG/1
600 TABLET ORAL EVERY 6 HOURS PRN
Qty: 30 TABLET | Refills: 1 | Status: SHIPPED | OUTPATIENT
Start: 2019-08-24 | End: 2019-09-26

## 2019-08-24 RX ORDER — BUPIVACAINE HYDROCHLORIDE 2.5 MG/ML
INJECTION, SOLUTION EPIDURAL; INFILTRATION; INTRACAUDAL
Status: COMPLETED
Start: 2019-08-24 | End: 2019-08-24

## 2019-08-24 RX ORDER — DIPHENHYDRAMINE HCL 25 MG
25 CAPSULE ORAL EVERY 4 HOURS PRN
Status: DISCONTINUED | OUTPATIENT
Start: 2019-08-24 | End: 2019-08-25 | Stop reason: HOSPADM

## 2019-08-24 RX ORDER — FENTANYL/BUPIVACAINE/NS/PF 2MCG/ML-.1
PLASTIC BAG, INJECTION (ML) INJECTION
Status: DISPENSED
Start: 2019-08-24 | End: 2019-08-24

## 2019-08-24 RX ORDER — DIPHENHYDRAMINE HYDROCHLORIDE 50 MG/ML
25 INJECTION INTRAMUSCULAR; INTRAVENOUS EVERY 4 HOURS PRN
Status: DISCONTINUED | OUTPATIENT
Start: 2019-08-24 | End: 2019-08-25 | Stop reason: HOSPADM

## 2019-08-24 RX ORDER — BUPIVACAINE HYDROCHLORIDE 2.5 MG/ML
INJECTION, SOLUTION EPIDURAL; INFILTRATION; INTRACAUDAL
Status: DISCONTINUED | OUTPATIENT
Start: 2019-08-24 | End: 2019-08-24

## 2019-08-24 RX ORDER — IBUPROFEN 600 MG/1
600 TABLET ORAL EVERY 6 HOURS
Status: DISCONTINUED | OUTPATIENT
Start: 2019-08-24 | End: 2019-08-25 | Stop reason: HOSPADM

## 2019-08-24 RX ORDER — LIDOCAINE HYDROCHLORIDE AND EPINEPHRINE 15; 5 MG/ML; UG/ML
INJECTION, SOLUTION EPIDURAL
Status: DISCONTINUED | OUTPATIENT
Start: 2019-08-24 | End: 2019-08-24

## 2019-08-24 RX ORDER — FENTANYL/BUPIVACAINE/NS/PF 2MCG/ML-.1
PLASTIC BAG, INJECTION (ML) INJECTION CONTINUOUS PRN
Status: DISCONTINUED | OUTPATIENT
Start: 2019-08-24 | End: 2019-08-24

## 2019-08-24 RX ORDER — HYDROCODONE BITARTRATE AND ACETAMINOPHEN 5; 325 MG/1; MG/1
1 TABLET ORAL EVERY 4 HOURS PRN
Status: DISCONTINUED | OUTPATIENT
Start: 2019-08-24 | End: 2019-08-25 | Stop reason: HOSPADM

## 2019-08-24 RX ORDER — HYDROCORTISONE 25 MG/G
CREAM TOPICAL 3 TIMES DAILY PRN
Status: DISCONTINUED | OUTPATIENT
Start: 2019-08-24 | End: 2019-08-25 | Stop reason: HOSPADM

## 2019-08-24 RX ORDER — OXYTOCIN/RINGER'S LACTATE 20/1000 ML
41.65 PLASTIC BAG, INJECTION (ML) INTRAVENOUS CONTINUOUS
Status: ACTIVE | OUTPATIENT
Start: 2019-08-24 | End: 2019-08-24

## 2019-08-24 RX ORDER — SODIUM CITRATE AND CITRIC ACID MONOHYDRATE 334; 500 MG/5ML; MG/5ML
30 SOLUTION ORAL ONCE
Status: CANCELLED | OUTPATIENT
Start: 2019-08-24 | End: 2019-08-24

## 2019-08-24 RX ORDER — DOCUSATE SODIUM 100 MG/1
200 CAPSULE, LIQUID FILLED ORAL 2 TIMES DAILY PRN
Status: DISCONTINUED | OUTPATIENT
Start: 2019-08-24 | End: 2019-08-25 | Stop reason: HOSPADM

## 2019-08-24 RX ORDER — OXYTOCIN/RINGER'S LACTATE 20/1000 ML
20 PLASTIC BAG, INJECTION (ML) INTRAVENOUS ONCE
Status: DISCONTINUED | OUTPATIENT
Start: 2019-08-24 | End: 2019-08-25 | Stop reason: HOSPADM

## 2019-08-24 RX ORDER — HYDROCODONE BITARTRATE AND ACETAMINOPHEN 10; 325 MG/1; MG/1
1 TABLET ORAL EVERY 4 HOURS PRN
Status: DISCONTINUED | OUTPATIENT
Start: 2019-08-24 | End: 2019-08-25 | Stop reason: HOSPADM

## 2019-08-24 RX ORDER — ACETAMINOPHEN 325 MG/1
650 TABLET ORAL EVERY 6 HOURS PRN
Status: DISCONTINUED | OUTPATIENT
Start: 2019-08-24 | End: 2019-08-25 | Stop reason: HOSPADM

## 2019-08-24 RX ADMIN — Medication 10 ML: at 03:08

## 2019-08-24 RX ADMIN — IBUPROFEN 600 MG: 600 TABLET, FILM COATED ORAL at 10:08

## 2019-08-24 RX ADMIN — DEXTROSE 2.5 MILLION UNITS: 50 INJECTION, SOLUTION INTRAVENOUS at 01:08

## 2019-08-24 RX ADMIN — DEXTROSE 2.5 MILLION UNITS: 50 INJECTION, SOLUTION INTRAVENOUS at 04:08

## 2019-08-24 RX ADMIN — LIDOCAINE HYDROCHLORIDE,EPINEPHRINE BITARTRATE 3 ML: 15; .005 INJECTION, SOLUTION EPIDURAL; INFILTRATION; INTRACAUDAL; PERINEURAL at 02:08

## 2019-08-24 RX ADMIN — Medication 10 UNITS: at 08:08

## 2019-08-24 RX ADMIN — IBUPROFEN 600 MG: 600 TABLET, FILM COATED ORAL at 03:08

## 2019-08-24 RX ADMIN — CEFAZOLIN SODIUM 2 G: 2 SOLUTION INTRAVENOUS at 09:08

## 2019-08-24 RX ADMIN — Medication 10 ML/HR: at 03:08

## 2019-08-24 RX ADMIN — BUPIVACAINE HYDROCHLORIDE 6 ML: 2.5 INJECTION, SOLUTION EPIDURAL; INFILTRATION; INTRACAUDAL; PERINEURAL at 06:08

## 2019-08-24 NOTE — H&P
HISTORY AND PHYSICAL                                                OBSTETRICS          Subjective:       Meghna Aragon is a 34 y.o.  female with IUP at 37w5d weeks gestation who presents with for IOL for gestational HTN.    Patient denies contractions, denies vaginal bleeding, denies LOF.   Fetal Movement: normal.    This IUP is complicated by gHTN, prolactinoma (on no meds).    Review of Systems   Constitutional: Negative for fever.   Eyes: Negative for visual disturbance.   Respiratory: Negative for shortness of breath.    Cardiovascular: Negative for chest pain.   Gastrointestinal: Negative for abdominal pain, nausea and vomiting.   Genitourinary: Negative for menstrual problem and vaginal bleeding.   Integumentary:  Negative for rash.   Neurological: Negative for headaches.   Psychiatric/Behavioral: Negative for depression.       PMHx:   Past Medical History:   Diagnosis Date    Anemia     Hyperprolactinemia     MRI neg for pit mass    Hyperprolactinemia 2017    Multiple thyroid nodules     Ovarian cyst 2016    dermoid on MRI       PSHx:   Past Surgical History:   Procedure Laterality Date    MOUTH SURGERY         All:   Review of patient's allergies indicates:   Allergen Reactions    Codeine Itching       Meds:   Medications Prior to Admission   Medication Sig Dispense Refill Last Dose    PNV NO10/IRON FUM&P/FA/OMEGA-3 (PNV #10-IRON [id number] ORAL) Take by mouth.   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    Social Needs    Financial resource strain: Not on file    Food insecurity:     Worry: Not on file     Inability: Not on file    Transportation needs:     Medical: Not on file     Non-medical: Not on file   Tobacco Use     Smoking status: Never Smoker    Smokeless tobacco: Never Used   Substance and Sexual Activity    Alcohol use: No    Drug use: No    Sexual activity: Yes     Partners: Male     Birth control/protection: None   Lifestyle    Physical activity:     Days per week: Not on file     Minutes per session: Not on file    Stress: Not on file   Relationships    Social connections:     Talks on phone: Not on file     Gets together: Not on file     Attends [employer] service: Not on file     Active member of club or organization: Not on file     Attends meetings of clubs or organizations: Not on file     Relationship status: Not on file   Other Topics Concern    Not on file   Social History Narrative    Not on file       FH:   Family History   Problem Relation Age of Onset    Heart attacks under age 50 Father 44    Heart disease Father     Heart disease Paternal Grandmother     Hypertension Mother     Thyroid disease Mother     Heart disease Paternal Aunt     Heart disease Paternal Uncle     No Known Problems Sister     No Known Problems Brother     No Known Problems Son     No Known Problems Brother     No Known Problems Brother     No Known Problems Brother     Cancer Neg Hx     Diabetes Neg Hx     Stroke Neg Hx     Thyroid cancer Neg Hx     Breast cancer Neg Hx     Colon cancer Neg Hx     Ovarian cancer Neg Hx        OBHx:   OB History    Para Term  AB Living   2 1 1 0 0 1   SAB TAB Ectopic Multiple Live Births   0 0 0 0 1      # Outcome Date GA Lbr Kobe/2nd Weight Sex Delivery Anes PTL Lv   2 Current            1 Term 16 40w1d  3.445 kg (7 lb 9.5 oz) M Vag-Spont EPI N SURI      Name: SAMMI CEJA      Apgar1: 9  Apgar5: 9       Objective:       /88   Pulse 79   Temp 97.3 °F (36.3 °C) (Temporal)   Resp 16   LMP 2018   SpO2 100%   Breastfeeding? No     Vitals:    19   BP: 139/88     Pulse: 84 77 79   Resp: 16      Temp: 97.3 °F (36.3 °C)     TempSrc: Temporal     SpO2: 100%  100%       General:   alert, appears stated age and cooperative, no apparent distress   HENT:  normocephalic, atraumatic   Eyes:  extraocular movements and conjunctivae normal   Neck:  supple, range of motion normal, no thyromegaly   Lungs:   no respiratory distress   Heart:   regular rate   Abdomen:  soft, non-tender, non-distended but gravid, no rebound or guarding    Extremities negative edema, negative erythema   FHT: 150, moderate BTBV, +accels, -decels;  Cat 1 (reassuring)                 TOCO: irritability   Presentations: cephalic by ultrasound   Cervix:     Dilation: 2cm    Effacement: 50%    Station:  -2    Consistency: medium    Position: posterior     Lab Review  Blood Type O POS  GBBS: positive  Rubella: Immune  RPR: NR  HIV: negative  HepB: negative       Assessment:       37w5d weeks gestation for IOL for gHTN.    Active Hospital Problems    Diagnosis  POA    Normal labor and delivery [O80]  Not Applicable      Resolved Hospital Problems   No resolved problems to display.          Plan:   Labor and Delivery Induction   Risks, benefits, alternatives and possible complications have been discussed in detail with the patient.   - Consents signed and to chart  - Admit to Labor and Delivery unit   - Epidural per Anesthesia  - Draw CBC, T&S  - Notified Dr. Osorio  - Recheck in 2 hrs or PRN  -Will start with pitocin and ROM when possible    gHTN  -denies HA, visual changes, RUQ pain  -pre e labs pending    Obesity (BMI =35)  -encourage ambulation when appropriate  -scd and fabiano hose    Hyperprolactinemia  -on no meds during pregnancy (stopped cabergoline)  -controlled    Post-Partum Hemorrhage risk - low    Majo Velasco MD  OBGYN PGY-1

## 2019-08-24 NOTE — PROGRESS NOTES
POSTPARTUM PROGRESS NOTE     Meghna Aragon is a 34 y.o. female PPD #1 status post Spontaneous vaginal delivery at 37w6d in a pregnancy complicated by gestational hypertension, GBS positive culture, obesity and prolactinoma.     Patient is doing well this morning. She denies nausea, vomiting, fever or chills.  Patient reports mild abdominal pain that is well relieved by oral pain medications. Lochia is mild. Patient is voiding without difficulty and ambulating with no difficulty. She has passed flatus, and has not had BM.  Patient does plan to breast feed. Patient will defer to primary ob for contraception. She desires circumcision which has been deferred to outpatient by peds.     Objective:       Temp:  [96.9 °F (36.1 °C)-98 °F (36.7 °C)] 97.9 °F (36.6 °C)  Pulse:  [] 83  Resp:  [16-20] 18  SpO2:  [85 %-100 %] 100 %  BP: ()/(57-94) 137/81    General:   alert, appears stated age and cooperative   Lungs:   clear to auscultation bilaterally   Heart:   regular rate and rhythm, S1, S2 normal, no murmur, click, rub or gallop   Abdomen:  soft, non-tender; bowel sounds normal; no masses,  no organomegaly   Uterus:  firm located at the umblicus.        Extremities: peripheral pulses normal, no pedal edema, no clubbing or cyanosis     Lab Review  No results found for this or any previous visit (from the past 4 hour(s)).    I/O    Intake/Output Summary (Last 24 hours) at 2019 1625  Last data filed at 2019 1215  Gross per 24 hour   Intake 2091.17 ml   Output 1050 ml   Net 1041.17 ml        Assessment:     Patient Active Problem List   Diagnosis    Chronic tension headache    Obesity (BMI 35.0-39.9 without comorbidity)    Hyperprolactinemia    Thyroid nodule    Cyst, ovary, dermoid, right    GBS (group B Streptococcus carrier), +RV culture, currently pregnant    Gestational hypertension, third trimester    Normal labor and delivery     (spontaneous vaginal delivery)        Plan:   1.  Postpartum care:  - Patient doing well. Continue routine management and advances.  - Continue PO pain meds. Pain well controlled.  - Heme: H/h 11/35 > p  - Encourage ambulation  - Circumcision deferred per peds  - Contraception per primary ob   - Lactation consultation PRN  - Rh status O pos    2. Gestational hypertension   - on no meds   - BP: ()/(57-94) 133/76      3. Prolactinoma   - cabergoline has been discontinued throughout pregnancy   - follow up post partum         Dispo: As patient meets milestones, will plan to discharge PPD#2.    Tonja Turk MD  OBGYN, PGY-2

## 2019-08-24 NOTE — ANESTHESIA PROCEDURE NOTES
Epidural    Patient location during procedure: OB   Reason for block: primary anesthetic   Diagnosis: labor; iup   Start time: 8/24/2019 2:38 AM  Timeout: 8/24/2019 2:35 AM  End time: 8/24/2019 3:10 AM    Staffing  Performing Provider: Shanna Allred MD  Authorizing Provider: Estelita Mccord MD        Preanesthetic Checklist  Completed: patient identified, surgical consent, pre-op evaluation, timeout performed, IV checked, risks and benefits discussed, monitors and equipment checked, anesthesia consent given, hand hygiene performed and patient being monitored  Preparation  Patient position: sitting  Prep: ChloraPrep  Patient monitoring: Pulse Ox and Blood Pressure  Epidural  Skin Anesthetic: lidocaine 1%  Skin Wheal: 3 mL  Administration type: continuous  Approach: midline  Interspace: L4-5    Injection technique: RADHA air  Needle and Epidural Catheter  Needle type: Tuohy   Needle gauge: 17  Needle length: 3.5 (Tuohy hubbed plus some additional skin indentation/pressure for RADHA) inches  Needle insertion depth: 10 cm  Catheter type: multi-orifice  Catheter size: 18 G  Catheter at skin depth: 15 cm  Test dose: 3 mL of lidocaine 1.5% with Epi 1-to-200,000  Additional Documentation: incremental injection, negative aspiration for heme and CSF, no signs/symptoms of IV or SA injection, no significant pain on injection, no significant complaints from patient and left transient paresthesia  Needle localization: anatomical landmarks  Medications:  Volume per aspiration: 5 mL  Time between aspirations: 5 minutes  Assessment  Upper dermatomal levels - Left: T6  Right: T5   Dermatomal levels determined by ice  Ease of block: moderate  Patient's tolerance of the procedure: comfortable throughout block and no complaintsNo inadvertent dural puncture with Tuohy.  Dural puncture performed with spinal needle.

## 2019-08-24 NOTE — PLAN OF CARE
Problem: Breastfeeding  Goal: Effective Breastfeeding  Based on pt's stated feeding goals, the Plan of care for today is for pt to do skin-to-skin, to feed frequently on demand, to observe for signs of effective milk transfer, to monitor voids and stools, and to call for assistance. Pt may breastfeed and/ or express breastmilk with hand expression, and provide EBM to baby with cup, or spoon, as needed. Pt verbalizes understanding.

## 2019-08-24 NOTE — ANESTHESIA PREPROCEDURE EVALUATION
[facility]  Anesthesia Pre-Operative Evaluation         Patient Name: Meghna Aragon  YOB: 1985  MRN: [id number]    2019      Meghna Aragon is a 34 y.o. female  @ 37w5d who presents for IOL due to gHTN. This IUP by gHTN. She denies Hx of asthma, back surgery, scoliosis, or bleeding disorders. She desires an epidural.       OB History    Para Term  AB Living   2 1 1 0 0 1   SAB TAB Ectopic Multiple Live Births   0 0 0 0 1      # Outcome Date GA Lbr Kobe/2nd Weight Sex Delivery Anes PTL Lv   2 Current            1 Term 16 40w1d  3.445 kg (7 lb 9.5 oz) M Vag-Spont EPI N SURI       Wt Readings from Last 1 Encounters:   19 1712 103 kg (227 lb 1.2 oz)       BP Readings from Last 3 Encounters:   19 (!) 148/89   19 122/80   19 128/89       Patient Active Problem List   Diagnosis    Chronic tension headache    Obesity (BMI 35.0-39.9 without comorbidity)    Hyperprolactinemia    Thyroid nodule    Cyst, ovary, dermoid, right    GBS (group B Streptococcus carrier), +RV culture, currently pregnant    Gestational hypertension, third trimester       Past Surgical History:   Procedure Laterality Date    MOUTH SURGERY         Social History     Socioeconomic History    Marital status:      Spouse name: Not on file    Number of children: Not on file    Years of education: Not on file    Highest education level: Not on file   Occupational History    Occupation: MFM sonographer    Social Needs    Financial resource strain: Not on file    Food insecurity:     Worry: Not on file     Inability: Not on file    Transportation needs:     Medical: Not on file     Non-medical: Not on file   Tobacco Use    Smoking status: Never Smoker    Smokeless tobacco: Never Used   Substance and Sexual Activity    Alcohol use: No    Drug use: No    Sexual activity: Yes     Partners: Male     Birth control/protection: None    Lifestyle    Physical activity:     Days per week: Not on file     Minutes per session: Not on file    Stress: Not on file   Relationships    Social connections:     Talks on phone: Not on file     Gets together: Not on file     Attends Mosque service: Not on file     Active member of club or organization: Not on file     Attends meetings of clubs or organizations: Not on file     Relationship status: Not on file   Other Topics Concern    Not on file   Social History Narrative    Not on file         Chemistry        Component Value Date/Time     05/18/2018 0707    K 4.4 05/18/2018 0707     05/18/2018 0707    CO2 25 05/18/2018 0707    BUN 20 05/18/2018 0707    CREATININE 0.9 08/19/2019 1213     05/18/2018 0707        Component Value Date/Time    CALCIUM 9.3 05/18/2018 0707    ALKPHOS 69 05/18/2018 0707    AST 20 08/19/2019 1213    ALT 11 05/18/2018 0707    BILITOT 0.2 05/18/2018 0707    ESTGFRAFRICA >60 08/19/2019 1213    EGFRNONAA >60 08/19/2019 1213            Lab Results   Component Value Date    WBC 6.98 08/19/2019    HGB 11.8 (L) 08/19/2019    HCT 36.2 (L) 08/19/2019    MCV 82 08/19/2019     (L) 08/19/2019       No results for input(s): PT, INR, PROTIME, APTT in the last 72 hours.        Anesthesia Evaluation    I have reviewed the Patient Summary Reports.    I have reviewed the Nursing Notes.   I have reviewed the Medications.     Review of Systems  Anesthesia Hx:  No problems with previous Anesthesia  Neg history of prior surgery. Denies Family Hx of Anesthesia complications.    Cardiovascular:   Hypertension    Pulmonary:  Pulmonary Normal    Renal/:  Renal/ Normal     Hepatic/GI:  Hepatic/GI Normal    Musculoskeletal:  Musculoskeletal Normal    Neurological:   Headaches    Endocrine:  Endocrine Normal        Physical Exam  General:  Well nourished, Obesity    Airway/Jaw/Neck:  Airway Findings: Mouth Opening: Normal Tongue: Normal  Mallampati: IV  Improves to III with  phonation.  TM Distance: Normal, at least 6 cm  Jaw/Neck Findings:  Neck ROM: Normal ROM      Dental:  Dental Findings: In tact        Mental Status:  Mental Status Findings:  Cooperative, Alert and Oriented         Anesthesia Plan  Type of Anesthesia, risks & benefits discussed:  Anesthesia Type:  CSE, epidural, general, spinal  Patient's Preference:   Intra-op Monitoring Plan: standard ASA monitors  Intra-op Monitoring Plan Comments:   Post Op Pain Control Plan: per primary service following discharge from PACU, epidural analgesia and multimodal analgesia  Post Op Pain Control Plan Comments:   Induction:   IV  Beta Blocker:  Patient is not currently on a Beta-Blocker (No further documentation required).       Informed Consent: Patient understands risks and agrees with Anesthesia plan.  Questions answered. Anesthesia consent signed with patient.  ASA Score: 2     Day of Surgery Review of History & Physical:    H&P update referred to the provider.         Ready For Surgery From Anesthesia Perspective.

## 2019-08-24 NOTE — LACTATION NOTE
RN at bedside assisting pt with HE and spoon feeding, infant sleepy. Lactation Basics education completed. LC reviewed Breastfeeding Guide and encouraged tracking feeds and output. Encouraged use of STS, frequent feeds on demand, and avoiding artificial nipples. Pt verbalized understanding. Pt aware to call LC for assistance with feeding.

## 2019-08-24 NOTE — PROGRESS NOTES
LABOR PROGRESS:    PATIENT is COMFORTABLE with EPIDURAL,     PE:   BP (!) 145/65   Pulse 77   Temp 96.9 °F (36.1 °C) (Temporal)   Resp 20   SpO2 100%    Gen: Alert, oriented, no acute distress   Cardio:regular rate and rhythm, no murmurs   Resp:clear to auscultation  Pelvic: EXTERNAL GENITALIA: normal appearing vulva with no masses, tenderness or lesions  Cervix 4/ 60%/ -2, AROM CLEAR  Presentation: cephalic   FHT: 140 REACT WITHOUT DECELERATIONS  UC EVERY 2-4 MIN, PITOCIN 14 MU/MIN    Assessment and Plan:   Early latent labor. INDUCTION FOR GESTATIONAL HTN WITH SOME SUSTAINED INCREASED BP TODAY  REASSURING FHR REACTIVITY  IV Pitocin induction  anticipate vaginal delivery  PCN FOR HX GBS POSITIVITY

## 2019-08-24 NOTE — PROGRESS NOTES
LABOR NOTE    S:  Complaints: No.  Epidural working:  not applicable      O: BP (!) 154/82   Pulse 72   Temp 97.3 °F (36.3 °C) (Temporal)   Resp 16   LMP 2018   SpO2 100%   Breastfeeding? No       FHT: 135, min to mod, no accels no decels Cat 2 (reassuring)  CTX: q 2-5 minutes  SVE: 3/50/-2 @ 2330       ASSESSMENT:   34 y.o.  at 37w5d, present for IOL for gHTN    FHT reassuring    Active Hospital Problems    Diagnosis  POA    Normal labor and delivery [O80]  Not Applicable      Resolved Hospital Problems   No resolved problems to display.     Course:  945: /-2  1130: 3/50/-2, pit at 6  PLAN:    Continue Close Maternal/Fetal Monitoring  Pitocin Augmentation per protocol  Recheck 2 hours or PRN\    Majo Velasco MD  OBGYN PGY-1

## 2019-08-24 NOTE — LACTATION NOTE
1545 Pt called LC to room for assistance with feeding, while LC positioning infant pt states she needs to go to bathroom. RN to room to assist, pt educated to call LC when finished with bathroom for feeding assistance.    1615 LC to room, pt with visitors, infant swaddled and sleeping in bassinet. LC encouraged feeding attempt within next hour or earlier with cues, educated pt that if infant does not latch then to HE and spoon feed. Encouraged pt to call for assistance with next feeding. Pt verbalizes understanding and questions answered.

## 2019-08-25 VITALS
HEART RATE: 87 BPM | RESPIRATION RATE: 18 BRPM | DIASTOLIC BLOOD PRESSURE: 70 MMHG | OXYGEN SATURATION: 98 % | TEMPERATURE: 98 F | SYSTOLIC BLOOD PRESSURE: 143 MMHG

## 2019-08-25 PROCEDURE — 25000003 PHARM REV CODE 250: Performed by: STUDENT IN AN ORGANIZED HEALTH CARE EDUCATION/TRAINING PROGRAM

## 2019-08-25 PROCEDURE — 99024 POSTOP FOLLOW-UP VISIT: CPT | Mod: ,,, | Performed by: OBSTETRICS & GYNECOLOGY

## 2019-08-25 PROCEDURE — 90471 IMMUNIZATION ADMIN: CPT | Performed by: OBSTETRICS & GYNECOLOGY

## 2019-08-25 PROCEDURE — 90686 IIV4 VACC NO PRSV 0.5 ML IM: CPT | Performed by: OBSTETRICS & GYNECOLOGY

## 2019-08-25 PROCEDURE — 99024 PR POST-OP FOLLOW-UP VISIT: ICD-10-PCS | Mod: ,,, | Performed by: OBSTETRICS & GYNECOLOGY

## 2019-08-25 PROCEDURE — 63600175 PHARM REV CODE 636 W HCPCS: Performed by: OBSTETRICS & GYNECOLOGY

## 2019-08-25 RX ADMIN — DOCUSATE SODIUM 200 MG: 100 CAPSULE, LIQUID FILLED ORAL at 08:08

## 2019-08-25 RX ADMIN — IBUPROFEN 600 MG: 600 TABLET, FILM COATED ORAL at 12:08

## 2019-08-25 RX ADMIN — IBUPROFEN 600 MG: 600 TABLET, FILM COATED ORAL at 04:08

## 2019-08-25 RX ADMIN — INFLUENZA VIRUS VACCINE 0.5 ML: 15; 15; 15; 15 SUSPENSION INTRAMUSCULAR at 12:08

## 2019-08-25 NOTE — PLAN OF CARE
Problem: Adult Inpatient Plan of Care  Goal: Plan of Care Review  Outcome: Outcome(s) achieved Date Met: 08/25/19  Pt AA0x3 and VSS. Family at bedside. Two side rails up, bed locked, call light within reach. No falls noted as these precautions remain. Pt free of skin breakdown as the pt moves well independently. Voiding without difficulty. Passing gas. Fundus firm and midline. Vaginal bleeding mild. Pain controlled with po motrin. Signs and symptoms of pre-eclampsia reviewed with pt, pt verbalized understanding. Mother baby care guide reviewed with pt and significant other, both verbalized understanding. Discharge instructions, prescription, and follow up appointments reviewed with pt, pt verbalized understanding. ID band verified with infant.

## 2019-08-25 NOTE — DISCHARGE SUMMARY
Delivery Discharge Summary  Obstetrics      Primary OB Clinician: Lucia Osorio MD      Admission date: 2019  Discharge date: 2019    Disposition: To home, self care    Discharge Diagnosis List:      Patient Active Problem List   Diagnosis    Chronic tension headache    Obesity (BMI 35.0-39.9 without comorbidity)    Hyperprolactinemia    Thyroid nodule    Cyst, ovary, dermoid, right    GBS (group B Streptococcus carrier), +RV culture, currently pregnant    Gestational hypertension, third trimester     (spontaneous vaginal delivery)       Procedure:     Hospital Course:  Meghna Aragon is a 34 y.o. now , PPD #1 who was admitted on 2019 at 37w6d for IOL for gHTN. Patient was subsequently admitted to labor and delivery unit with signed consents.     Labor course was uncomplicated and resulted in  without complications.     Please see delivery note for further details. Her postpartum course was uncomplicated. On discharge day, patient's pain is controlled with oral pain medications. Pt is tolerating ambulation without SOB or CP, and regular diet without N/V. Reports lochia is mild. Denies any HA, vision changes, F/C, LE swelling. Denies any breast pain/soreness.    Pt in stable condition and ready for discharge. She has been instructed to start and/or continue medications and follow up with her obstetrics provider as listed below.    Pertinent studies:  CBC  Recent Labs   Lab 19  1213 19  2042 19   WBC 6.98 6.94 11.93   HGB 11.8* 11.0* 10.7*   HCT 36.2* 34.7* 32.7*   MCV 82 84 82   * 160 156          Immunization History   Administered Date(s) Administered    Influenza 10/18/2018    Influenza - Quadrivalent - PF (6 months and older) 10/01/2016, 2017    Measles 2000    Mumps 2000    Rubella 2000    Td (ADULT) 2000    Tdap 2016, 2019        Delivery:    Episiotomy: None   Lacerations: 2nd    Repair suture:     Repair # of packets: 1   Blood loss (ml): 200     Birth information:  YOB: 2018   Time of birth: 7:43 AM   Sex: male   Delivery type: Vaginal, Spontaneous   Gestational Age: 37w6d    Delivery Clinician:      Other providers:       Additional  information:  Forceps:    Vacuum:    Breech:    Observed anomalies      Living?:           APGARS  One minute Five minutes Ten minutes   Skin color:         Heart rate:         Grimace:         Muscle tone:         Breathing:         Totals: 9  9        Placenta: Delivered:       appearance      Patient Instructions:   Current Discharge Medication List      START taking these medications    Details   ibuprofen (ADVIL,MOTRIN) 600 MG tablet Take 1 tablet (600 mg total) by mouth every 6 (six) hours as needed for Pain.  Qty: 30 tablet, Refills: 1         CONTINUE these medications which have NOT CHANGED    Details   PNV NO10/IRON FUM&P/FA/OMEGA-3 (PNV #10-IRON [id number] ORAL) Take by mouth.             Discharge Procedure Orders   Diet Adult Regular     Other restrictions (specify):   Order Comments: Pelvic rest, nothing in the vagina, for 6 weeks. No baths for 6 weeks, only shower. Don't drive a car if you are on narcotics.     Notify your health care provider if you experience any of the following:  temperature >100.4     Notify your health care provider if you experience any of the following:  persistent nausea and vomiting or diarrhea     Notify your health care provider if you experience any of the following:  severe uncontrolled pain     Notify your health care provider if you experience any of the following:  redness, tenderness, or signs of infection (pain, swelling, redness, odor or green/yellow discharge around incision site)     Notify your health care provider if you experience any of the following:  difficulty breathing or increased cough     Notify your health care provider if you experience any of the following:  severe  persistent headache     Notify your health care provider if you experience any of the following:  worsening rash     Notify your health care provider if you experience any of the following:  persistent dizziness, light-headedness, or visual disturbances     Notify your health care provider if you experience any of the following:  increased confusion or weakness     Notify your health care provider if you experience any of the following:   Order Comments: If you have vaginal bleeding greater than 1 pad per hour for 2 hours     Activity as tolerated       Follow-up Information     Lucia Osorio MD In 1 week.    Specialties:  Obstetrics, Obstetrics and Gynecology  Why:  Blood Pressure check  Contact information:  [address] [id number] [phone]             Lucia Osorio MD In 6 weeks.    Specialties:  Obstetrics, Obstetrics and Gynecology  Why:  Post Partum Visit  Contact information:  [address] [id number] [phone]                    Majo Velasco MD  OBGYN PGY-1

## 2019-08-25 NOTE — LACTATION NOTE
Lactation discharge instructions given to mother. Mother reports baby is nursing well. Reviewed Mother's Breastfeeding Guide.  number on board to call for breastfeeding assessment prior to discharge.

## 2019-08-26 ENCOUNTER — PATIENT MESSAGE (OUTPATIENT)
Dept: OBSTETRICS AND GYNECOLOGY | Facility: CLINIC | Age: 34
End: 2019-08-26

## 2019-08-27 ENCOUNTER — TELEPHONE (OUTPATIENT)
Dept: OBSTETRICS AND GYNECOLOGY | Facility: CLINIC | Age: 34
End: 2019-08-27

## 2019-08-28 ENCOUNTER — HOSPITAL ENCOUNTER (INPATIENT)
Facility: OTHER | Age: 34
LOS: 2 days | Discharge: HOME | DRG: 776 | End: 2019-08-30
Attending: OBSTETRICS & GYNECOLOGY | Admitting: OBSTETRICS & GYNECOLOGY
Payer: COMMERCIAL

## 2019-08-28 DIAGNOSIS — J81.1 PULMONARY EDEMA: ICD-10-CM

## 2019-08-28 DIAGNOSIS — J81.0 ACUTE PULMONARY EDEMA: ICD-10-CM

## 2019-08-28 DIAGNOSIS — R07.9 CHEST PAIN: ICD-10-CM

## 2019-08-28 DIAGNOSIS — M79.89 SWELLING OF RIGHT LOWER EXTREMITY: ICD-10-CM

## 2019-08-28 LAB
ALBUMIN SERPL BCP-MCNC: 2.7 G/DL (ref 3.5–5.2)
ALP SERPL-CCNC: 121 U/L (ref 55–135)
ALT SERPL W/O P-5'-P-CCNC: 77 U/L (ref 10–44)
ANION GAP SERPL CALC-SCNC: 9 MMOL/L (ref 8–16)
AST SERPL-CCNC: 83 U/L (ref 10–40)
BASOPHILS # BLD AUTO: 0.02 K/UL (ref 0–0.2)
BASOPHILS NFR BLD: 0.3 % (ref 0–1.9)
BILIRUB SERPL-MCNC: 0.2 MG/DL (ref 0.1–1)
BUN SERPL-MCNC: 15 MG/DL (ref 6–20)
CALCIUM SERPL-MCNC: 8.8 MG/DL (ref 8.7–10.5)
CHLORIDE SERPL-SCNC: 112 MMOL/L (ref 95–110)
CO2 SERPL-SCNC: 23 MMOL/L (ref 23–29)
CREAT SERPL-MCNC: 0.8 MG/DL (ref 0.5–1.4)
DIFFERENTIAL METHOD: ABNORMAL
EOSINOPHIL # BLD AUTO: 0.1 K/UL (ref 0–0.5)
EOSINOPHIL NFR BLD: 1.7 % (ref 0–8)
ERYTHROCYTE [DISTWIDTH] IN BLOOD BY AUTOMATED COUNT: 17.1 % (ref 11.5–14.5)
EST. GFR  (AFRICAN AMERICAN): >60 ML/MIN/1.73 M^2
EST. GFR  (NON AFRICAN AMERICAN): >60 ML/MIN/1.73 M^2
GLUCOSE SERPL-MCNC: 92 MG/DL (ref 70–110)
HCT VFR BLD AUTO: 29.1 % (ref 37–48.5)
HGB BLD-MCNC: 9.1 G/DL (ref 12–16)
IMM GRANULOCYTES # BLD AUTO: 0.08 K/UL (ref 0–0.04)
IMM GRANULOCYTES NFR BLD AUTO: 1.1 % (ref 0–0.5)
LYMPHOCYTES # BLD AUTO: 1.5 K/UL (ref 1–4.8)
LYMPHOCYTES NFR BLD: 19.6 % (ref 18–48)
MCH RBC QN AUTO: 26.8 PG (ref 27–31)
MCHC RBC AUTO-ENTMCNC: 31.3 G/DL (ref 32–36)
MCV RBC AUTO: 86 FL (ref 82–98)
MONOCYTES # BLD AUTO: 0.6 K/UL (ref 0.3–1)
MONOCYTES NFR BLD: 7.9 % (ref 4–15)
NEUTROPHILS # BLD AUTO: 5.3 K/UL (ref 1.8–7.7)
NEUTROPHILS NFR BLD: 69.4 % (ref 38–73)
NRBC BLD-RTO: 0 /100 WBC
PLATELET # BLD AUTO: 224 K/UL (ref 150–350)
PMV BLD AUTO: 12.3 FL (ref 9.2–12.9)
POTASSIUM SERPL-SCNC: 4.4 MMOL/L (ref 3.5–5.1)
PROT SERPL-MCNC: 5.9 G/DL (ref 6–8.4)
RBC # BLD AUTO: 3.4 M/UL (ref 4–5.4)
SODIUM SERPL-SCNC: 144 MMOL/L (ref 136–145)
TROPONIN I SERPL DL<=0.01 NG/ML-MCNC: 0.01 NG/ML (ref 0–0.03)
WBC # BLD AUTO: 7.57 K/UL (ref 3.9–12.7)

## 2019-08-28 PROCEDURE — 80053 COMPREHEN METABOLIC PANEL: CPT

## 2019-08-28 PROCEDURE — 93005 ELECTROCARDIOGRAM TRACING: CPT

## 2019-08-28 PROCEDURE — 96376 TX/PRO/DX INJ SAME DRUG ADON: CPT

## 2019-08-28 PROCEDURE — 25000003 PHARM REV CODE 250: Performed by: OBSTETRICS & GYNECOLOGY

## 2019-08-28 PROCEDURE — 93010 EKG 12-LEAD: ICD-10-PCS | Mod: ,,, | Performed by: INTERNAL MEDICINE

## 2019-08-28 PROCEDURE — 85025 COMPLETE CBC W/AUTO DIFF WBC: CPT

## 2019-08-28 PROCEDURE — 96374 THER/PROPH/DIAG INJ IV PUSH: CPT

## 2019-08-28 PROCEDURE — 84484 ASSAY OF TROPONIN QUANT: CPT

## 2019-08-28 PROCEDURE — 11000001 HC ACUTE MED/SURG PRIVATE ROOM

## 2019-08-28 PROCEDURE — 99285 EMERGENCY DEPT VISIT HI MDM: CPT | Mod: 24,,, | Performed by: OBSTETRICS & GYNECOLOGY

## 2019-08-28 PROCEDURE — 96375 TX/PRO/DX INJ NEW DRUG ADDON: CPT

## 2019-08-28 PROCEDURE — 99285 EMERGENCY DEPT VISIT HI MDM: CPT | Mod: 25

## 2019-08-28 PROCEDURE — 63600175 PHARM REV CODE 636 W HCPCS: Performed by: OBSTETRICS & GYNECOLOGY

## 2019-08-28 PROCEDURE — 93010 ELECTROCARDIOGRAM REPORT: CPT | Mod: ,,, | Performed by: INTERNAL MEDICINE

## 2019-08-28 PROCEDURE — 99223 PR INITIAL HOSPITAL CARE,LEVL III: ICD-10-PCS | Mod: AI,24,, | Performed by: OBSTETRICS & GYNECOLOGY

## 2019-08-28 PROCEDURE — 99285 PR EMERGENCY DEPT VISIT,LEVEL V: ICD-10-PCS | Mod: 24,,, | Performed by: OBSTETRICS & GYNECOLOGY

## 2019-08-28 PROCEDURE — 99223 1ST HOSP IP/OBS HIGH 75: CPT | Mod: AI,24,, | Performed by: OBSTETRICS & GYNECOLOGY

## 2019-08-28 PROCEDURE — 99900035 HC TECH TIME PER 15 MIN (STAT)

## 2019-08-28 RX ORDER — MAGNESIUM SULFATE HEPTAHYDRATE 40 MG/ML
2 INJECTION, SOLUTION INTRAVENOUS CONTINUOUS
Status: DISCONTINUED | OUTPATIENT
Start: 2019-08-28 | End: 2019-08-29

## 2019-08-28 RX ORDER — LABETALOL HYDROCHLORIDE 5 MG/ML
40 INJECTION, SOLUTION INTRAVENOUS ONCE AS NEEDED
Status: DISCONTINUED | OUTPATIENT
Start: 2019-08-28 | End: 2019-08-30 | Stop reason: HOSPADM

## 2019-08-28 RX ORDER — LABETALOL HYDROCHLORIDE 5 MG/ML
80 INJECTION, SOLUTION INTRAVENOUS ONCE AS NEEDED
Status: DISCONTINUED | OUTPATIENT
Start: 2019-08-28 | End: 2019-08-30 | Stop reason: HOSPADM

## 2019-08-28 RX ORDER — FUROSEMIDE 10 MG/ML
20 INJECTION INTRAMUSCULAR; INTRAVENOUS ONCE
Status: COMPLETED | OUTPATIENT
Start: 2019-08-28 | End: 2019-08-28

## 2019-08-28 RX ORDER — SIMETHICONE 80 MG
2 TABLET,CHEWABLE ORAL ONCE
Status: COMPLETED | OUTPATIENT
Start: 2019-08-28 | End: 2019-08-28

## 2019-08-28 RX ORDER — ACETAMINOPHEN 325 MG/1
650 TABLET ORAL ONCE
Status: DISCONTINUED | OUTPATIENT
Start: 2019-08-28 | End: 2019-08-28

## 2019-08-28 RX ORDER — FUROSEMIDE 10 MG/ML
20 INJECTION INTRAMUSCULAR; INTRAVENOUS 2 TIMES DAILY
Status: DISCONTINUED | OUTPATIENT
Start: 2019-08-28 | End: 2019-08-29

## 2019-08-28 RX ORDER — ACETAMINOPHEN 325 MG/1
650 TABLET ORAL EVERY 6 HOURS PRN
Status: DISCONTINUED | OUTPATIENT
Start: 2019-08-28 | End: 2019-08-30 | Stop reason: HOSPADM

## 2019-08-28 RX ORDER — SODIUM CHLORIDE, SODIUM LACTATE, POTASSIUM CHLORIDE, CALCIUM CHLORIDE 600; 310; 30; 20 MG/100ML; MG/100ML; MG/100ML; MG/100ML
INJECTION, SOLUTION INTRAVENOUS CONTINUOUS
Status: DISCONTINUED | OUTPATIENT
Start: 2019-08-28 | End: 2019-08-29

## 2019-08-28 RX ORDER — ACETAMINOPHEN 500 MG
1000 TABLET ORAL ONCE
Status: COMPLETED | OUTPATIENT
Start: 2019-08-28 | End: 2019-08-28

## 2019-08-28 RX ORDER — SODIUM CHLORIDE, SODIUM LACTATE, POTASSIUM CHLORIDE, CALCIUM CHLORIDE 600; 310; 30; 20 MG/100ML; MG/100ML; MG/100ML; MG/100ML
INJECTION, SOLUTION INTRAVENOUS CONTINUOUS
Status: DISCONTINUED | OUTPATIENT
Start: 2019-08-28 | End: 2019-08-28

## 2019-08-28 RX ORDER — LABETALOL HYDROCHLORIDE 5 MG/ML
40 INJECTION, SOLUTION INTRAVENOUS ONCE
Status: COMPLETED | OUTPATIENT
Start: 2019-08-28 | End: 2019-08-28

## 2019-08-28 RX ORDER — MAGNESIUM SULFATE HEPTAHYDRATE 40 MG/ML
4 INJECTION, SOLUTION INTRAVENOUS ONCE
Status: COMPLETED | OUTPATIENT
Start: 2019-08-28 | End: 2019-08-28

## 2019-08-28 RX ORDER — CALCIUM GLUCONATE 98 MG/ML
1 INJECTION, SOLUTION INTRAVENOUS
Status: DISCONTINUED | OUTPATIENT
Start: 2019-08-28 | End: 2019-08-28

## 2019-08-28 RX ORDER — CALCIUM CARBONATE 200(500)MG
1000 TABLET,CHEWABLE ORAL ONCE
Status: COMPLETED | OUTPATIENT
Start: 2019-08-28 | End: 2019-08-28

## 2019-08-28 RX ORDER — LABETALOL HYDROCHLORIDE 5 MG/ML
20 INJECTION, SOLUTION INTRAVENOUS ONCE
Status: COMPLETED | OUTPATIENT
Start: 2019-08-28 | End: 2019-08-28

## 2019-08-28 RX ORDER — LABETALOL HYDROCHLORIDE 5 MG/ML
20 INJECTION, SOLUTION INTRAVENOUS ONCE AS NEEDED
Status: DISCONTINUED | OUTPATIENT
Start: 2019-08-28 | End: 2019-08-30 | Stop reason: HOSPADM

## 2019-08-28 RX ORDER — MAGNESIUM SULFATE HEPTAHYDRATE 40 MG/ML
2 INJECTION, SOLUTION INTRAVENOUS CONTINUOUS
Status: DISCONTINUED | OUTPATIENT
Start: 2019-08-28 | End: 2019-08-28

## 2019-08-28 RX ORDER — CALCIUM GLUCONATE 98 MG/ML
1 INJECTION, SOLUTION INTRAVENOUS
Status: DISCONTINUED | OUTPATIENT
Start: 2019-08-28 | End: 2019-08-30 | Stop reason: HOSPADM

## 2019-08-28 RX ORDER — HYDRALAZINE HYDROCHLORIDE 20 MG/ML
10 INJECTION INTRAMUSCULAR; INTRAVENOUS ONCE AS NEEDED
Status: DISCONTINUED | OUTPATIENT
Start: 2019-08-28 | End: 2019-08-30 | Stop reason: HOSPADM

## 2019-08-28 RX ORDER — DOCUSATE SODIUM 100 MG/1
100 CAPSULE, LIQUID FILLED ORAL DAILY
Status: DISCONTINUED | OUTPATIENT
Start: 2019-08-29 | End: 2019-08-30 | Stop reason: HOSPADM

## 2019-08-28 RX ORDER — POTASSIUM CHLORIDE 20 MEQ/1
20 TABLET, EXTENDED RELEASE ORAL ONCE
Status: COMPLETED | OUTPATIENT
Start: 2019-08-28 | End: 2019-08-28

## 2019-08-28 RX ADMIN — CALCIUM CARBONATE 1000 MG: 500 TABLET, CHEWABLE ORAL at 05:08

## 2019-08-28 RX ADMIN — MAGNESIUM SULFATE HEPTAHYDRATE 4 G: 40 INJECTION, SOLUTION INTRAVENOUS at 05:08

## 2019-08-28 RX ADMIN — SODIUM CHLORIDE, SODIUM LACTATE, POTASSIUM CHLORIDE, AND CALCIUM CHLORIDE: .6; .31; .03; .02 INJECTION, SOLUTION INTRAVENOUS at 06:08

## 2019-08-28 RX ADMIN — MAGNESIUM SULFATE HEPTAHYDRATE 2 G/HR: 40 INJECTION, SOLUTION INTRAVENOUS at 06:08

## 2019-08-28 RX ADMIN — LABETALOL HYDROCHLORIDE 40 MG: 5 INJECTION INTRAVENOUS at 06:08

## 2019-08-28 RX ADMIN — LABETALOL HYDROCHLORIDE 20 MG: 5 INJECTION INTRAVENOUS at 05:08

## 2019-08-28 RX ADMIN — POTASSIUM CHLORIDE 20 MEQ: 1500 TABLET, EXTENDED RELEASE ORAL at 09:08

## 2019-08-28 RX ADMIN — SIMETHICONE CHEW TAB 80 MG 160 MG: 80 TABLET ORAL at 05:08

## 2019-08-28 RX ADMIN — FUROSEMIDE 20 MG: 10 INJECTION, SOLUTION INTRAMUSCULAR; INTRAVENOUS at 09:08

## 2019-08-28 RX ADMIN — ACETAMINOPHEN 1000 MG: 500 TABLET ORAL at 05:08

## 2019-08-28 RX ADMIN — FUROSEMIDE 20 MG: 10 INJECTION, SOLUTION INTRAMUSCULAR; INTRAVENOUS at 06:08

## 2019-08-28 NOTE — ANESTHESIA POSTPROCEDURE EVALUATION
Anesthesia Post Evaluation    Patient: Meghna Aragon    Procedure(s) Performed: * No procedures listed *    Final Anesthesia Type: epidural  Patient location during evaluation: floor  Patient participation: Yes- Able to Participate  Level of consciousness: awake and alert and oriented  Post-procedure vital signs: reviewed and stable  Pain management: adequate  Airway patency: patent  PONV status at discharge: No PONV  Anesthetic complications: no      Cardiovascular status: hemodynamically stable, blood pressure returned to baseline and stable  Respiratory status: unassisted, spontaneous ventilation and room air  Hydration status: euvolemic  Follow-up not needed.          Vitals Value Taken Time   /70 8/25/2019 12:20 PM   Temp 36.7 °C (98.1 °F) 8/25/2019 12:20 PM   Pulse 87 8/25/2019 12:20 PM   Resp 18 8/25/2019  4:20 AM   SpO2 98 % 8/25/2019 12:20 PM         No case tracking events are documented in the log.      Pain/Timoteo Score: No data recorded

## 2019-08-28 NOTE — ED PROVIDER NOTES
Encounter Date: 2019       History     Chief Complaint   Patient presents with    Abdominal Pain     midline upper gastric     Meghna is a 35yo  PPD4  after IOL due to gHTN.  Her BP's remained mild range while her in hospital, and did not require pushes/mag/meds.  All labs normal at that time.   Today, around noon, she started having midsternal chest pain and belching, as well as just not feeling well.  She states that she was walking and eating and drinking, but pain did not improve.  She then noticed a 5/10 headache.   This made her concerned due to previous pre-eclampsia precautions, so she came in.   Her CP does not radiate to her arm/chin, she is not diaphoretic.   Her headache is mild, and not associated with vision changes.   Her PNC was with Dr. Osorio and assd with ghtn and hyperprolactinemia        Review of patient's allergies indicates:   Allergen Reactions    Codeine Itching     Past Medical History:   Diagnosis Date    Anemia     Hyperprolactinemia     MRI neg for pit mass    Hyperprolactinemia 2017    Multiple thyroid nodules     Ovarian cyst 2016    dermoid on MRI     Past Surgical History:   Procedure Laterality Date    MOUTH SURGERY       Family History   Problem Relation Age of Onset    Heart attacks under age 50 Father 44    Heart disease Father     Heart disease Paternal Grandmother     Hypertension Mother     Thyroid disease Mother     Heart disease Paternal Aunt     Heart disease Paternal Uncle     No Known Problems Sister     No Known Problems Brother     No Known Problems Son     No Known Problems Brother     No Known Problems Brother     No Known Problems Brother     Cancer Neg Hx     Diabetes Neg Hx     Stroke Neg Hx     Thyroid cancer Neg Hx     Breast cancer Neg Hx     Colon cancer Neg Hx     Ovarian cancer Neg Hx      Social History     Tobacco Use    Smoking status: Never Smoker    Smokeless tobacco: Never Used   Substance Use Topics     Alcohol use: No    Drug use: No     Review of Systems   Constitutional: Negative for activity change, appetite change, chills and fever.   HENT: Negative.    Eyes: Negative for photophobia, pain and visual disturbance.   Respiratory: Positive for chest tightness. Negative for apnea, cough, choking, shortness of breath, wheezing and stridor.    Cardiovascular: Positive for chest pain (epigastric) and leg swelling (right leg worse than left). Negative for palpitations.   Gastrointestinal: Positive for abdominal pain (epigastric). Negative for constipation, diarrhea, nausea and vomiting.   Genitourinary: Positive for vaginal bleeding (lochia). Negative for dysuria, pelvic pain, vaginal discharge and vaginal pain.   Musculoskeletal: Negative.    Skin: Negative.    Neurological: Positive for headaches. Negative for dizziness, seizures, facial asymmetry, speech difficulty, light-headedness and numbness.   Psychiatric/Behavioral: Negative.        Physical Exam     Initial Vitals [08/28/19 1725]   BP Pulse Resp Temp SpO2   (!) 194/88 79 -- 97.5 °F (36.4 °C) 96 %      MAP       --         Physical Exam    Vitals reviewed.  Constitutional: She appears well-developed and well-nourished. She is not diaphoretic. No distress.   HENT:   Head: Normocephalic and atraumatic.   Right Ear: External ear normal.   Left Ear: External ear normal.   Nose: Nose normal.   Mouth/Throat: Oropharynx is clear and moist. No oropharyngeal exudate.   Eyes: Conjunctivae are normal. Right eye exhibits no discharge. Left eye exhibits no discharge. No scleral icterus.   Neck: Neck supple.   Cardiovascular: Normal rate, regular rhythm, normal heart sounds and intact distal pulses.   No murmur heard.  Pulmonary/Chest: No respiratory distress. She has no wheezes. She has rhonchi. She has no rales. She exhibits no tenderness.   Crackles right lower lobe   Abdominal: Soft. There is no tenderness. There is no rebound and no guarding.   Fundus NT  No RUQ  ttp   Musculoskeletal: She exhibits edema (2+ right, negative left). She exhibits no tenderness.   Lymphadenopathy:     She has no cervical adenopathy.   Neurological: She is alert and oriented to person, place, and time. She has normal strength and normal reflexes.   Skin: Skin is warm and dry. Capillary refill takes less than 2 seconds. No rash noted. No erythema.   Psychiatric: She has a normal mood and affect. Her behavior is normal. Judgment and thought content normal.         ED Course   Procedures  Labs Reviewed   COMPREHENSIVE METABOLIC PANEL - Abnormal; Notable for the following components:       Result Value    Chloride 112 (*)     Total Protein 5.9 (*)     Albumin 2.7 (*)     AST 83 (*)     ALT 77 (*)     All other components within normal limits   CBC W/ AUTO DIFFERENTIAL - Abnormal; Notable for the following components:    RBC 3.40 (*)     Hemoglobin 9.1 (*)     Hematocrit 29.1 (*)     Mean Corpuscular Hemoglobin 26.8 (*)     Mean Corpuscular Hemoglobin Conc 31.3 (*)     RDW 17.1 (*)     Immature Granulocytes 1.1 (*)     Immature Grans (Abs) 0.08 (*)     All other components within normal limits   TROPONIN I   TROPONIN I     EKG Readings: (Independently Interpreted)   Initial Reading: No STEMI.     ECG Results          EKG 12-lead (Final result)  Result time 08/28/19 18:34:35    Final result by Radha Araujo RRT (08/28/19 18:34:35)                 Narrative:    Radha Araujo RRT     8/28/2019  6:34 PM  EKG done. Results given to MD Susana                            Imaging Results          US Lower Extremity Veins Right (Final result)  Result time 08/28/19 19:01:01    Final result by Mauri Garcia MD (08/28/19 19:01:01)                 Impression:      No evidence of right lower extremity deep venous thrombosis.      Electronically signed by: Mauri Garcia MD  Date:    08/28/2019  Time:    19:01             Narrative:    EXAMINATION:  US LOWER EXTREMITY VEINS RIGHT    CLINICAL  HISTORY:  Other specified soft tissue disorders    TECHNIQUE:  Duplex and color flow Doppler evaluation of the right lower extremity veins was performed.    COMPARISON:  None    FINDINGS:  No evidence of clot involving the bilateral common femoral veins or right greater saphenous, femoral, popliteal, peroneal, anterior and posterior tibial veins.  All venous structures demonstrate normal respiratory phasicity and augment adequately.  No evidence of soft tissue mass or Baker's cyst.                               X-Ray Chest 1 View (Final result)  Result time 08/28/19 18:12:27    Final result by Mauri Garcia MD (08/28/19 18:12:27)                 Impression:      Possible mild pulmonary interstitial edema.      Electronically signed by: Mauri Garcia MD  Date:    08/28/2019  Time:    18:12             Narrative:    EXAMINATION:  XR CHEST 1 VIEW    CLINICAL HISTORY:  Chest pain, unspecified    TECHNIQUE:  Single frontal view of the chest was performed.    COMPARISON:  None    FINDINGS:  Heart appears within normal limits in size, noting magnification on this single AP view.  Lungs are symmetrically expanded.  There is increased interstitial attenuation seen, more prominent within the lower lung zones.  No evidence of focal consolidation, pneumothorax, or significant pleural effusion.  No acute osseous abnormality identified.                                 Medical Decision Making:   History:   Old Medical Records: I decided to obtain old medical records.  Old Records Summarized: records from previous admission(s).       <> Summary of Records: Prenatal & delivery records reviewed; mild range BPs did not require mediations  Initial Assessment:   Severe preeclampsia post partum by  BPs, pulmonary edema & elevated LFTs  Differential Diagnosis:   Cardiomyopathy  PreE  MI    Clinical Tests:   Lab Tests: Ordered and Reviewed  The following lab test(s) were unremarkable: Troponin       <> Summary of Lab: Anemia  postpartum  Elevated LFTs, Cr 0.8    Radiological Study: Ordered and Reviewed  ED Management:  On presentation, pt had several severe range BP's right off the bat - SBP in 180-190's  Getting pre-e labs, pushing labetalol and starting mag  Due to chest pain, getting EKG and CXR- shows mild pulmonary edema; EKG - possible old infarct?  Right leg swelling -negative Doppler  Plan d/w pt/family who are in agreement and w/o questions at this time    Patient stabilized after IV labetalol 20 & 40  to BPs 140s/70s; Lasix 20 mg x 1 -pulse ox 99% & NOT requiring suplpemental oxygen.  Diuresing well. Will send to Antepartum unit & keep on telemetry.    Patient is breast feeding.  Other:   I have discussed this case with another health care provider.       <> Summary of the Discussion: Case reviewed with Primary OB (who saw patient at bedside - Lucia Osorio & YUMIKO on-call).  Patient has a baseline Echocardiogram              Attending Attestation:   Physician Attestation Statement for Resident:  As the supervising MD   Physician Attestation Statement: I have personally seen and examined this patient.   I agree with the above history. -:   As the supervising MD I agree with the above PE.    As the supervising MD I agree with the above treatment, course, plan, and disposition.  I have reviewed and agree with the residents interpretation of the following: lab data and x-rays.  I have reviewed the following: old records at this facility.                    ED Course as of Aug 28 1931   Wed Aug 28, 2019   1821 CXR with pulmonary edema, will give lasix 20mg IV now    [HU]   1833 EKG showed NSR at 96 and possible ant/inf infarct, no previous EKG to compare. D/W anesthesia resident on call - overall appears pretty normal.  Will add troponins to labs    [HU]   1846 Now 2 additional severe range in a row, will push labetalol 40mg IV    [HU]      ED Course User Index  [HU] Genia Meza DO     Clinical Impression:       ICD-10-CM  ICD-9-CM   1. Hypertension in pregnancy, preeclampsia, severe, postpartum condition O14.15 642.54   2. Chest pain R07.9 786.50   3. Swelling of right lower extremity M79.89 729.81   4. Acute pulmonary edema J81.0 518.4                                Ashlee Kam MD  08/28/19 1933

## 2019-08-29 ENCOUNTER — PATIENT OUTREACH (OUTPATIENT)
Dept: ADMINISTRATIVE | Facility: OTHER | Age: 34
End: 2019-08-29

## 2019-08-29 LAB
ALBUMIN SERPL BCP-MCNC: 3 G/DL (ref 3.5–5.2)
ALP SERPL-CCNC: 113 U/L (ref 55–135)
ALT SERPL W/O P-5'-P-CCNC: 79 U/L (ref 10–44)
ANION GAP SERPL CALC-SCNC: 7 MMOL/L (ref 8–16)
ASCENDING AORTA: 1.98 CM
AST SERPL-CCNC: 65 U/L (ref 10–40)
AV INDEX (PROSTH): 0.72
AV MEAN GRADIENT: 7 MMHG
AV PEAK GRADIENT: 13 MMHG
AV VALVE AREA: 2.3 CM2
AV VELOCITY RATIO: 0.71
BILIRUB SERPL-MCNC: 0.3 MG/DL (ref 0.1–1)
BUN SERPL-MCNC: 11 MG/DL (ref 6–20)
CALCIUM SERPL-MCNC: 8.1 MG/DL (ref 8.7–10.5)
CHLORIDE SERPL-SCNC: 104 MMOL/L (ref 95–110)
CO2 SERPL-SCNC: 28 MMOL/L (ref 23–29)
CREAT SERPL-MCNC: 0.8 MG/DL (ref 0.5–1.4)
CV ECHO LV RWT: 0.45 CM
DOP CALC AO PEAK VEL: 1.81 M/S
DOP CALC AO VTI: 38.6 CM
DOP CALC LVOT AREA: 3.2 CM2
DOP CALC LVOT DIAMETER: 2.02 CM
DOP CALC LVOT PEAK VEL: 1.29 M/S
DOP CALC LVOT STROKE VOLUME: 88.69 CM3
DOP CALCLVOT PEAK VEL VTI: 27.69 CM
E WAVE DECELERATION TIME: 95.12 MSEC
E/A RATIO: 1.27
E/E' RATIO: 10 M/S
ECHO LV POSTERIOR WALL: 1 CM (ref 0.6–1.1)
EST. GFR  (AFRICAN AMERICAN): >60 ML/MIN/1.73 M^2
EST. GFR  (NON AFRICAN AMERICAN): >60 ML/MIN/1.73 M^2
FRACTIONAL SHORTENING: 33 % (ref 28–44)
GLUCOSE SERPL-MCNC: 109 MG/DL (ref 70–110)
INTERVENTRICULAR SEPTUM: 0.98 CM (ref 0.6–1.1)
IVRT: 0.09 MSEC
LA MAJOR: 4.82 CM
LA MINOR: 4.54 CM
LA WIDTH: 4.06 CM
LEFT ATRIUM SIZE: 3.06 CM
LEFT ATRIUM VOLUME: 49.38 CM3
LEFT INTERNAL DIMENSION IN SYSTOLE: 2.99 CM (ref 2.1–4)
LEFT VENTRICLE DIASTOLIC VOLUME: 91.82 ML
LEFT VENTRICLE SYSTOLIC VOLUME: 34.72 ML
LEFT VENTRICULAR INTERNAL DIMENSION IN DIASTOLE: 4.49 CM (ref 3.5–6)
LEFT VENTRICULAR MASS: 150.63 G
LV LATERAL E/E' RATIO: 9.64 M/S
LV SEPTAL E/E' RATIO: 10.38 M/S
MAGNESIUM SERPL-MCNC: 4.9 MG/DL (ref 1.6–2.6)
MV PEAK A VEL: 1.06 M/S
MV PEAK E VEL: 1.35 M/S
PHOSPHATE SERPL-MCNC: 4.5 MG/DL (ref 2.7–4.5)
PISA TR MAX VEL: 2.88 M/S
POTASSIUM SERPL-SCNC: 4 MMOL/L (ref 3.5–5.1)
PROT SERPL-MCNC: 6.6 G/DL (ref 6–8.4)
PULM VEIN S/D RATIO: 1.38
PV PEAK D VEL: 0.42 M/S
PV PEAK S VEL: 0.58 M/S
RA MAJOR: 4.35 CM
RA WIDTH: 3.59 CM
RIGHT VENTRICULAR END-DIASTOLIC DIMENSION: 3.49 CM
SINUS: 2.34 CM
SODIUM SERPL-SCNC: 139 MMOL/L (ref 136–145)
STJ: 2.05 CM
TDI LATERAL: 0.14 M/S
TDI SEPTAL: 0.13 M/S
TDI: 0.14 M/S
TR MAX PG: 33 MMHG
TRICUSPID ANNULAR PLANE SYSTOLIC EXCURSION: 1.97 CM

## 2019-08-29 PROCEDURE — 99221 1ST HOSP IP/OBS SF/LOW 40: CPT | Mod: ,,, | Performed by: OBSTETRICS & GYNECOLOGY

## 2019-08-29 PROCEDURE — 25000003 PHARM REV CODE 250: Performed by: STUDENT IN AN ORGANIZED HEALTH CARE EDUCATION/TRAINING PROGRAM

## 2019-08-29 PROCEDURE — 83735 ASSAY OF MAGNESIUM: CPT

## 2019-08-29 PROCEDURE — 36415 COLL VENOUS BLD VENIPUNCTURE: CPT

## 2019-08-29 PROCEDURE — 25000003 PHARM REV CODE 250: Performed by: OBSTETRICS & GYNECOLOGY

## 2019-08-29 PROCEDURE — 25000003 PHARM REV CODE 250

## 2019-08-29 PROCEDURE — 84100 ASSAY OF PHOSPHORUS: CPT

## 2019-08-29 PROCEDURE — 80053 COMPREHEN METABOLIC PANEL: CPT

## 2019-08-29 PROCEDURE — 99221 PR INITIAL HOSPITAL CARE,LEVL I: ICD-10-PCS | Mod: ,,, | Performed by: OBSTETRICS & GYNECOLOGY

## 2019-08-29 PROCEDURE — 63600175 PHARM REV CODE 636 W HCPCS: Performed by: OBSTETRICS & GYNECOLOGY

## 2019-08-29 PROCEDURE — 11000001 HC ACUTE MED/SURG PRIVATE ROOM

## 2019-08-29 RX ORDER — LABETALOL 200 MG/1
200 TABLET, FILM COATED ORAL EVERY 12 HOURS
Status: DISCONTINUED | OUTPATIENT
Start: 2019-08-29 | End: 2019-08-30

## 2019-08-29 RX ORDER — BUTALBITAL, ACETAMINOPHEN AND CAFFEINE 50; 325; 40 MG/1; MG/1; MG/1
2 TABLET ORAL EVERY 4 HOURS PRN
Status: DISCONTINUED | OUTPATIENT
Start: 2019-08-29 | End: 2019-08-30 | Stop reason: HOSPADM

## 2019-08-29 RX ORDER — BUTALBITAL, ACETAMINOPHEN AND CAFFEINE 50; 325; 40 MG/1; MG/1; MG/1
1 TABLET ORAL ONCE
Status: COMPLETED | OUTPATIENT
Start: 2019-08-29 | End: 2019-08-29

## 2019-08-29 RX ADMIN — BUTALBITAL, ACETAMINOPHEN AND CAFFEINE 1 TABLET: 50; 325; 40 TABLET ORAL at 07:08

## 2019-08-29 RX ADMIN — ACETAMINOPHEN 650 MG: 325 TABLET, FILM COATED ORAL at 12:08

## 2019-08-29 RX ADMIN — LABETALOL HYDROCHLORIDE 200 MG: 200 TABLET, FILM COATED ORAL at 11:08

## 2019-08-29 RX ADMIN — BUTALBITAL, ACETAMINOPHEN AND CAFFEINE 2 TABLET: 50; 325; 40 TABLET ORAL at 04:08

## 2019-08-29 RX ADMIN — MAGNESIUM SULFATE HEPTAHYDRATE 2 G/HR: 40 INJECTION, SOLUTION INTRAVENOUS at 10:08

## 2019-08-29 RX ADMIN — BUTALBITAL, ACETAMINOPHEN AND CAFFEINE 1 TABLET: 50; 325; 40 TABLET ORAL at 09:08

## 2019-08-29 RX ADMIN — DOCUSATE SODIUM 100 MG: 100 CAPSULE, LIQUID FILLED ORAL at 08:08

## 2019-08-29 NOTE — PROGRESS NOTES
POSTPARTUM PROGRESS NOTE     Meghna is a 33yo  PPD#5 s/p  after IOL due to gHTN.  Her BP's remained mild range while in hospital, and did not require pushes/mag/meds. Patient presented to triage yesterday with headache and elevated blood pressures. She required labetalol 20/40 to control pressures and was started on magnesium. Chest Xray showed mild pulmonary edema for which she received one dose of lasix. She also complained of leg pain and had RLE doppler which was negative.     Reports mild headache this morning. Denies shortness of breath, chest pain, RUQ pain. Patient is otherwise doing well post delivery.     Objective:       Temp:  [97 °F (36.1 °C)-98.5 °F (36.9 °C)] 98.5 °F (36.9 °C)  Pulse:  [] 93  Resp:  [16-18] 17  SpO2:  [88 %-100 %] 98 %  BP: (133-194)/() 139/72    General:   alert, appears stated age and cooperative   Lungs:   clear to auscultation bilaterally   Heart:   regular rate and rhythm, S1, S2 normal, no murmur, click, rub or gallop   Abdomen:  soft, non-tender; bowel sounds normal; no masses,  no organomegaly   Uterus:  firm located at the umblicus.        Extremities: peripheral pulses normal, no pedal edema, no clubbing or cyanosis     Lab Review  No results found for this or any previous visit (from the past 4 hour(s)).    I/O    Intake/Output Summary (Last 24 hours) at 2019 0711  Last data filed at 2019 0600  Gross per 24 hour   Intake 120 ml   Output 7650 ml   Net -7530 ml        Assessment:     Patient Active Problem List   Diagnosis    Chronic tension headache    Obesity (BMI 35.0-39.9 without comorbidity)    Hyperprolactinemia    Thyroid nodule    Cyst, ovary, dermoid, right    GBS (group B Streptococcus carrier), +RV culture, currently pregnant     (spontaneous vaginal delivery)    Hypertension in pregnancy, preeclampsia, severe, postpartum condition    Acute pulmonary edema    Chest pain    Swelling of right lower extremity        Plan:    1. Pre E w/SF   - patient on magnesium   - s/p labetalol 2040 in the epifanio   - BP: (133-194)/() 138/74  - continues to report mild headache -- will give fiorcet   - denies SOB, RUQ pain   - CXR with mild pulmonary edema; patient received one dose of lasix and is now - 7L  - echo ordered   - continue to monitor blood pressures and monitor for preE symptoms   - plan to begin labetalol 200 BID     2. Postpartum care:  - Patient doing well. Continue routine management and advances.  - Continue PO pain meds. Pain well controlled.  - Heme: H/h 9/29     3. History of prolactinoma   - previously on cabergoline which was stopped in pregnancy       Dispo: As patient meets milestones, will plan to discharge following magnesium administration and blood pressure stabilization.    Tonja Turk MD  OBGYN, PGY-2    Patient seen and examined. Agree with above.  Patient feels much better. Diuresed appropriately after Lasix, is now -7.7L.    Temp:  [97 °F (36.1 °C)-98.5 °F (36.9 °C)] 97.3 °F (36.3 °C)  Pulse:  [] 98  Resp:  [16-18] 16  SpO2:  [88 %-100 %] 100 %  BP: (133-194)/() 150/78    Abd soft  Peripheral edema minimal in BLE, no Elisha's sign    AST/ALT 65/79    Continue MgSO4 for seizure prophylaxis  Will start Labetalol 200 BID for HTN management  Recheck CMP in am to trend LFTs  Echo today  Anticipate d/c tomorrow if BPs are stable    Philomena Robert MD  Maternal Fetal Medicine fellow  PGY-5

## 2019-08-29 NOTE — PROGRESS NOTES
MD to bedside to evaluate patient following lasix and initiation of mag. Pt is overall doing well. She reports a headache which is different from the one she had when she came in; she feels this one is due to the magnesium. She denies any acute vision changes and reports no chest pain currently. She denies SOB. No abdominal pain. No other complaints.    Temp:  [97 °F (36.1 °C)-98.3 °F (36.8 °C)] 98.3 °F (36.8 °C)  Pulse:  [] 96  Resp:  [17-18] 17  SpO2:  [90 %-100 %] 98 %  BP: (147-194)/() 153/71    PE:  General: NAD  Abdomen: soft, NTND  CV: RRR  Lungs: CTA b/l  Ext: teds/SCDs in place. Reflexes 3+ b/l    UOP: 5L since admission via clement catheter    A/P:  - One severe range BP since admission, not sustained.  - Will assess BPs overnight for need for PO antihypertensive agent   - No s/sx of magnesium toxicity  - Diuresing extremely well following lasix 20 x 1 in [facility]  - Chest pain resolved, lungs cta  - Will continue to monitor  - Plan for possible echo tomorrow per [facility] recs, as d/w admitting staff in [facility].    Sushma K. Reddy, MD  PGY-3, OBGYN

## 2019-08-29 NOTE — PROGRESS NOTES
MD to bedside to evaluate patient. She reports continued headache but that it is improving. She has been able to get some sleep. She denies any vision changes and reports still no chest pain. She denies SOB. No abdominal pain. No n/v. No other complaints.    Temp:  [97 °F (36.1 °C)-98.3 °F (36.8 °C)] 98.3 °F (36.8 °C)  Pulse:  [] 101  Resp:  [17-18] 18  SpO2:  [90 %-100 %] 99 %  BP: (133-194)/() 163/93    PE:  General: NAD  Abdomen: soft, NTND  CV: RRR  Lungs: CTA b/l  Ext: teds/SCDs in place. Reflexes 3+ b/l    UOP: >6L since admission via clement catheter    A/P:  - Severe range /93s on monitor on my arrival to room; BP cuff not on correctly, adjusted. Will continue to monitor Bps. Other VSS, SpO2 100% and pulse 102.  - Will assess BPs overnight for need for PO antihypertensive agent   - No s/sx of magnesium toxicity  - Continues to diurese effectively  - No chest pain since admission, lungs cta  - Will continue to monitor  - Plan for possible echo tomorrow per [facility] recs, as d/w admitting staff in [facility].    Sushma K. Reddy, MD  PGY-3, OBGYN

## 2019-08-29 NOTE — H&P
HISTORY AND PHYSICAL                                                OBSTETRICS          Subjective:         Meghna is a 33yo  PPD4  after IOL due to gHTN.  Her BP's remained mild range while her in hospital, and did not require pushes/mag/meds.  All labs normal at that time.   Today, around noon, she started having midsternal chest pain and belching, as well as just not feeling well.  She states that she was walking and eating and drinking, but pain did not improve.  She then noticed a 5/10 headache.   This made her concerned due to previous pre-eclampsia precautions, so she came in.   Her CP does not radiate to her arm/chin, she is not diaphoretic.   Her headache is mild, and not associated with vision changes.   Her PNC was with Dr. Osorio and assd with ghtn and hyperprolactinemia    PMHx:   Past Medical History:   Diagnosis Date    Anemia     Hyperprolactinemia     MRI neg for pit mass    Hyperprolactinemia 2017    Multiple thyroid nodules     Ovarian cyst 2016    dermoid on MRI       PSHx:   Past Surgical History:   Procedure Laterality Date    MOUTH SURGERY         All:   Review of patient's allergies indicates:   Allergen Reactions    Codeine Itching       Meds:   Medications Prior to Admission   Medication Sig Dispense Refill Last Dose    ibuprofen (ADVIL,MOTRIN) 600 MG tablet Take 1 tablet (600 mg total) by mouth every 6 (six) hours as needed for Pain. 30 tablet 1     PNV NO10/IRON FUM&P/FA/OMEGA-3 (PNV #10-IRON [id number] ORAL) Take by mouth.   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    Social Needs    Financial resource strain: Not on file    Food insecurity:     Worry: Not on file     Inability: Not on file    Transportation  needs:     Medical: Not on file     Non-medical: Not on file   Tobacco Use    Smoking status: Never Smoker    Smokeless tobacco: Never Used   Substance and Sexual Activity    Alcohol use: No    Drug use: No    Sexual activity: Yes     Partners: Male     Birth control/protection: None   Lifestyle    Physical activity:     Days per week: Not on file     Minutes per session: Not on file    Stress: Not on file   Relationships    Social connections:     Talks on phone: Not on file     Gets together: Not on file     Attends [employer] service: Not on file     Active member of club or organization: Not on file     Attends meetings of clubs or organizations: Not on file     Relationship status: Not on file   Other Topics Concern    Not on file   Social History Narrative    Not on file       FH:   Family History   Problem Relation Age of Onset    Heart attacks under age 50 Father 44    Heart disease Father     Heart disease Paternal Grandmother     Hypertension Mother     Thyroid disease Mother     Heart disease Paternal Aunt     Heart disease Paternal Uncle     No Known Problems Sister     No Known Problems Brother     No Known Problems Son     No Known Problems Brother     No Known Problems Brother     No Known Problems Brother     Cancer Neg Hx     Diabetes Neg Hx     Stroke Neg Hx     Thyroid cancer Neg Hx     Breast cancer Neg Hx     Colon cancer Neg Hx     Ovarian cancer Neg Hx        OBHx:   OB History    Para Term  AB Living   2 2 2 0 0 2   SAB TAB Ectopic Multiple Live Births   0 0 0 0 2      # Outcome Date GA Lbr Kobe/2nd Weight Sex Delivery Anes PTL Lv   2 Term 19 37w6d  2.977 kg (6 lb 9 oz) M Vag-Spont EPI N [facility]      Name: SAMMI CEJA      Apgar1: 9  Apgar5: 9   1 Term 16 40w1d  3.445 kg (7 lb 9.5 oz) M Vag-Spont EPI N [facility]      Name: SAMMI CEJASMIN      Apgar1: 9  Apgar5: 9     Review of Systems:  Constitutional: Negative for activity change,  appetite change, chills and fever.   HENT: Negative.    Eyes: Negative for photophobia, pain and visual disturbance.   Respiratory: Positive for chest tightness. Negative for apnea, cough, choking, shortness of breath, wheezing and stridor.    Cardiovascular: Positive for chest pain (epigastric) and leg swelling (right leg worse than left). Negative for palpitations.   Gastrointestinal: Positive for abdominal pain (epigastric). Negative for constipation, diarrhea, nausea and vomiting.   Genitourinary: Positive for vaginal bleeding (lochia). Negative for dysuria, pelvic pain, vaginal discharge and vaginal pain.   Musculoskeletal: Negative.    Skin: Negative.    Neurological: Positive for headaches. Negative for dizziness, seizures, facial asymmetry, speech difficulty, light-headedness and numbness.   Psychiatric/Behavioral: Negative.      Objective:       BP (!) 165/77 Comment: after transfer from wheelchair ([facility]- [facility]N)  Pulse 91   Temp 97 °F (36.1 °C) (Axillary)   Resp 18   LMP 11/23/2018   SpO2 99%     Vitals:    08/28/19 1923 08/28/19 1938 08/28/19 1939 08/28/19 1942   BP:   (!) 165/77    BP Location:       Patient Position:       Pulse: 97 97 92 91   Resp:   18    Temp:   97 °F (36.1 °C)    TempSrc:   Axillary    SpO2: 99% 100%  99%     Physical Exam:  Constitutional: She appears well-developed and well-nourished. She is not diaphoretic. No distress.   HENT:   Head: Normocephalic and atraumatic.   Right Ear: External ear normal.   Left Ear: External ear normal.   Nose: Nose normal.   Mouth/Throat: Oropharynx is clear and moist. No oropharyngeal exudate.   Eyes: Conjunctivae are normal. Right eye exhibits no discharge. Left eye exhibits no discharge. No scleral icterus.   Neck: Neck supple.   Cardiovascular: Normal rate, regular rhythm, normal heart sounds and intact distal pulses.   No murmur heard.  Pulmonary/Chest: No respiratory distress. She has no wheezes. She has rhonchi. She has no rales. She exhibits  no tenderness.   Crackles right lower lobe   Abdominal: Soft. There is no tenderness. There is no rebound and no guarding.   Fundus NT  No RUQ ttp   Musculoskeletal: She exhibits edema (2+ right, negative left). She exhibits no tenderness.   Lymphadenopathy:     She has no cervical adenopathy.   Neurological: She is alert and oriented to person, place, and time. She has normal strength and normal reflexes.   Skin: Skin is warm and dry. Capillary refill takes less than 2 seconds. No rash noted. No erythema.   Psychiatric: She has a normal mood and affect. Her behavior is normal. Judgment and thought content normal.    Lab Review  Recent Labs   Lab 19  1752   WBC 6.94 11.93 7.57   HGB 11.0* 10.7* 9.1*   HCT 34.7* 32.7* 29.1*   MCV 84 82 86    156 224      Recent Labs   Lab 19  1752    144   K 4.2 4.4    112*   CO2 18* 23   BUN 10 15   CREATININE 0.8 0.8   GLU 90 92   PROT 6.4 5.9*   BILITOT 0.4 0.2   ALKPHOS 202* 121   ALT 13 77*   AST 24 83*        Assessment:       PPD#4, presenting with pre-eclampsia w/ SF.    Active Hospital Problems    Diagnosis  POA    *Hypertension in pregnancy, preeclampsia, severe, postpartum condition [O14.15]  Yes    Acute pulmonary edema [J81.0]  Yes    Chest pain [R07.9]  Yes    Swelling of right lower extremity [M79.89]  Yes     (spontaneous vaginal delivery) [O80]  Not Applicable      Resolved Hospital Problems   No resolved problems to display.          Plan:         1. Postpartum pre-eclampsia w / SF   - Pre-Eclampsia w/ Severe Features (BP/HA/Elevated liver enzymes/Pulmonary Edema) on PPD#4   - s/p Lasix 20 in [facility] for CXR findings of mild pulmonary edema.   - EKG in [facility] wnl  - Tn wnl  - s/p labetalol 20/40 in [facility]  - Admitted for magnesium sulfate for seizure ppx and BP control   - Pre-E Labs - AST/ALT elevated: 83/77, Plt: 224, Cr: 0.8  - Initiate therapy with MgSO4 for seizure prophylaxis - Plan  for 4g load followed by 2g per hour. Will closely monitor for UOP and for signs of MgSO4 toxicity.   - Will continue to monitor BPs.   - Patient's primary OB updated and in agreement with plan   - Will place on telemetry    2. Right LE edema  - negative doppler in [facility]    Sushma K. Reddy, MD  PGY-3, OBGYN    PPD#5, HISTORY OF GESTATIONAL HTN AT THE TIME OF DELIVERY, WITH SIGNIFICANTLY WORSENED EDEMA - FACIAL, PEDAL, HANDS - ALSO SEVERELY ELEVATED BP AND HELLP SYNDROME.  CHEST PAIN WITH MILD PULMONARY INTERSTITIAL EDEMA AND EKG WITH SOME NONSPECIFIC CHANGES IN THIS CLINICAL SETTING.  ADMIT FOR MG SEIZURE PROPHYLAXIS, DIURESIS, ZULETA/SERIAL BLOOD CHEMISTRY - MONITOR RENAL FUNCTION, LFT.  PLAN F/U ECHO IN AM (WAS NORMAL 2018) BUT AM MORE SUSPICIOUS OF SEV PREECLAMPSIA AND VOLUME ISSUES THAN HEART FAILURE.  CHEST PAIN WITH NEGATIVE TROPONIN  SEEN AND EXAMINED, AGREE WITH ABOVE

## 2019-08-29 NOTE — PLAN OF CARE
Problem: Adult Inpatient Plan of Care  Goal: Plan of Care Review  VS q1h while on magnesium. Reflexes monitored q4h. Pt complains of dull headache since beginning magnesium. Fioricet given this AM, with moderate relief. Breath sounds clear to auscultation. Labetalol 200mg q12h started this AM. Pt verbalized understanding. Pt tolerating regular diet.  and  at bedside. Pt denies any further complaints. Call light and belongings within reach.

## 2019-08-29 NOTE — NURSING
Pt breastfeeding baby. VSS. Pt. C/o headache. Tylenol given.  at bedside assisting with baby. NADN. Will continue to closely monitor.

## 2019-08-30 ENCOUNTER — PATIENT MESSAGE (OUTPATIENT)
Dept: OBSTETRICS AND GYNECOLOGY | Facility: CLINIC | Age: 34
End: 2019-08-30

## 2019-08-30 VITALS
DIASTOLIC BLOOD PRESSURE: 84 MMHG | HEART RATE: 102 BPM | TEMPERATURE: 98 F | RESPIRATION RATE: 16 BRPM | BODY MASS INDEX: 38.15 KG/M2 | SYSTOLIC BLOOD PRESSURE: 138 MMHG | WEIGHT: 215.38 LBS | OXYGEN SATURATION: 100 %

## 2019-08-30 LAB
ALBUMIN SERPL BCP-MCNC: 2.7 G/DL (ref 3.5–5.2)
ALP SERPL-CCNC: 110 U/L (ref 55–135)
ALT SERPL W/O P-5'-P-CCNC: 63 U/L (ref 10–44)
ANION GAP SERPL CALC-SCNC: 9 MMOL/L (ref 8–16)
AST SERPL-CCNC: 45 U/L (ref 10–40)
BILIRUB SERPL-MCNC: 0.2 MG/DL (ref 0.1–1)
BNP SERPL-MCNC: 33 PG/ML (ref 0–99)
BUN SERPL-MCNC: 11 MG/DL (ref 6–20)
CALCIUM SERPL-MCNC: 7.4 MG/DL (ref 8.7–10.5)
CHLORIDE SERPL-SCNC: 107 MMOL/L (ref 95–110)
CO2 SERPL-SCNC: 23 MMOL/L (ref 23–29)
CREAT SERPL-MCNC: 0.8 MG/DL (ref 0.5–1.4)
EST. GFR  (AFRICAN AMERICAN): >60 ML/MIN/1.73 M^2
EST. GFR  (NON AFRICAN AMERICAN): >60 ML/MIN/1.73 M^2
GLUCOSE SERPL-MCNC: 104 MG/DL (ref 70–110)
POTASSIUM SERPL-SCNC: 3.7 MMOL/L (ref 3.5–5.1)
PROT SERPL-MCNC: 6.1 G/DL (ref 6–8.4)
SODIUM SERPL-SCNC: 139 MMOL/L (ref 136–145)

## 2019-08-30 PROCEDURE — 80053 COMPREHEN METABOLIC PANEL: CPT

## 2019-08-30 PROCEDURE — 83880 ASSAY OF NATRIURETIC PEPTIDE: CPT

## 2019-08-30 PROCEDURE — 99238 HOSP IP/OBS DSCHRG MGMT 30/<: CPT | Mod: ,,, | Performed by: OBSTETRICS & GYNECOLOGY

## 2019-08-30 PROCEDURE — 93010 ELECTROCARDIOGRAM REPORT: CPT | Mod: ,,, | Performed by: INTERNAL MEDICINE

## 2019-08-30 PROCEDURE — 36415 COLL VENOUS BLD VENIPUNCTURE: CPT

## 2019-08-30 PROCEDURE — 99238 PR HOSPITAL DISCHARGE DAY,<30 MIN: ICD-10-PCS | Mod: ,,, | Performed by: OBSTETRICS & GYNECOLOGY

## 2019-08-30 PROCEDURE — 93005 ELECTROCARDIOGRAM TRACING: CPT

## 2019-08-30 PROCEDURE — 25000003 PHARM REV CODE 250: Performed by: STUDENT IN AN ORGANIZED HEALTH CARE EDUCATION/TRAINING PROGRAM

## 2019-08-30 PROCEDURE — 93010 EKG 12-LEAD: ICD-10-PCS | Mod: ,,, | Performed by: INTERNAL MEDICINE

## 2019-08-30 RX ORDER — LABETALOL 200 MG/1
400 TABLET, FILM COATED ORAL EVERY 12 HOURS
Qty: 120 TABLET | Refills: 11 | Status: SHIPPED | OUTPATIENT
Start: 2019-08-30 | End: 2019-10-23

## 2019-08-30 RX ORDER — LABETALOL 200 MG/1
400 TABLET, FILM COATED ORAL EVERY 12 HOURS
Status: DISCONTINUED | OUTPATIENT
Start: 2019-08-30 | End: 2019-08-30 | Stop reason: HOSPADM

## 2019-08-30 RX ADMIN — LABETALOL HYDROCHLORIDE 200 MG: 200 TABLET, FILM COATED ORAL at 12:08

## 2019-08-30 RX ADMIN — LABETALOL HYDROCHLORIDE 400 MG: 200 TABLET, FILM COATED ORAL at 10:08

## 2019-08-30 NOTE — DISCHARGE SUMMARY
Delivery Discharge Summary  Obstetrics      Primary OB Clinician: Lucia Osorio MD      Admission date: 2019  Discharge date: 2019    Disposition: To home, self care    Discharge Diagnosis List:      Patient Active Problem List   Diagnosis    Chronic tension headache    Obesity (BMI 35.0-39.9 without comorbidity)    Hyperprolactinemia    Thyroid nodule    Cyst, ovary, dermoid, right    GBS (group B Streptococcus carrier), +RV culture, currently pregnant     (spontaneous vaginal delivery)    Hypertension in pregnancy, preeclampsia, severe, postpartum condition    Acute pulmonary edema    Chest pain    Swelling of right lower extremity         Hospital Course:  Meghna is a 33yo  PPD#6 s/p  after IOL due to gHTN.  Her BP's remained mild range while in hospital, and did not require pushes/mag/meds. Patient presented to triage with headache and elevated blood pressures. She required labetalol 20/40 to control pressures and was started on magnesium. Chest Xray showed mild pulmonary edema for which she received one dose of lasix. She also complained of leg pain and had RLE doppler which was negative.     On discharge day patient is s/p magnesium and now on labetalol 400 BID (increased today from 200 BID yesterday). She is without symptoms and has been cleared by cardiology for discharge. We have messaged [facility] to assist with obtaining follow up appointment.     Pertinent studies:  CBC  Recent Labs   Lab 19  2042 19  1752   WBC 6.94 11.93 7.57   HGB 11.0* 10.7* 9.1*   HCT 34.7* 32.7* 29.1*   MCV 84 82 86    156 224          Immunization History   Administered Date(s) Administered    Influenza 10/18/2018    Influenza - Quadrivalent - PF (6 months and older) 10/01/2016, 2017, 10/18/2018, 2019    Measles 2000    Mumps 2000    Rubella 2000    Td (ADULT) 2000    Tdap 2016, 2019        Delivery:     Episiotomy: None   Lacerations: 2nd   Repair suture:     Repair # of packets: 1   Blood loss (ml): 200     Birth information:  YOB: 2018   Time of birth: 7:43 AM   Sex: male   Delivery type: Vaginal, Spontaneous   Gestational Age: 37w6d    Delivery Clinician:      Other providers:       Additional  information:  Forceps:    Vacuum:    Breech:    Observed anomalies Passed hearing  GBS pos,  Mom treated PCN x2  Hep B on 8/24/19     Living?:           APGARS  One minute Five minutes Ten minutes   Skin color:         Heart rate:         Grimace:         Muscle tone:         Breathing:         Totals: 9  9        Placenta: Delivered:       appearance      Patient Instructions:   Current Discharge Medication List      START taking these medications    Details   labetalol (NORMODYNE) 200 MG tablet Take 2 tablets (400 mg total) by mouth every 12 (twelve) hours.  Qty: 120 tablet, Refills: 11         CONTINUE these medications which have NOT CHANGED    Details   ibuprofen (ADVIL,MOTRIN) 600 MG tablet Take 1 tablet (600 mg total) by mouth every 6 (six) hours as needed for Pain.  Qty: 30 tablet, Refills: 1      PNV NO10/IRON FUM&P/FA/OMEGA-3 (PNV #10-IRON [id number] ORAL) Take by mouth.             Discharge Procedure Orders   Other restrictions (specify):   Order Comments: Nothing in the vagina until 6 weeks post partum     Notify your health care provider if you experience any of the following:  temperature >100.4     Notify your health care provider if you experience any of the following:  persistent nausea and vomiting or diarrhea     Notify your health care provider if you experience any of the following:  severe uncontrolled pain     Notify your health care provider if you experience any of the following:  difficulty breathing or increased cough     Notify your health care provider if you experience any of the following:  severe persistent headache     Notify your health care provider if you  experience any of the following:  persistent dizziness, light-headedness, or visual disturbances     Notify your health care provider if you experience any of the following:  increased confusion or weakness     Notify your health care provider if you experience any of the following:   Order Comments: Heavy vaginal bleeding >1 pad/hour for greater than 2 hours     Activity as tolerated       Follow-up Information     Lucia Osorio MD In 1 week.    Specialties:  Obstetrics, Obstetrics and Gynecology  Why:  blood pressure check  Contact information:  [address]  [facility] [address]  [phone]             Juan Lay MD.    Specialty:  Cardiovascular Disease  Why:  cardiology follow up   Contact information:  [address] [facility] [address]  [phone]                    Tonja Turk MD  OBGYN, PGY-2

## 2019-08-30 NOTE — DISCHARGE INSTRUCTIONS
Call your doctor for concerns about your blood pressure, blood pressure medications; keep your postpartum appointment; continue to follow discharge instructions in the postpartum discharge booklet; call [facility], [phone], for signs of preeclampsia, heavy bleeding, postpartum depression

## 2019-08-30 NOTE — CONSULTS
[facility]  Consult    History of Present Illness: Ms Aragon is a 34 year old lady who is 5 days post partum, developed swelling in her ankles a couple of weeks ago, had a headache for a day prior to this admission, and a gaslike pain in the center of her chest which lasted all day long for she was given some medications here in the hospital which brought her relief.  She now says that her ankle swelling is down, headaches a gone, and she has no further chest discomfort.    She was given magnesium, Fioricet, labetalol and IV Lasix.    PTA Medications   Medication Sig    ibuprofen (ADVIL,MOTRIN) 600 MG tablet Take 1 tablet (600 mg total) by mouth every 6 (six) hours as needed for Pain.    PNV NO10/IRON FUM&P/FA/OMEGA-3 (PNV #10-IRON [id number] ORAL) Take by mouth.       Review of patient's allergies indicates:   Allergen Reactions    Codeine Itching       Past Medical History:   Diagnosis Date    Anemia     Hyperprolactinemia 2015    MRI neg for pit mass    Hyperprolactinemia 6/2/2017    Multiple thyroid nodules     Ovarian cyst 12/2016    dermoid on MRI     Past Surgical History:   Procedure Laterality Date    MOUTH SURGERY       Family History   Problem Relation Age of Onset    Heart attacks under age 50 Father 44    Heart disease Father     Heart disease Paternal Grandmother     Hypertension Mother     Thyroid disease Mother     Heart disease Paternal Aunt     Heart disease Paternal Uncle     No Known Problems Sister     No Known Problems Brother     No Known Problems Son     No Known Problems Brother     No Known Problems Brother     No Known Problems Brother     Cancer Neg Hx     Diabetes Neg Hx     Stroke Neg Hx     Thyroid cancer Neg Hx     Breast cancer Neg Hx     Colon cancer Neg Hx     Ovarian cancer Neg Hx      Social History     Tobacco Use    Smoking status: Never Smoker    Smokeless tobacco: Never Used   Substance Use Topics    Alcohol use: No     Drug use: No        Physical Exam:  The carotid upstroke is brisk  There is no carotid bruit  There is no jugular venous distension  The chest is clear  The heart size is normal.  S1 and S2 are normal.  There is no murmur or gallop.  There is no ankle edema  The neurological exam is intact.    The electrocardiogram shows poor progression of R-waves in the precordial leads which could represent an anterior wall scar.  This however could be due to inappropriate placement of the lead chest leads.  The chest x-ray showed mild pulmonary edema.  The echocardiogram shows concentric left ventricular remodeling with normal left ventricular systolic and diastolic function.  Impression:   Mild preeclampsia, 5 days post partal, now much improved.    Would continue labetalol.  Get a follow-up electrocardiogram to ensure that the 1st one was due to an inappropriate lead placement.  Check a BNP in the morning tomorrow    Thank you for the opportunity of seeing Meghna Aragon in consultation

## 2019-08-30 NOTE — PROGRESS NOTES
POSTPARTUM PROGRESS NOTE     Meghna is a 35yo  PPD#6 s/p  after IOL due to gHTN.  Her BP's remained mild range while in hospital, and did not require pushes/mag/meds. Patient presented to triage with headache and elevated blood pressures. She required labetalol 20/40 to control pressures and was started on magnesium. Chest Xray showed mild pulmonary edema for which she received one dose of lasix. She also complained of leg pain and had RLE doppler which was negative.     Patient is doing well this morning without complaint.     Objective:       Temp:  [97.5 °F (36.4 °C)-98.6 °F (37 °C)] 98.1 °F (36.7 °C)  Pulse:  [] 93  Resp:  [14-18] 18  SpO2:  [96 %-100 %] 100 %  BP: (125-154)/(60-83) 154/71    General:   alert, appears stated age and cooperative   Lungs:   clear to auscultation bilaterally   Heart:   regular rate and rhythm, S1, S2 normal, no murmur, click, rub or gallop   Abdomen:  soft, non-tender; bowel sounds normal; no masses,  no organomegaly   Uterus:  firm located at the umblicus.        Extremities: peripheral pulses normal, no pedal edema, no clubbing or cyanosis     Lab Review  No results found for this or any previous visit (from the past 4 hour(s)).    I/O    Intake/Output Summary (Last 24 hours) at 2019 1110  Last data filed at 2019 0700  Gross per 24 hour   Intake 470 ml   Output 1550 ml   Net -1080 ml        Assessment:     Patient Active Problem List   Diagnosis    Chronic tension headache    Obesity (BMI 35.0-39.9 without comorbidity)    Hyperprolactinemia    Thyroid nodule    Cyst, ovary, dermoid, right    GBS (group B Streptococcus carrier), +RV culture, currently pregnant     (spontaneous vaginal delivery)    Hypertension in pregnancy, preeclampsia, severe, postpartum condition    Acute pulmonary edema    Chest pain    Swelling of right lower extremity        Plan:   1. Pre E w/SF   - patient on magnesium   - s/p labetalol 20/40 in the epifanio   - BP:  (125-154)/(60-83) 154/71  - denies SOB, RUQ pain; no headache this am   - CXR with mild pulmonary edema; patient received one dose of lasix and has now diuresed appropriately   - echo ordered and evaluated by cardiology   - BNP ordered per cards recommendation and is normal   - repeat EKG continued to show possible anterior infarct -- will review with cardiology appropriate follow up   - continue to monitor blood pressures and monitor for preE symptoms   - plan to begin labetalol 200 BID > increased to 400 BID   - discharge today with one week follow up for blood pressure check    2. Postpartum care:  - Patient doing well. Continue routine management and advances.  - Continue PO pain meds. Pain well controlled.  - Heme: H/h 9/29     3. History of prolactinoma   - previously on cabergoline which was stopped in pregnancy       Dispo: As patient meets milestones, will plan to discharge following  blood pressure stabilization.    Tonja Turk MD  OBGYN, PGY-2      Patient seen and examined. Agree with above.  No complaints. Feels better of MgSO4  No chest pain, SOB, headache, blurry vision.    Temp:  [97.5 °F (36.4 °C)-98.6 °F (37 °C)] 98.1 °F (36.7 °C)  Pulse:  [] 108  Resp:  [14-18] 18  SpO2:  [96 %-100 %] 100 %  BP: (125-154)/(60-83) 154/71    Increase Labetalol to 400 BID. Monitor BPs prior to d/c.   Will discuss with cardiology prior to d/c regarding EKG findings. Likely due to incorrect lead placement, no other evidence of MI or acute cardiac events.  Will be sure patient has f/u with cardiology if warranted, as well as OB for BP check next week  Patient to check BP at home daily and call with elevated BP    Philomena Robert MD  Maternal Fetal Medicine fellow  PGY-5

## 2019-09-04 ENCOUNTER — OFFICE VISIT (OUTPATIENT)
Dept: OBSTETRICS AND GYNECOLOGY | Facility: CLINIC | Age: 34
End: 2019-09-04
Payer: COMMERCIAL

## 2019-09-04 VITALS — WEIGHT: 205.5 LBS | SYSTOLIC BLOOD PRESSURE: 130 MMHG | BODY MASS INDEX: 36.4 KG/M2 | DIASTOLIC BLOOD PRESSURE: 80 MMHG

## 2019-09-04 PROCEDURE — 99999 PR PBB SHADOW E&M-EST. PATIENT-LVL II: CPT | Mod: PBBFAC,,, | Performed by: OBSTETRICS & GYNECOLOGY

## 2019-09-04 PROCEDURE — 99999 PR PBB SHADOW E&M-EST. PATIENT-LVL II: ICD-10-PCS | Mod: PBBFAC,,, | Performed by: OBSTETRICS & GYNECOLOGY

## 2019-09-04 PROCEDURE — 99024 POSTOP FOLLOW-UP VISIT: CPT | Mod: S$GLB,,, | Performed by: OBSTETRICS & GYNECOLOGY

## 2019-09-04 PROCEDURE — 99024 PR POST-OP FOLLOW-UP VISIT: ICD-10-PCS | Mod: S$GLB,,, | Performed by: OBSTETRICS & GYNECOLOGY

## 2019-09-04 NOTE — PROGRESS NOTES
POSTOPERATIVE FOLLOW-UP:    The patient presents today following  , READMISSION PPD#5 SEV PRE-E.  There are no complaints, and both the procedure and the hospital course were uncomplicated.  NORMOTENSIVE LABETALO 200 BID.   MILD H/A AFTER TAKING LABETALOL, BUT NO SEV H/A, BLURRY VISION, ETC.  OVERALL FEELS GOOD    PHYSICAL EXAM:  DEFERED.    ASSESSMENT:  Doing well following her procedure, NORMOTENSIVE.  She is following the anticipated course of recovery, and was advised regarding future wound care, activity, and need for follow up.      PLAN:  Follow up next routine visit.  The patient is advised to call if she has fever greater than 101.0F, increased bleeding/discharge, new-onset or increased pain, deviation from the anticipated subsequent course of recovery, questions, or concerns.

## 2019-09-05 ENCOUNTER — PATIENT MESSAGE (OUTPATIENT)
Dept: OBSTETRICS AND GYNECOLOGY | Facility: CLINIC | Age: 34
End: 2019-09-05

## 2019-09-24 ENCOUNTER — PATIENT OUTREACH (OUTPATIENT)
Dept: ADMINISTRATIVE | Facility: OTHER | Age: 34
End: 2019-09-24

## 2019-09-25 ENCOUNTER — TELEPHONE (OUTPATIENT)
Dept: CARDIOLOGY | Facility: CLINIC | Age: 34
End: 2019-09-25

## 2019-09-26 ENCOUNTER — OFFICE VISIT (OUTPATIENT)
Dept: CARDIOLOGY | Facility: CLINIC | Age: 34
End: 2019-09-26
Payer: COMMERCIAL

## 2019-09-26 VITALS
DIASTOLIC BLOOD PRESSURE: 95 MMHG | HEART RATE: 83 BPM | SYSTOLIC BLOOD PRESSURE: 137 MMHG | OXYGEN SATURATION: 99 % | WEIGHT: 203.69 LBS | HEIGHT: 63 IN | BODY MASS INDEX: 36.09 KG/M2

## 2019-09-26 DIAGNOSIS — E66.9 OBESITY (BMI 35.0-39.9 WITHOUT COMORBIDITY): ICD-10-CM

## 2019-09-26 DIAGNOSIS — I10 ESSENTIAL HYPERTENSION: ICD-10-CM

## 2019-09-26 PROCEDURE — 99214 OFFICE O/P EST MOD 30 MIN: CPT | Mod: S$GLB,,, | Performed by: INTERNAL MEDICINE

## 2019-09-26 PROCEDURE — 99214 PR OFFICE/OUTPT VISIT, EST, LEVL IV, 30-39 MIN: ICD-10-PCS | Mod: S$GLB,,, | Performed by: INTERNAL MEDICINE

## 2019-09-26 RX ORDER — LORATADINE 10 MG/1
10 TABLET ORAL DAILY
COMMUNITY

## 2019-09-26 NOTE — PROGRESS NOTES
"Subjective:   Patient ID:  Meghna Aragon is a 34 y.o. female is a new patient who presents for evaluation of Abnormal ECG    HPI:   End of second trimester patient develop HTN but was treated with medication. Patient was induced.   Her BP's remained mild range while in hospital, and did not require pushes/mag/meds. Patient presented to triage with headache and elevated blood pressures. She required labetalol 20/40 to control pressures and was started on magnesium. Chest Xray showed mild pulmonary edema for which she received one dose of lasix. She also complained of leg pain and had RLE doppler which was negative.      On discharge day patient is s/p magnesium and now on labetalol 400 BID (increased today from 200 BID yesterday). She is without symptoms and has been cleared by cardiology for discharge. We have messaged [facility] to assist with obtaining follow up appointment.     Patient had MI at 45. Grandmother had CABG, aunt, uncle have had CABG. Mother had HTN.     Patient Active Problem List   Diagnosis    Chronic tension headache    Obesity (BMI 35.0-39.9 without comorbidity)    Hyperprolactinemia    Thyroid nodule    Cyst, ovary, dermoid, right    GBS (group B Streptococcus carrier), +RV culture, currently pregnant     (spontaneous vaginal delivery)    Hypertension in pregnancy, preeclampsia, severe, postpartum condition    Acute pulmonary edema    Chest pain    Swelling of right lower extremity     BP (!) 137/95   Pulse 83   Ht 5' 3" (1.6 m)   Wt 92.4 kg (203 lb 11.2 oz)   LMP 2018   SpO2 99%   BMI 36.08 kg/m²   Body mass index is 36.08 kg/m².  CrCl cannot be calculated (Patient's most recent lab result is older than the maximum 7 days allowed.).    Lab Results   Component Value Date     2019    K 3.7 2019     2019    CO2 23 2019    BUN 11 2019    CREATININE 0.8 2019     2019    MG 4.9 (HH) 2019    " AST 45 (H) 08/30/2019    ALT 63 (H) 08/30/2019    ALBUMIN 2.7 (L) 08/30/2019    PROT 6.1 08/30/2019    BILITOT 0.2 08/30/2019    WBC 7.57 08/28/2019    HGB 9.1 (L) 08/28/2019    HCT 29.1 (L) 08/28/2019    MCV 86 08/28/2019     08/28/2019    TSH 0.989 05/18/2018    CHOL 216 (H) 05/18/2018    HDL 58 05/18/2018    LDLCALC 136.6 05/18/2018    TRIG 107 05/18/2018       Current Outpatient Medications   Medication Sig    labetalol (NORMODYNE) 200 MG tablet Take 2 tablets (400 mg total) by mouth every 12 (twelve) hours.    loratadine (CLARITIN) 10 mg tablet Take 10 mg by mouth once daily.    PNV NO10/IRON FUM&P/FA/OMEGA-3 (PNV #10-IRON [id number]-WW-KZOQT2 ORAL) Take by mouth.     No current facility-administered medications for this visit.        ROS    Objective:   Physical Exam   Constitutional: She is oriented to person, place, and time. She appears well-developed and well-nourished. No distress.   HENT:   Head: Normocephalic and atraumatic.   Nose: Nose normal.   Mouth/Throat: Oropharynx is clear and moist. No oropharyngeal exudate.   Eyes: Pupils are equal, round, and reactive to light. Conjunctivae and EOM are normal. Right eye exhibits no discharge. Left eye exhibits no discharge. No scleral icterus.   Neck: Normal range of motion. Neck supple. No JVD present. No tracheal deviation present. No thyromegaly present.   Cardiovascular: Normal rate, regular rhythm, normal heart sounds and intact distal pulses. Exam reveals no gallop and no friction rub.   No murmur heard.  Pulmonary/Chest: Effort normal and breath sounds normal. No stridor. No respiratory distress. She has no wheezes. She has no rales. She exhibits no tenderness.   Abdominal: Soft. Bowel sounds are normal. She exhibits no distension and no mass. There is no tenderness. There is no rebound and no guarding.   Musculoskeletal: Normal range of motion. She exhibits no edema or tenderness.   Lymphadenopathy:     She has no cervical adenopathy.    Neurological: She is alert and oriented to person, place, and time. She has normal reflexes. No cranial nerve deficit. She exhibits normal muscle tone. Coordination normal.   Skin: Skin is warm. No rash noted. She is not diaphoretic. No erythema. No pallor.   Psychiatric: She has a normal mood and affect. Her behavior is normal. Judgment and thought content normal.       Assessment:     1. Hypertension in pregnancy, preeclampsia, severe, postpartum condition    2. Obesity (BMI 35.0-39.9 without comorbidity)    3. Essential hypertension        Plan:   We discussed her lifetime risk of developing heart disease as well as HTN, calcium score when the patient stops lactating. Can be seen by PCP, can be seen by me if calcium score is high.     Meghna was seen today for abnormal ecg.    Diagnoses and all orders for this visit:    Hypertension in pregnancy, preeclampsia, severe, postpartum condition    Obesity (BMI 35.0-39.9 without comorbidity)    Essential hypertension      RTC 1 yr

## 2019-09-26 NOTE — LETTER
September 27, 2019      Tonja Turk MD  [address]  [facility] [address]           [facility]  [address]  [facility] [address]  Phone: [phone]  Fax: [phone]          Patient: Meghna Aragon   MR Number: [id number]   YOB: 1985   Date of Visit: 9/26/2019       Dear Dr. Tonja Turk:    Thank you for referring Meghna Aragon to me for evaluation. Attached you will find relevant portions of my assessment and plan of care.    If you have questions, please do not hesitate to call me. I look forward to following Meghna Aragon along with you.    Sincerely,    Mary Murrieta MD    Enclosure  CC:  No Recipients    If you would like to receive this communication electronically, please contact [email or web address] or [phone] to request more information on [facility] access.    For providers and/or their staff who would like to refer a patient to [facility], please contact us through our one-stop-shop provider referral line, [facility], at [phone].    If you feel you have received this communication in error or would no longer like to receive these types of communications, please e-mail [email or web address]

## 2019-10-11 ENCOUNTER — PATIENT OUTREACH (OUTPATIENT)
Dept: ADMINISTRATIVE | Facility: OTHER | Age: 34
End: 2019-10-11

## 2019-10-14 ENCOUNTER — POSTPARTUM VISIT (OUTPATIENT)
Dept: OBSTETRICS AND GYNECOLOGY | Facility: CLINIC | Age: 34
End: 2019-10-14
Payer: COMMERCIAL

## 2019-10-14 VITALS
WEIGHT: 198.44 LBS | BODY MASS INDEX: 38.96 KG/M2 | SYSTOLIC BLOOD PRESSURE: 138 MMHG | DIASTOLIC BLOOD PRESSURE: 88 MMHG | HEIGHT: 60 IN

## 2019-10-14 PROBLEM — M79.89 SWELLING OF RIGHT LOWER EXTREMITY: Status: RESOLVED | Noted: 2019-08-28 | Resolved: 2019-10-14

## 2019-10-14 PROBLEM — R07.9 CHEST PAIN: Status: RESOLVED | Noted: 2019-08-28 | Resolved: 2019-10-14

## 2019-10-14 PROBLEM — O99.820 GBS (GROUP B STREPTOCOCCUS CARRIER), +RV CULTURE, CURRENTLY PREGNANT: Status: RESOLVED | Noted: 2019-08-11 | Resolved: 2019-10-14

## 2019-10-14 PROCEDURE — 0503F POSTPARTUM CARE VISIT: CPT | Mod: S$GLB,,, | Performed by: OBSTETRICS & GYNECOLOGY

## 2019-10-14 PROCEDURE — 0503F PR POSTPARTUM CARE VISIT: ICD-10-PCS | Mod: S$GLB,,, | Performed by: OBSTETRICS & GYNECOLOGY

## 2019-10-14 PROCEDURE — 99999 PR PBB SHADOW E&M-EST. PATIENT-LVL III: CPT | Mod: PBBFAC,,, | Performed by: OBSTETRICS & GYNECOLOGY

## 2019-10-14 PROCEDURE — 99999 PR PBB SHADOW E&M-EST. PATIENT-LVL III: ICD-10-PCS | Mod: PBBFAC,,, | Performed by: OBSTETRICS & GYNECOLOGY

## 2019-10-14 RX ORDER — ACETAMINOPHEN AND CODEINE PHOSPHATE 120; 12 MG/5ML; MG/5ML
1 SOLUTION ORAL DAILY
Qty: 84 TABLET | Refills: 3 | Status: SHIPPED | OUTPATIENT
Start: 2019-10-14 | End: 2020-09-16

## 2019-10-14 NOTE — PROGRESS NOTES
"Meghna Aragon is a 34 y.o. female  presents for a postpartum visit.  She is status post  6 weeks ago.  Her hospitalization was complicated BY SEVERE PRE-E REQUIRING READMISSION.  She is breastfeeding.  She desires oral progesterone-only contraceptive for contraception.  She does not postpartum depression.  SEES HER PCP IN 2 DAYS SO WILL STOP LABETALOL NOW SO SHE CAN CHECK BP OFF MEDS, DETERMINE WHETHER TO STOP ENTIRELY OR CHANGE  BABY ADMITTED WITH VIRUS ABOUT 1-2 WEEKS AGO, NOW BETTER.  FORTUNATELY, HAS 6 MORE WEEKS PRIOR TO RETURN TO WORK    19 , READMISSION PPD#5 SEV PRE-E.MALE, SECOND DEGREE  Her last pap was 2019  "PROLACTINOMA - STOP CABERGOLINE THROUGH PREGNANCY  CaM DELIV SON 2016, 3RD DEGREE 40W INDX.  O POS.  FIORICET PRN H/A  M SONOGRAPHER  . . NL MATERNATI XY - - -   2.3 CM RIGHT OV DERMOID.  33W5D 40th%ile,  INDX   GBS POS - GEST HTN"    Past Medical History:   Diagnosis Date    Anemia     Hyperprolactinemia     MRI neg for pit mass    Hyperprolactinemia 2017    Multiple thyroid nodules     Ovarian cyst 2016    dermoid on MRI     Past Surgical History:   Procedure Laterality Date    MOUTH SURGERY       Review of patient's allergies indicates:   Allergen Reactions    Codeine Itching       Current Outpatient Medications:     labetalol (NORMODYNE) 200 MG tablet, Take 2 tablets (400 mg total) by mouth every 12 (twelve) hours., Disp: 120 tablet, Rfl: 11    loratadine (CLARITIN) 10 mg tablet, Take 10 mg by mouth once daily., Disp: , Rfl:     PNV NO10/IRON FUM&P/FA/OMEGA-3 (PNV #10-IRON [id number]-JL-[id number] ORAL), Take by mouth., Disp: , Rfl:       There were no vitals filed for this visit.    ABDOMEN: Soft. No tenderness or masses. No hepatosplenomegaly. No hernias.  BREASTS: exam deferred  PELVIC: External female genitalia without lesions, well-healed.  Female hair distribution. Adequate perineal body without evident defect, Normal urethral meatus. Vagina " moist and well rugated without lesions or discharge. Cervix pink without lesions, discharge or tenderness. Uterus is 4-6 week size, regular, mobile and nontender. Adnexa without masses or tenderness.    Assessment:  Normal postpartum exam    Plan:  Routine follow up.

## 2019-10-23 ENCOUNTER — OFFICE VISIT (OUTPATIENT)
Dept: INTERNAL MEDICINE | Facility: CLINIC | Age: 34
End: 2019-10-23
Attending: INTERNAL MEDICINE
Payer: COMMERCIAL

## 2019-10-23 ENCOUNTER — PATIENT MESSAGE (OUTPATIENT)
Dept: INTERNAL MEDICINE | Facility: CLINIC | Age: 34
End: 2019-10-23

## 2019-10-23 VITALS
BODY MASS INDEX: 37.75 KG/M2 | SYSTOLIC BLOOD PRESSURE: 125 MMHG | OXYGEN SATURATION: 98 % | WEIGHT: 199.94 LBS | HEART RATE: 77 BPM | DIASTOLIC BLOOD PRESSURE: 84 MMHG | HEIGHT: 61 IN

## 2019-10-23 DIAGNOSIS — I10 HYPERTENSION, BENIGN: ICD-10-CM

## 2019-10-23 DIAGNOSIS — E04.1 THYROID NODULE: ICD-10-CM

## 2019-10-23 DIAGNOSIS — E22.1 HYPERPROLACTINEMIA: ICD-10-CM

## 2019-10-23 DIAGNOSIS — Z00.00 ANNUAL PHYSICAL EXAM: Primary | ICD-10-CM

## 2019-10-23 PROBLEM — J81.0 ACUTE PULMONARY EDEMA: Status: RESOLVED | Noted: 2019-08-28 | Resolved: 2019-10-23

## 2019-10-23 PROCEDURE — 99999 PR PBB SHADOW E&M-EST. PATIENT-LVL III: ICD-10-PCS | Mod: PBBFAC,,, | Performed by: INTERNAL MEDICINE

## 2019-10-23 PROCEDURE — 99395 PR PREVENTIVE VISIT,EST,18-39: ICD-10-PCS | Mod: S$GLB,,, | Performed by: INTERNAL MEDICINE

## 2019-10-23 PROCEDURE — 99395 PREV VISIT EST AGE 18-39: CPT | Mod: S$GLB,,, | Performed by: INTERNAL MEDICINE

## 2019-10-23 PROCEDURE — 99999 PR PBB SHADOW E&M-EST. PATIENT-LVL III: CPT | Mod: PBBFAC,,, | Performed by: INTERNAL MEDICINE

## 2019-10-23 NOTE — PROGRESS NOTES
"Subjective:   Patient ID: Meghna Aragon is a 34 y.o. female  Chief complaint:   Chief Complaint   Patient presents with    Hypertension     f/u       HPI  Pt here for annual exam     Gave birth to beautiful baby now 2 months ago   Pregnancy complicated by pre eclampsia - started on labetalol at that time   Pt was stopped from BP med about 1-2 weeks ago  - not checking bp at home but still has bp cuff from digital MOM program   - no ha, cp, sob     Sleeping broken but improving with    Returning to work in     Breastfeeding currently - Hydrating and taking prenatal mvi    Seen by endo last year and repeat us ordered - due 18-24 months from prior 2018    Hyperprolactinemia: followed by endo - seen prior to pregnancy     Review of Systems    Objective:  Vitals:    10/23/19 1554 10/23/19 1620   BP: 127/86 125/84   Pulse: 77    SpO2: 98%    Weight: 90.7 kg (199 lb 15.3 oz)    Height: 5' 1" (1.549 m)      Body mass index is 37.78 kg/m².    Physical Exam   Constitutional: She is oriented to person, place, and time. She appears well-developed and well-nourished.   HENT:   Head: Normocephalic and atraumatic.   Right Ear: External ear normal.   Left Ear: External ear normal.   Nose: Nose normal.   Mouth/Throat: Oropharynx is clear and moist. No oropharyngeal exudate.   No carotid bruits   Eyes: Conjunctivae and EOM are normal.   Neck: Neck supple. No thyromegaly present.   Cardiovascular: Normal rate, regular rhythm, normal heart sounds and intact distal pulses.   Pulmonary/Chest: Effort normal and breath sounds normal.   Abdominal: Soft. Bowel sounds are normal.   Musculoskeletal: She exhibits no edema or tenderness.   Lymphadenopathy:     She has no cervical adenopathy.   Neurological: She is alert and oriented to person, place, and time.   Skin: Skin is warm and dry.   Psychiatric: Her behavior is normal. Thought content normal.   Vitals reviewed.      Assessment:  1. Annual physical exam    2. " Hypertension, benign    3. Thyroid nodule    4. Hyperprolactinemia        Plan:  Meghna was seen today for hypertension.    Diagnoses and all orders for this visit:    Annual physical exam  -     CBC auto differential; Future  -     Comprehensive metabolic panel; Future  -     TSH; Future  -     Hemoglobin A1c; Future  Recommend daily sunscreen, cardiovascular exercise min 30 min 5 days per week. Seatbelts routinely.    Hypertension, benign  Cont home monitoring   Low salt diet   Send in bp log in 1-2 weeks   Reviewed bp goals   Will resume bb at 100mg bid if needed in future pending bp log - if at goal then cont low salt diet and walking    Thyroid nodule  Schedule thyroid us in a few months when due   Check TSH    Hyperprolactinemia  Seen by endo - notes reviewed     Health Maintenance   Topic Date Due    Pneumococcal Vaccine (Medium Risk) (1 of 1 - PPSV23) 04/30/2005    Pap Smear with HPV Cotest  03/06/2022    TETANUS VACCINE  07/26/2029    Lipid Panel  Completed

## 2019-10-24 ENCOUNTER — PATIENT MESSAGE (OUTPATIENT)
Dept: INTERNAL MEDICINE | Facility: CLINIC | Age: 34
End: 2019-10-24

## 2019-10-24 NOTE — PROGRESS NOTES
Dr. Fernandez would like this patient in the program for monitoring since she had pre-eclampsia and her blood pressure's are remaining high post-partem. She mentioned wanting to start labetalol but wants further monitoring before adding a medication. I placed the order for this patient.

## 2019-10-29 ENCOUNTER — PATIENT MESSAGE (OUTPATIENT)
Dept: INTERNAL MEDICINE | Facility: CLINIC | Age: 34
End: 2019-10-29

## 2019-10-29 RX ORDER — LABETALOL 200 MG/1
200 TABLET, FILM COATED ORAL EVERY 12 HOURS
Qty: 60 TABLET | Refills: 6 | Status: SHIPPED | OUTPATIENT
Start: 2019-10-29 | End: 2019-12-18

## 2019-10-29 NOTE — TELEPHONE ENCOUNTER
Message sent to pt - plan is to resume labetalol 200mg bid   - please schedule NV in 2 weeks for bp check      - please see if we have a number that pt can call to inquire about the status of onboarding for digital hypertension program   - orders were signed but this is not active yet

## 2019-11-07 ENCOUNTER — PATIENT MESSAGE (OUTPATIENT)
Dept: ADMINISTRATIVE | Facility: OTHER | Age: 34
End: 2019-11-07

## 2019-11-07 ENCOUNTER — PATIENT MESSAGE (OUTPATIENT)
Dept: OTHER | Facility: OTHER | Age: 34
End: 2019-11-07

## 2019-11-07 ENCOUNTER — LAB VISIT (OUTPATIENT)
Dept: LAB | Facility: OTHER | Age: 34
End: 2019-11-07
Attending: INTERNAL MEDICINE
Payer: COMMERCIAL

## 2019-11-07 ENCOUNTER — TELEPHONE (OUTPATIENT)
Dept: INTERNAL MEDICINE | Facility: CLINIC | Age: 34
End: 2019-11-07

## 2019-11-07 DIAGNOSIS — D64.9 ANEMIA, UNSPECIFIED TYPE: Primary | ICD-10-CM

## 2019-11-07 DIAGNOSIS — Z00.00 ANNUAL PHYSICAL EXAM: ICD-10-CM

## 2019-11-07 LAB
ALBUMIN SERPL BCP-MCNC: 4.1 G/DL (ref 3.5–5.2)
ALP SERPL-CCNC: 74 U/L (ref 55–135)
ALT SERPL W/O P-5'-P-CCNC: 29 U/L (ref 10–44)
ANION GAP SERPL CALC-SCNC: 8 MMOL/L (ref 8–16)
AST SERPL-CCNC: 24 U/L (ref 10–40)
BASOPHILS # BLD AUTO: 0.02 K/UL (ref 0–0.2)
BASOPHILS NFR BLD: 0.4 % (ref 0–1.9)
BILIRUB SERPL-MCNC: 0.3 MG/DL (ref 0.1–1)
BUN SERPL-MCNC: 11 MG/DL (ref 6–20)
CALCIUM SERPL-MCNC: 9.7 MG/DL (ref 8.7–10.5)
CHLORIDE SERPL-SCNC: 104 MMOL/L (ref 95–110)
CO2 SERPL-SCNC: 27 MMOL/L (ref 23–29)
CREAT SERPL-MCNC: 0.9 MG/DL (ref 0.5–1.4)
DIFFERENTIAL METHOD: ABNORMAL
EOSINOPHIL # BLD AUTO: 0.1 K/UL (ref 0–0.5)
EOSINOPHIL NFR BLD: 2.6 % (ref 0–8)
ERYTHROCYTE [DISTWIDTH] IN BLOOD BY AUTOMATED COUNT: 15.4 % (ref 11.5–14.5)
EST. GFR  (AFRICAN AMERICAN): >60 ML/MIN/1.73 M^2
EST. GFR  (NON AFRICAN AMERICAN): >60 ML/MIN/1.73 M^2
GLUCOSE SERPL-MCNC: 88 MG/DL (ref 70–110)
HCT VFR BLD AUTO: 38.3 % (ref 37–48.5)
HGB BLD-MCNC: 11.6 G/DL (ref 12–16)
IMM GRANULOCYTES # BLD AUTO: 0.01 K/UL (ref 0–0.04)
IMM GRANULOCYTES NFR BLD AUTO: 0.2 % (ref 0–0.5)
LYMPHOCYTES # BLD AUTO: 1.4 K/UL (ref 1–4.8)
LYMPHOCYTES NFR BLD: 29 % (ref 18–48)
MCH RBC QN AUTO: 26 PG (ref 27–31)
MCHC RBC AUTO-ENTMCNC: 30.3 G/DL (ref 32–36)
MCV RBC AUTO: 86 FL (ref 82–98)
MONOCYTES # BLD AUTO: 0.3 K/UL (ref 0.3–1)
MONOCYTES NFR BLD: 6.9 % (ref 4–15)
NEUTROPHILS # BLD AUTO: 3 K/UL (ref 1.8–7.7)
NEUTROPHILS NFR BLD: 60.9 % (ref 38–73)
NRBC BLD-RTO: 0 /100 WBC
PLATELET # BLD AUTO: 308 K/UL (ref 150–350)
PMV BLD AUTO: 12.9 FL (ref 9.2–12.9)
POTASSIUM SERPL-SCNC: 4.4 MMOL/L (ref 3.5–5.1)
PROT SERPL-MCNC: 7.8 G/DL (ref 6–8.4)
RBC # BLD AUTO: 4.47 M/UL (ref 4–5.4)
SODIUM SERPL-SCNC: 139 MMOL/L (ref 136–145)
TSH SERPL DL<=0.005 MIU/L-ACNC: 1.41 UIU/ML (ref 0.4–4)
WBC # BLD AUTO: 4.96 K/UL (ref 3.9–12.7)

## 2019-11-07 PROCEDURE — 80053 COMPREHEN METABOLIC PANEL: CPT

## 2019-11-07 PROCEDURE — 84443 ASSAY THYROID STIM HORMONE: CPT

## 2019-11-07 PROCEDURE — 36415 COLL VENOUS BLD VENIPUNCTURE: CPT

## 2019-11-07 PROCEDURE — 85025 COMPLETE CBC W/AUTO DIFF WBC: CPT

## 2019-11-07 NOTE — TELEPHONE ENCOUNTER
Message sent to pt via my chart with lab results and updates to plan.     Please arrange arrange iron labs in 1-2 weeks

## 2019-11-08 ENCOUNTER — PATIENT OUTREACH (OUTPATIENT)
Dept: OTHER | Facility: OTHER | Age: 34
End: 2019-11-08

## 2019-11-08 NOTE — LETTER
November 15, 2019     Meghna Aragon  [address] [place] [address]       Dear Meghna,    Welcome to [facility]! Our goal is to make care effective, proactive and convenient by using data you send us from home to better treat your chronic conditions.          My name is Lidia Kebede, and I am your dedicated Digital Medicine clinician. As an expert in medication management, I will help ensure that the medications you are taking continue to provide the intended benefits and help you reach your goals. You can reach me directly at [phone] or by sending me a message directly through your [facility] account.      I am Pam Lehman and I will be your health . My job is to help you identify lifestyle changes to improve your disease control. We will talk about nutrition, exercise, and other ways you may be able to adjust your current habits to better your health. Additionally, we will help ensure you are completing the tests and screenings that are necessary to help manage your conditions. You can reach me directly at [phone] or by sending me a message directly through your [facility] account.    Most importantly, YOU are at the center of this team. Together, we will work to improve your overall health and encourage you to meet your goals for a healthier lifestyle.     What we expect from YOU:  · Please take frequent home blood pressure measurements. We ask that you take at least 1 blood pressure reading per week, but more information will better help us get you know you. Be sure you rest for a few minutes before taking the reading in a quiet, comfortable place.     Be available to receive phone calls or [facility] messages, when appropriate, from your care team. Please let us know if there are any specific days or times that work best for us to reach you via phone.     Complete routine tests and screenings. Dont worry, we will help keep you on track!           What  you should expect from your Digital Medicine Care Team:   We will work with you to create a personalized plan of care and provide you with encouragement and education, including regarding lifestyle changes, that could help you manage your disease states.     We will adjust your current medications, if needed, and continue to monitor your long-term progress.     We will provide you and your physician with monthly progress reports after you have been in the program for more than 30 days.     We will send you reminders through [facility] and text messages to help ensure you do not miss any testing deadlines to help manage your disease states.    You will be able to reach us by phone or through your [facility] account by clicking our names under Care Team on the right side of the home screen.    I look forward to working with you to achieve your blood pressure goals!    We look forward to working with you to help manage your health,    Sincerely,    Your Digital Medicine Team    Please visit our websites to learn more:   · Hypertension: [email or web address]      Remember, we are not available for emergencies. If you have an emergency, please contact your doctors office directly or call [facility] on-call ([phone] or [phone]) or [phone].

## 2019-11-08 NOTE — LETTER
November 15, 2019     Meghna Aragon  [address] [place] [address]       Dear Meghna,    Welcome to [facility]! Our goal is to make care effective, proactive and convenient by using data you send us from home to better treat your chronic conditions.          My name is Lidia Kebede, and I am your dedicated Digital Medicine clinician. As an expert in medication management, I will help ensure that the medications you are taking continue to provide the intended benefits and help you reach your goals. You can reach me directly at [phone] or by sending me a message directly through your [facility] account.      I am Pam Lehman and I will be your health . My job is to help you identify lifestyle changes to improve your disease control. We will talk about nutrition, exercise, and other ways you may be able to adjust your current habits to better your health. Additionally, we will help ensure you are completing the tests and screenings that are necessary to help manage your conditions. You can reach me directly at [phone] or by sending me a message directly through your [facility] account.    Most importantly, YOU are at the center of this team. Together, we will work to improve your overall health and encourage you to meet your goals for a healthier lifestyle.     What we expect from YOU:  · Please take frequent home blood pressure measurements. We ask that you take at least 1 blood pressure reading per week, but more information will better help us get you know you. Be sure you rest for a few minutes before taking the reading in a quiet, comfortable place.     Be available to receive phone calls or [facility] messages, when appropriate, from your care team. Please let us know if there are any specific days or times that work best for us to reach you via phone.     Complete routine tests and screenings. Dont worry, we will help keep you on track!           What  you should expect from your Digital Medicine Care Team:   We will work with you to create a personalized plan of care and provide you with encouragement and education, including regarding lifestyle changes, that could help you manage your disease states.     We will adjust your current medications, if needed, and continue to monitor your long-term progress.     We will provide you and your physician with monthly progress reports after you have been in the program for more than 30 days.     We will send you reminders through [facility] and text messages to help ensure you do not miss any testing deadlines to help manage your disease states.    You will be able to reach us by phone or through your [facility] account by clicking our names under Care Team on the right side of the home screen.    I look forward to working with you to achieve your blood pressure goals!    We look forward to working with you to help manage your health,    Sincerely,    Your Digital Medicine Team    Please visit our websites to learn more:   · Hypertension: [email or web address]      Remember, we are not available for emergencies. If you have an emergency, please contact your doctors office directly or call [facility] on-call ([phone] or [phone]) or [phone].

## 2019-11-12 ENCOUNTER — PATIENT MESSAGE (OUTPATIENT)
Dept: OBSTETRICS AND GYNECOLOGY | Facility: CLINIC | Age: 34
End: 2019-11-12

## 2019-11-22 ENCOUNTER — PATIENT MESSAGE (OUTPATIENT)
Dept: INTERNAL MEDICINE | Facility: CLINIC | Age: 34
End: 2019-11-22

## 2019-11-22 NOTE — TELEPHONE ENCOUNTER
Contacted pharmacy and stated labetalol is on hold but is filling it. Informed patient via [employer].

## 2019-11-25 ENCOUNTER — PATIENT MESSAGE (OUTPATIENT)
Dept: INTERNAL MEDICINE | Facility: CLINIC | Age: 34
End: 2019-11-25

## 2019-11-29 ENCOUNTER — PATIENT OUTREACH (OUTPATIENT)
Dept: OTHER | Facility: OTHER | Age: 34
End: 2019-11-29

## 2019-11-29 ENCOUNTER — LAB VISIT (OUTPATIENT)
Dept: LAB | Facility: OTHER | Age: 34
End: 2019-11-29
Attending: INTERNAL MEDICINE
Payer: COMMERCIAL

## 2019-11-29 DIAGNOSIS — D64.9 ANEMIA, UNSPECIFIED TYPE: ICD-10-CM

## 2019-11-29 DIAGNOSIS — Z00.00 ANNUAL PHYSICAL EXAM: ICD-10-CM

## 2019-11-29 LAB
ESTIMATED AVG GLUCOSE: 111 MG/DL (ref 68–131)
FERRITIN SERPL-MCNC: 16 NG/ML (ref 20–300)
FOLATE SERPL-MCNC: 14.8 NG/ML (ref 4–24)
HBA1C MFR BLD HPLC: 5.5 % (ref 4–5.6)
IRON SERPL-MCNC: 39 UG/DL (ref 30–160)
SATURATED IRON: 9 % (ref 20–50)
TOTAL IRON BINDING CAPACITY: 447 UG/DL (ref 250–450)
TRANSFERRIN SERPL-MCNC: 302 MG/DL (ref 200–375)
VIT B12 SERPL-MCNC: 379 PG/ML (ref 210–950)

## 2019-11-29 PROCEDURE — 82607 VITAMIN B-12: CPT

## 2019-11-29 PROCEDURE — 82728 ASSAY OF FERRITIN: CPT

## 2019-11-29 PROCEDURE — 82746 ASSAY OF FOLIC ACID SERUM: CPT

## 2019-11-29 PROCEDURE — 83036 HEMOGLOBIN GLYCOSYLATED A1C: CPT

## 2019-11-29 PROCEDURE — 83540 ASSAY OF IRON: CPT

## 2019-11-29 PROCEDURE — 36415 COLL VENOUS BLD VENIPUNCTURE: CPT

## 2019-11-29 NOTE — PROGRESS NOTES
Digital Medicine: Health  Follow-Up    Breakfast: 2 eggs  Snack: mini-muffins  Lunch: cafeteria sandwich or grilled cheese and soup, red beans and meat from home, Chinese take-out (broccoli, pecan shrimp, rice) beef stir-rubio  Lactation tea, throughout the pregnancy she drank all water, now that she had the baby she doesn't have as much. Coke Zero  Dinner: can be anything, pizza, pastas. Feels like she does the worst at dinner. Cooks dinner during the week and gets take out on weekends.       The history is provided by the patient.     Follow Up  Follow-up reason(s): routine education      Routine Education Topics: eating patterns and physical activity        Intervention/Plan    There are no preventive care reminders to display for this patient.    Last 5 Patient Entered Readings                                      Current 30 Day Average: 149/88     Recent Readings 11/26/2019 11/25/2019 11/13/2019 11/11/2019 11/10/2019    SBP (mmHg) 148 158 138 139 140    DBP (mmHg) 85 94 82 79 82    Pulse 75 74 69 71 73                      Diet Screening   Breakfast is typically between. 2 eggs, smoothies.  Lunch is typically between. Cafteria food, leftovers from home, take-out.    Dinner is typically between. Stews, take-out, pasta.    Snacks are typically. Mini-muffins, ice cream, mixed fruit cups in coconut water.    Patient reports eating or drinking the following: fast food, soda, fruit, fresh vegetables and restaurant foodShe has the following dietary restrictions: noneShe does not skip meals regularly.  She has no standard fasting periods.      The patient states that she typically eats a non-home cooked meal (ex: dining out, take out, frozen meals) 3-4 times per week.  She cooks for self.    Patient does the shopping for groceries.  She gets groceries from the grocery store.      Barriers to a Healthy Diet: no barriers to healthy eating    Intervention(s): reducing sodium intake, reducing dining out and increasing  water intake    Assigning the following patient goals: maintain low sodium diet    Physical Activity Screening       She identified the following barriers to physical activity: no barriers to being active    Walks around clinic but no formal exercise.     Assigning the following patient goal(s): increase physical activity, 150 minutes of exercise per week and participate in exercise weekly    Medication Adherence Screening   She misses doses: once a week      Missed a dose one day this week.     Tobacco and Alcohol Screening       No tobacco    Occasional glass of wine      SDOH

## 2019-12-18 ENCOUNTER — NURSE TRIAGE (OUTPATIENT)
Dept: ADMINISTRATIVE | Facility: CLINIC | Age: 34
End: 2019-12-18

## 2019-12-18 ENCOUNTER — PATIENT OUTREACH (OUTPATIENT)
Dept: OTHER | Facility: OTHER | Age: 34
End: 2019-12-18

## 2019-12-18 ENCOUNTER — PATIENT MESSAGE (OUTPATIENT)
Dept: INTERNAL MEDICINE | Facility: CLINIC | Age: 34
End: 2019-12-18

## 2019-12-18 ENCOUNTER — PATIENT MESSAGE (OUTPATIENT)
Dept: OTHER | Facility: OTHER | Age: 34
End: 2019-12-18

## 2019-12-18 DIAGNOSIS — I10 HYPERTENSION, BENIGN: Primary | ICD-10-CM

## 2019-12-18 RX ORDER — LABETALOL 300 MG/1
300 TABLET, FILM COATED ORAL EVERY 12 HOURS
Qty: 60 TABLET | Refills: 6 | Status: SHIPPED | OUTPATIENT
Start: 2019-12-18 | End: 2020-03-27 | Stop reason: SDUPTHER

## 2019-12-18 NOTE — PROGRESS NOTES
"Received the following message from this patient:   "Hi Lidia,   I am not feeling well, I have headache that has been lingering throughout today. My coworker took my blood pressure and got 150/94, she also wanted to repeat it for me. I took my morning dose of labetalol and tylenol. I took the tylenol twice today, 1000 mg each time. Just wondering what to do. I did also message Dr. Fernandez.     Thanks,   Meghna "    Called patient to discuss symptoms, repeat reading, and advise her when to seek emergency medical attention. She did not answer. LVM requesting a call back.     Advised on voicemail when to seek emergency medical attention (BP >160/100 mmHg) with symptoms of hypertension present. Symptoms details on voicemail.     Patient developed HTN during second trimester of pregnancy. Treated and induced. EKG abnormalities noted. Per chart review, patient's mother had MI at 43 y/o.   Patient also has chronic tension headaches as a problem on her problem list for >3 years.     Will continue to monitor.   "

## 2019-12-18 NOTE — TELEPHONE ENCOUNTER
Headache that started today  lebatolol bid, already took morning dose   Took tylenol with no relief of headache  Blood pressure is 150/94 earlier, recheck on call it is 148/102 pulse 79  Discussed urgent care today. She says she will go there.  Reason for Disposition   Systolic BP  >= 160 OR Diastolic >= 100    Additional Information   Negative: Difficult to awaken or acting confused (e.g., disoriented, slurred speech)   Negative: Severe difficulty breathing (e.g., struggling for each breath, speaks in single words)   Negative: [1] Weakness of the face, arm or leg on one side of the body AND [2] new onset   Negative: [1] Numbness (i.e., loss of sensation) of the face, arm or leg on one side of the body AND [2] new onset   Negative: [1] Chest pain lasts > 5 minutes AND [2] history of heart disease  (i.e., heart attack, bypass surgery, angina, angioplasty, CHF)   Negative: [1] Chest pain AND [2] took nitrogylcerin AND [3] pain was not relieved   Negative: Sounds like a life-threatening emergency to the triager   Negative: Symptom is main concern  (e.g., headache, chest pain)   Negative: Low blood pressure is main concern   Negative: [1] Systolic BP  >= 160 OR Diastolic >= 100 AND [2] cardiac or neurologic symptoms (e.g., chest pain, difficulty breathing, unsteady gait, blurred vision)   Negative: [1] Pregnant > 20 weeks AND [2] new hand or face swelling   Negative: [1] Pregnant > 20 weeks AND [2] BP Systolic BP  >= 140 OR Diastolic >= 90   Negative: [1] Systolic BP  >= 200 OR Diastolic >= 120  AND [2] having NO cardiac or neurologic symptoms   Negative: [1] Postpartum < 6 weeks AND [2] BP Systolic BP  >= 140 OR Diastolic >= 90   Negative: [1] Systolic BP  >= 180 OR Diastolic >= 110 AND [2] missed most recent dose of blood pressure medication   Negative: Systolic BP  >= 180 OR Diastolic >= 110   Negative: Ran out of BP medications    Protocols used: HIGH BLOOD PRESSURE-A-

## 2019-12-18 NOTE — TELEPHONE ENCOUNTER
Agree with repeating blood pressure check after taking medication.   Recommend resting for 5-10 min prior to checking this.     Pressure have been elevated upon review of digital hypertension readings   - recommend increase labetalol from 200mg every 12 hours to 300mg every 12 hours. New rx sent to pharmacy now     - recommend continue with digital hypertension follow up to continue monitoring bp readings

## 2019-12-27 ENCOUNTER — PATIENT OUTREACH (OUTPATIENT)
Dept: OTHER | Facility: OTHER | Age: 34
End: 2019-12-27

## 2019-12-27 NOTE — PROGRESS NOTES
Digital Medicine: Health  Follow-Up    Stated she thinks okay, but had an incident last week where she had an headache that would not go away. They increased her medication and that seems to helped. New Year's Goal is to lose weight. Her and her  are going to try low-carb/keto-zahira.     The history is provided by the patient.     Follow Up  Follow-up reason(s): routine education      Routine Education Topics: eating patterns and physical activity        INTERVENTION(S)  encouragement/support and goal setting    PLAN  patient verbalizes understanding      There are no preventive care reminders to display for this patient.    Last 5 Patient Entered Readings                                      Current 30 Day Average: 150/91     Recent Readings 12/19/2019 12/18/2019 12/6/2019 11/26/2019 11/25/2019    SBP (mmHg) 147 164 140 148 158    DBP (mmHg) 89 95 88 85 94    Pulse 76 69 79 75 74                      Diet Screening   No change to diet.      Physical Activity Screening   No change to exercise routine.        Medication Adherence Screening       Increased her dosage of medication. Was having a headache last week that wouldn't go away, this seems to have helped.       SDOH

## 2020-01-23 ENCOUNTER — PATIENT OUTREACH (OUTPATIENT)
Dept: OTHER | Facility: OTHER | Age: 35
End: 2020-01-23

## 2020-02-08 ENCOUNTER — NURSE TRIAGE (OUTPATIENT)
Dept: ADMINISTRATIVE | Facility: CLINIC | Age: 35
End: 2020-02-08

## 2020-02-08 ENCOUNTER — OFFICE VISIT (OUTPATIENT)
Dept: URGENT CARE | Facility: CLINIC | Age: 35
End: 2020-02-08
Payer: COMMERCIAL

## 2020-02-08 VITALS
SYSTOLIC BLOOD PRESSURE: 147 MMHG | HEIGHT: 61 IN | OXYGEN SATURATION: 98 % | HEART RATE: 77 BPM | WEIGHT: 200 LBS | TEMPERATURE: 98 F | BODY MASS INDEX: 37.76 KG/M2 | DIASTOLIC BLOOD PRESSURE: 77 MMHG

## 2020-02-08 DIAGNOSIS — H65.03 NON-RECURRENT ACUTE SEROUS OTITIS MEDIA OF BOTH EARS: Primary | ICD-10-CM

## 2020-02-08 PROCEDURE — 99213 OFFICE O/P EST LOW 20 MIN: CPT | Mod: S$GLB,,, | Performed by: PHYSICIAN ASSISTANT

## 2020-02-08 PROCEDURE — 99213 PR OFFICE/OUTPT VISIT, EST, LEVL III, 20-29 MIN: ICD-10-PCS | Mod: S$GLB,,, | Performed by: PHYSICIAN ASSISTANT

## 2020-02-08 RX ORDER — AMOXICILLIN AND CLAVULANATE POTASSIUM 875; 125 MG/1; MG/1
1 TABLET, FILM COATED ORAL 2 TIMES DAILY
Qty: 14 TABLET | Refills: 0 | Status: SHIPPED | OUTPATIENT
Start: 2020-02-08 | End: 2020-02-15

## 2020-02-08 RX ORDER — PREDNISONE 10 MG/1
10 TABLET ORAL DAILY
Qty: 3 TABLET | Refills: 0 | Status: SHIPPED | OUTPATIENT
Start: 2020-02-08 | End: 2020-02-11

## 2020-02-08 NOTE — PATIENT INSTRUCTIONS
PLEASE READ YOUR DISCHARGE INSTRUCTIONS ENTIRELY AS IT CONTAINS IMPORTANT INFORMATION.  - Rest.    - Drink plenty of fluids.    - Tylenol or Ibuprofen as directed as needed for fever/pain.    - If you were prescribed antibiotics, please take them to completion.  - If you are female and on birth control pills - please use additional methods of contraception to prevent pregnancy while on antibiotics and for one cycle after.   - If you were prescribed a narcotic medication or muscle relaxer, do not drive or operate heavy equipment or machinery while taking these medications, as they can cause drowsiness.   - If you smoke, please stop smoking.  -You must understand that you've received an Urgent Care treatment only and that you may be released before all your medical problems are known or treated. You, the patient, will    arrange for follow up care as instructed. Please arrange follow up with your primary medical clinic as soon as possible.   - Follow up with your PCP or specialty clinic as directed in the next 1-2 weeks if not improved or as needed.  You can call [phone] to schedule an appointment with the appropriate provider.    - Please return to Urgent Care or to the Emergency Department if your symptoms worsen.    Patient aware and verbalized understanding.    Middle Ear Infection (Adult)  You have an infection of the middle ear, the space behind the eardrum. This is also called acute otitis media (AOM). Sometimes it is caused by the common cold. This is because congestion can block the internal passage (eustachian tube) that drains fluid from the middle ear. When the middle ear fills with fluid, bacteria can grow there and cause an infection. Oral antibiotics are used to treat this illness, not ear drops. Symptoms usually start to improve within 1 to 2 days of treatment.    Home care  The following are general care guidelines:  · Finish all of the antibiotic medicine given, even though you may feel better  after the first few days.  · You may use over-the-counter medicine, such as acetaminophen or ibuprofen, to control pain and fever, unless something else was prescribed. If you have chronic liver or kidney disease or have ever had a stomach ulcer or gastrointestinal bleeding, talk with your healthcare provider before using these medicines. Do not give aspirin to anyone under 18 years of age who has a fever. It may cause severe illness or death.  Follow-up care  Follow up with your healthcare provider, or as advised, in 2 weeks if all symptoms have not gotten better, or if hearing doesn't go back to normal within 1 month.  When to seek medical advice  Call your healthcare provider right away if any of these occur:  · Ear pain gets worse or does not improve after 3 days of treatment  · Unusual drowsiness or confusion  · Neck pain, stiff neck, or headache  · Fluid or blood draining from the ear canal  · Fever of 100.4°F (38°C) or as advised   · Seizure  Date Last Reviewed: 6/1/2016  © 2268-0911 [employer]. [address] [place] [address], [place] [address]. All rights reserved. This information is not intended as a substitute for professional medical care. Always follow your healthcare professional's instructions.

## 2020-02-08 NOTE — TELEPHONE ENCOUNTER
Reason for Disposition   History of ear drum perforation, tubes or ear surgery    Additional Information   Negative: Injury to the ear   Negative: Injury to ear canal from cotton swab (e.g., Q-tip) or doctor's ear exam   Negative: Foreign body stuck in the ear (e.g., bug, piece of cotton)   Negative: Sudden onset of hearing loss   Negative: Dizziness is main symptom   Negative: Pain or discomfort in or around ear is main symptom   Negative: Patient sounds very sick or weak to the triager   Negative: Sudden onset of ear pain or bleeding after long - thin object was inserted into the ear canal (e.g., pencil, Q-tip)   Negative: [1] Ear pain after ear syringing (i.e., squirting liquid into ear canal to remove wax) AND [2] severe   Negative: [1] Ear pain after ear syringing (i.e., squirting liquid into ear canal to remove wax) AND [2] persists > 1 hour   Negative: Walking is very unsteady  (Exception: brief dizziness that occurs with ear syringing)   Negative: Redness or rash of outer ear   Negative: Pus from the ear canal (e.g., yellow or green discharge)    Protocols used: EARWAX-A-AH    Pt stated she has wax build up in her ear. Stated she used OTC wax remover and still has a feeling of fullness in her ears. Reports history of ear surgeries. Per triage protocol, advised to see a Physician within 3 days. Pt verbalized understanding.

## 2020-02-08 NOTE — PROGRESS NOTES
"Subjective:       Patient ID: Meghna Aragon is a 34 y.o. female.    Vitals:  height is 5' 1" (1.549 m) and weight is 90.7 kg (200 lb). Her oral temperature is 98.1 °F (36.7 °C). Her blood pressure is 147/77 (abnormal) and her pulse is 77. Her oxygen saturation is 98%.     Chief Complaint: Ear Problem    Ms. Aragon presents for evaluation of bilateral ear pain and pressure.  She is also having trouble hearing out of her ears.  She recently had an URI earlier this week and all the symptoms resolved except the ear pain. She denies any ear drainage.  No fevers or chills.  She has been taking ear drops for symptoms without relief.    Otalgia    There is pain in both ears. This is a new problem. The current episode started in the past 7 days (Monday ). The problem occurs constantly. The problem has been unchanged. There has been no fever. The pain is at a severity of 9/10. The pain is mild. Associated symptoms include hearing loss. Pertinent negatives include no coughing, diarrhea, ear discharge, headaches, rash, sore throat or vomiting. She has tried ear drops for the symptoms. The treatment provided no relief.       Constitution: Negative for chills, fatigue and fever.   HENT: Positive for ear pain and hearing loss. Negative for ear discharge, congestion and sore throat.    Neck: Negative for painful lymph nodes.   Cardiovascular: Negative for chest pain and leg swelling.   Eyes: Negative for double vision and blurred vision.   Respiratory: Negative for cough and shortness of breath.    Gastrointestinal: Negative for nausea, vomiting and diarrhea.   Genitourinary: Negative for dysuria, frequency, urgency and history of kidney stones.   Musculoskeletal: Negative for joint pain, joint swelling, muscle cramps and muscle ache.   Skin: Negative for color change, pale, rash and bruising.   Allergic/Immunologic: Negative for seasonal allergies.   Neurological: Negative for dizziness, history of vertigo, " light-headedness, passing out and headaches.   Hematologic/Lymphatic: Negative for swollen lymph nodes.   Psychiatric/Behavioral: Negative for nervous/anxious, sleep disturbance and depression. The patient is not nervous/anxious.        Objective:      Physical Exam   Constitutional: She is oriented to person, place, and time. She appears well-developed and well-nourished. She is cooperative.  Non-toxic appearance. She does not have a sickly appearance. She does not appear ill. No distress.   HENT:   Head: Normocephalic and atraumatic.   Right Ear: Hearing, external ear and ear canal normal. Tympanic membrane is erythematous. Tympanic membrane is not perforated and not bulging. A middle ear effusion is present.   Left Ear: Hearing, external ear and ear canal normal. Tympanic membrane is erythematous. Tympanic membrane is not perforated and not bulging. A middle ear effusion is present.   Nose: Nose normal. No mucosal edema, rhinorrhea or nasal deformity. No epistaxis. Right sinus exhibits no maxillary sinus tenderness and no frontal sinus tenderness. Left sinus exhibits no maxillary sinus tenderness and no frontal sinus tenderness.   Mouth/Throat: Uvula is midline, oropharynx is clear and moist and mucous membranes are normal. No trismus in the jaw. Normal dentition. No uvula swelling. No oropharyngeal exudate, posterior oropharyngeal edema or posterior oropharyngeal erythema.   Eyes: Conjunctivae and lids are normal. No scleral icterus.   Neck: Trachea normal, full passive range of motion without pain and phonation normal. Neck supple. No neck rigidity. No edema and no erythema present.   Cardiovascular: Normal rate, regular rhythm, normal heart sounds, intact distal pulses and normal pulses.   Pulmonary/Chest: Effort normal and breath sounds normal. No stridor. No respiratory distress. She has no decreased breath sounds. She has no wheezes. She has no rhonchi. She has no rales.   Abdominal: Normal appearance.    Musculoskeletal: Normal range of motion. She exhibits no edema or deformity.   Lymphadenopathy:     She has no cervical adenopathy.   Neurological: She is alert and oriented to person, place, and time. She exhibits normal muscle tone. Coordination normal.   Skin: Skin is warm, dry, intact, not diaphoretic and not pale.   Psychiatric: She has a normal mood and affect. Her speech is normal and behavior is normal. Judgment and thought content normal. Cognition and memory are normal.   Nursing note and vitals reviewed.        Assessment:       1. Non-recurrent acute serous otitis media of both ears        Plan:         Non-recurrent acute serous otitis media of both ears    Other orders  -     amoxicillin-clavulanate 875-125mg (AUGMENTIN) 875-125 mg per tablet; Take 1 tablet by mouth 2 (two) times daily. for 7 days  Dispense: 14 tablet; Refill: 0  -     predniSONE (DELTASONE) 10 MG tablet; Take 1 tablet (10 mg total) by mouth once daily. for 3 days  Dispense: 3 tablet; Refill: 0    Patient is breastfeeding.  Discussed risk/benefit & low dose is acceptable for breastfeeding.  She would like the prednisone.     Patient Instructions   PLEASE READ YOUR DISCHARGE INSTRUCTIONS ENTIRELY AS IT CONTAINS IMPORTANT INFORMATION.  - Rest.    - Drink plenty of fluids.    - Tylenol or Ibuprofen as directed as needed for fever/pain.    - If you were prescribed antibiotics, please take them to completion.  - If you are female and on birth control pills - please use additional methods of contraception to prevent pregnancy while on antibiotics and for one cycle after.   - If you were prescribed a narcotic medication or muscle relaxer, do not drive or operate heavy equipment or machinery while taking these medications, as they can cause drowsiness.   - If you smoke, please stop smoking.  -You must understand that you've received an Urgent Care treatment only and that you may be released before all your medical problems are known or  treated. You, the patient, will    arrange for follow up care as instructed. Please arrange follow up with your primary medical clinic as soon as possible.   - Follow up with your PCP or specialty clinic as directed in the next 1-2 weeks if not improved or as needed.  You can call [phone] to schedule an appointment with the appropriate provider.    - Please return to Urgent Care or to the Emergency Department if your symptoms worsen.    Patient aware and verbalized understanding.    Middle Ear Infection (Adult)  You have an infection of the middle ear, the space behind the eardrum. This is also called acute otitis media (AOM). Sometimes it is caused by the common cold. This is because congestion can block the internal passage (eustachian tube) that drains fluid from the middle ear. When the middle ear fills with fluid, bacteria can grow there and cause an infection. Oral antibiotics are used to treat this illness, not ear drops. Symptoms usually start to improve within 1 to 2 days of treatment.    Home care  The following are general care guidelines:  · Finish all of the antibiotic medicine given, even though you may feel better after the first few days.  · You may use over-the-counter medicine, such as acetaminophen or ibuprofen, to control pain and fever, unless something else was prescribed. If you have chronic liver or kidney disease or have ever had a stomach ulcer or gastrointestinal bleeding, talk with your healthcare provider before using these medicines. Do not give aspirin to anyone under 18 years of age who has a fever. It may cause severe illness or death.  Follow-up care  Follow up with your healthcare provider, or as advised, in 2 weeks if all symptoms have not gotten better, or if hearing doesn't go back to normal within 1 month.  When to seek medical advice  Call your healthcare provider right away if any of these occur:  · Ear pain gets worse or does not improve after 3 days of  treatment  · Unusual drowsiness or confusion  · Neck pain, stiff neck, or headache  · Fluid or blood draining from the ear canal  · Fever of 100.4°F (38°C) or as advised   · Seizure  Date Last Reviewed: 6/1/2016  © 0574-1477 [facility]. [address] [place] [id number]. All rights reserved. This information is not intended as a substitute for professional medical care. Always follow your healthcare professional's instructions.

## 2020-02-14 NOTE — PROGRESS NOTES
Digital Medicine: Health  Follow-Up    Stated things have been stressful for her lately with her baby.    Hasn't started keto or low-carb yet, but wants to get back into it. No other changes noted at this time.    Hasn't been taking readings because of stress and forgetting. Was at urgent care recently and BP was 147/77. Asked ehr to take a few more readings for her to get back on track.     The history is provided by the patient.     Follow Up  Follow-up reason(s): routine education and goal follow-up      Routine Education Topics: eating patterns and physical activity        INTERVENTION(S)  recommended diet modifications, encouragement/support, goal setting, denied resources and denied questions    PLAN  patient verbalizes understanding      There are no preventive care reminders to display for this patient.    Last 5 Patient Entered Readings                                      Current 30 Day Average: 145/82     Recent Readings 1/18/2020 12/19/2019 12/18/2019 12/6/2019 11/26/2019    SBP (mmHg) 145 147 164 140 148    DBP (mmHg) 82 89 95 88 85    Pulse 85 76 69 79 75                      Diet Screening   No change to diet.      Physical Activity Screening   No change to exercise routine.          SDOH

## 2020-03-13 ENCOUNTER — PATIENT OUTREACH (OUTPATIENT)
Dept: OTHER | Facility: OTHER | Age: 35
End: 2020-03-13

## 2020-03-27 ENCOUNTER — TELEPHONE (OUTPATIENT)
Dept: INTERNAL MEDICINE | Facility: CLINIC | Age: 35
End: 2020-03-27

## 2020-03-27 ENCOUNTER — OFFICE VISIT (OUTPATIENT)
Dept: INTERNAL MEDICINE | Facility: CLINIC | Age: 35
End: 2020-03-27
Attending: INTERNAL MEDICINE
Payer: COMMERCIAL

## 2020-03-27 DIAGNOSIS — I10 HYPERTENSION, BENIGN: Primary | ICD-10-CM

## 2020-03-27 PROCEDURE — 99212 PR OFFICE/OUTPT VISIT, EST, LEVL II, 10-19 MIN: ICD-10-PCS | Mod: 95,,, | Performed by: INTERNAL MEDICINE

## 2020-03-27 PROCEDURE — 99212 OFFICE O/P EST SF 10 MIN: CPT | Mod: 95,,, | Performed by: INTERNAL MEDICINE

## 2020-03-27 RX ORDER — LABETALOL 300 MG/1
300 TABLET, FILM COATED ORAL EVERY 12 HOURS
Qty: 180 TABLET | Refills: 3 | Status: SHIPPED | OUTPATIENT
Start: 2020-03-27 | End: 2020-09-16

## 2020-03-27 NOTE — PROGRESS NOTES
Subjective:   Patient ID: Meghna Aragon is a 34 y.o. female  Chief complaint: No chief complaint on file.      HPI   The patient location is: [facility]  The chief complaint leading to consultation is: HTN follow up   Visit type: Virtual visit with synchronous audio and video at start of appt but then lost video and spoke to pt as audible still functioning   Total time spent with patient: 15  Each patient to whom he or she provides medical services by telemedicine is:  (1) informed of the relationship between the physician and patient and the respective role of any other health care provider with respect to management of the patient; and (2) notified that he or she may decline to receive medical services by telemedicine and may withdraw from such care at any time.    Notes:   Patient opted for virtual visit due to risk of COVID- 19.    HTN:     Taking labetalol 300mg bid - will skip evening dose 2-3 times per week     Home bp:   120/80 - 150-160/90s   Lower numbers are when she is consistent with her medication and higher numbers are when she misses her evening dose  She is currently tolerating the current dose of medication  No ha, cp or sob, vision changes     To then rolled in the digital hypertension program and agrees to submit more blood pressures for additional monitoring  She is monitoring her diet    Review of Systems    Objective:  There were no vitals filed for this visit.  There is no height or weight on file to calculate BMI.    Physical Exam   Constitutional: She is oriented to person, place, and time. She appears well-developed and well-nourished. No distress.   Eyes: Conjunctivae and EOM are normal. Right eye exhibits no discharge. Left eye exhibits no discharge.   Pulmonary/Chest: Effort normal. No respiratory distress.   Neurological: She is alert and oriented to person, place, and time.   Skin: Skin is warm. She is not diaphoretic. No erythema.   Psychiatric: She has a normal mood and affect.  Her behavior is normal. Judgment and thought content normal.       Assessment:  1. Hypertension, benign        Plan:  Diagnoses and all orders for this visit:    Hypertension, benign    Other orders  -     labetaloL (NORMODYNE) 300 MG tablet; Take 1 tablet (300 mg total) by mouth every 12 (twelve) hours.     For now recommend continue current medication but take consistently twice daily  Recommend monitor blood pressure more frequently at home to the digital hypertension program which is already set up  Can consider increasing dose of medication versus adding 2nd med pending blood pressure trends  Recommend low-salt diet and graded exercise program to promote weight loss     Health Maintenance   Topic Date Due    Pap Smear with HPV Cotest  03/06/2022    TETANUS VACCINE  07/26/2029    Lipid Panel  Completed

## 2020-04-01 RX ORDER — NIFEDIPINE 30 MG/1
30 TABLET, EXTENDED RELEASE ORAL DAILY
Qty: 30 TABLET | Refills: 1 | Status: SHIPPED | OUTPATIENT
Start: 2020-04-01 | End: 2020-06-09 | Stop reason: SDUPTHER

## 2020-04-01 NOTE — PROGRESS NOTES
2nd attempt - NA, LVM requesting a call back.     BP readings infrequently taken.     Last 5 Patient Entered Readings                                      Current 30 Day Average: 137/86     Recent Readings 3/29/2020 3/27/2020 3/18/2020 1/18/2020 12/19/2019    SBP (mmHg) 157 136 119 145 147    DBP (mmHg) 91 84 84 82 89    Pulse 74 85 80 85 76        Hypertension Medications             labetaloL (NORMODYNE) 300 MG tablet Take 1 tablet (300 mg total) by mouth every 12 (twelve) hours.        PCP note mentions medication non-compliance, specifically with PM dose of labetalol.

## 2020-04-07 ENCOUNTER — PATIENT OUTREACH (OUTPATIENT)
Dept: OTHER | Facility: OTHER | Age: 35
End: 2020-04-07

## 2020-04-07 DIAGNOSIS — I10 HYPERTENSION, BENIGN: Primary | ICD-10-CM

## 2020-04-07 NOTE — PROGRESS NOTES
Digital Medicine: Clinician Follow-Up    LVM to discuss BP readings and medications. Medication change made with last outreach.         Last 5 Patient Entered Readings                                      Current 30 Day Average: 134/86     Recent Readings 4/4/2020 4/1/2020 3/29/2020 3/27/2020 3/18/2020    SBP (mmHg) 125 134 157 136 119    DBP (mmHg) 85 85 91 84 84    Pulse 82 85 74 85 80           Hypertension Medications             labetaloL (NORMODYNE) 300 MG tablet Take 1 tablet (300 mg total) by mouth every 12 (twelve) hours.    NIFEdipine (PROCARDIA-XL) 30 MG (OSM) 24 hr tablet Take 1 tablet (30 mg total) by mouth once daily.

## 2020-04-09 ENCOUNTER — PATIENT OUTREACH (OUTPATIENT)
Dept: OTHER | Facility: OTHER | Age: 35
End: 2020-04-09

## 2020-04-09 NOTE — PROGRESS NOTES
Digital Medicine: Health  Follow-Up    Pt called me back.     Avg BP as of Apr 9, 2020 is 130/82.     Still working ([employer]) at [employer].     Diet and exercise have gotten off track. She's been cooking more which has helped her stopped eating out and she wants to start cooking more healthy when she has more time to.     Has been working with Lidia on medication changes and she's happy with them.       The history is provided by the patient.     Follow Up  Follow-up reason(s): reading review and routine education          INTERVENTION(S)  recommended diet modifications, recommend physical activity, encouragement/support, denied resources and denied questions    PLAN  patient verbalizes understanding and continue monitoring      There are no preventive care reminders to display for this patient.    Last 5 Patient Entered Readings                                      Current 30 Day Average: 130/82     Recent Readings 4/8/2020 4/7/2020 4/4/2020 4/1/2020 3/29/2020    SBP (mmHg) 121 117 125 134 157    DBP (mmHg) 71 74 85 85 91    Pulse 88 84 82 85 74                      Diet Screening   She has the following dietary restrictions: none    The patient states that she typically eats a non-home cooked meal (ex: dining out, take out, frozen meals) 0 times per week.  She cooks for self.    Patient does the shopping for groceries.  She gets groceries from the grocery store.      Intervention(s): reducing sodium intake, reducing processed foods and reducing seasonings    Assigning the following patient goals: meal plan and maintain low sodium diet    Physical Activity Screening   When asked if exercising, patient responded: no    Exercise has stopped currently.     Intervention(s): goal setting     Assigning the following patient goal(s): increase physical activity, 150 minutes of exercise per week and participate in exercise weekly    Medication Adherence Screening   She did not miss a dose this month.      SDOH

## 2020-04-15 NOTE — PROGRESS NOTES
Patient returned my call - left voicemail stating she is doing and feeling well on nifedipine.     Called back to discuss further and to ensure she is weaning labetalol. NA, LVM.

## 2020-04-21 DIAGNOSIS — Z01.84 ANTIBODY RESPONSE EXAMINATION: ICD-10-CM

## 2020-05-12 ENCOUNTER — PATIENT OUTREACH (OUTPATIENT)
Dept: OTHER | Facility: OTHER | Age: 35
End: 2020-05-12

## 2020-05-12 NOTE — PROGRESS NOTES
Digital Medicine: Health  Follow-Up    The history is provided by the patient.     Follow Up  Follow-up reason(s): reading review and goal follow-up    Called to introduce myself as temporary HC while HC is out.     Patient states she is feeling great, currently at work but a few minutes to talk.     Diet and exercise is still put on hold due to COVID.               INTERVENTION(S)  encouragement/support and denied questions    PLAN  patient verbalizes understanding, patient amenable to changes and continue monitoring    BP readings stable and average within goal.     Plan to follow up in 4 weeks       There are no preventive care reminders to display for this patient.    Last 5 Patient Entered Readings                                      Current 30 Day Average: 124/79     Recent Readings 5/4/2020 4/21/2020 4/20/2020 4/16/2020 4/10/2020    SBP (mmHg) 136 113 115 130 125    DBP (mmHg) 85 72 74 83 78    Pulse 89 89 106 97 90                      Diet Screening   No change to diet.  She has the following dietary restrictions: low sodium diet    Physical Activity Screening   No change to exercise routine.    When asked if exercising, patient responded: no      SDOH

## 2020-05-21 DIAGNOSIS — Z01.84 ANTIBODY RESPONSE EXAMINATION: ICD-10-CM

## 2020-05-26 ENCOUNTER — PATIENT MESSAGE (OUTPATIENT)
Dept: CARDIOLOGY | Facility: CLINIC | Age: 35
End: 2020-05-26

## 2020-06-09 ENCOUNTER — PATIENT MESSAGE (OUTPATIENT)
Dept: INTERNAL MEDICINE | Facility: CLINIC | Age: 35
End: 2020-06-09

## 2020-06-09 DIAGNOSIS — I10 HYPERTENSION, BENIGN: ICD-10-CM

## 2020-06-09 RX ORDER — NIFEDIPINE 30 MG/1
30 TABLET, EXTENDED RELEASE ORAL DAILY
Qty: 30 TABLET | Refills: 3 | Status: SHIPPED | OUTPATIENT
Start: 2020-06-09 | End: 2020-11-06 | Stop reason: SDUPTHER

## 2020-06-16 ENCOUNTER — PATIENT OUTREACH (OUTPATIENT)
Dept: OTHER | Facility: OTHER | Age: 35
End: 2020-06-16

## 2020-06-20 DIAGNOSIS — Z01.84 ANTIBODY RESPONSE EXAMINATION: ICD-10-CM

## 2020-06-30 NOTE — PROGRESS NOTES
Digital Medicine: Health  Follow-Up    Avg BP as of June 30, 2020 is 134/87    Trying to eat better. Cutting back on what she's eating and trying not to over do it. Was getting headaches and Lidia reached out and they think it's diet related.    High reading on May 31, 2020 was most likely from the machine not being charged, she felt fine.     No PA change.           INTERVENTION(S)  recommend physical activity, reviewed monitoring technique, encouragement/support, goal setting, denied resources and denied questions    PLAN  patient verbalizes understanding and continue monitoring      There are no preventive care reminders to display for this patient.    Last 5 Patient Entered Readings                                      Current 30 Day Average: 134/87     Recent Readings 6/29/2020 6/15/2020 6/13/2020 5/31/2020 5/31/2020    SBP (mmHg) 127 131 128 149 156    DBP (mmHg) 78 79 79 89 108    Pulse 83 77 85 91 85                      Diet Screening       Trying to get back on track with diet. Had some headaches and Lidia said it was most likely diet related. She's been watching what she's eating and trying to cut back on it as well.     Assigning the following patient goals: maintain low sodium diet    Physical Activity Screening   No change to exercise routine.    When asked if exercising, patient responded: no    Assigning the following patient goal(s): increase physical activity, 150 minutes of exercise per week and participate in exercise weekly      SDOH

## 2020-07-20 DIAGNOSIS — Z01.84 ANTIBODY RESPONSE EXAMINATION: ICD-10-CM

## 2020-07-28 ENCOUNTER — PATIENT OUTREACH (OUTPATIENT)
Dept: OTHER | Facility: OTHER | Age: 35
End: 2020-07-28

## 2020-08-13 ENCOUNTER — TELEPHONE (OUTPATIENT)
Dept: CARDIOLOGY | Facility: CLINIC | Age: 35
End: 2020-08-13

## 2020-08-14 NOTE — PROGRESS NOTES
Digital Medicine: Health  Follow-Up    The history is provided by the patient.             Reason for review: Blood pressure not at goal        Topics Covered on Call: physical activity and Diet    Additional Follow-up details: Avg BP as of Aug 14, 2020 is 132/80. Hasn't been taking readings. Reminded her to charge her device and let me know if she has any issues with the jose.     Has been trying to eat better and has been doing low-carb.     No exercise changes.     No medication issues.         Diet-Change      Dietary Improvements:Has been trying to eat better and eat lower carbs.     Dietary Indiscretions:          Physical Activity-no change to routine  No change to exercise routine.       Additional physical activity details: Not currently exercising.       Medication Adherence-Medication adherence was assessed.          Additional monitoring needed. Look out for readings  Continue current diet/physical activity routine.  Instructed to charge device.       Addressed any questions or concerns and patient has my contact information if needed prior to next outreach. Patient verbalizes understanding.      Explained the importance of self-monitoring and medication adherence. Encouraged the patient to communicate with their health  for lifestyle modifications to help improve or maintain a healthy lifestyle.            There are no preventive care reminders to display for this patient.    Last 5 Patient Entered Readings                                      Current 30 Day Average: 132/80     Recent Readings 7/22/2020 7/18/2020 6/29/2020 6/15/2020 6/13/2020    SBP (mmHg) 128 135 127 131 128    DBP (mmHg) 75 84 78 79 79    Pulse 75 78 83 77 85

## 2020-08-19 DIAGNOSIS — Z01.84 ANTIBODY RESPONSE EXAMINATION: ICD-10-CM

## 2020-08-26 ENCOUNTER — TELEPHONE (OUTPATIENT)
Dept: CARDIOLOGY | Facility: CLINIC | Age: 35
End: 2020-08-26

## 2020-08-26 DIAGNOSIS — I25.10 ATHEROSCLEROSIS OF NATIVE CORONARY ARTERY OF NATIVE HEART WITHOUT ANGINA PECTORIS: ICD-10-CM

## 2020-08-26 NOTE — TELEPHONE ENCOUNTER
Called pt and scheduled her for f/u and CT. Pt states she was advised by Dr. Murrieta of the cost

## 2020-08-26 NOTE — TELEPHONE ENCOUNTER
please let her know that calcium score will cost 100 dollar out of pocket. Is she done with lactation? She cannot have this test while lactating.

## 2020-09-11 ENCOUNTER — PATIENT OUTREACH (OUTPATIENT)
Dept: OTHER | Facility: OTHER | Age: 35
End: 2020-09-11

## 2020-09-16 ENCOUNTER — OFFICE VISIT (OUTPATIENT)
Dept: INTERNAL MEDICINE | Facility: CLINIC | Age: 35
End: 2020-09-16
Attending: FAMILY MEDICINE
Payer: COMMERCIAL

## 2020-09-16 ENCOUNTER — LAB VISIT (OUTPATIENT)
Dept: LAB | Facility: OTHER | Age: 35
End: 2020-09-16
Attending: FAMILY MEDICINE
Payer: COMMERCIAL

## 2020-09-16 DIAGNOSIS — I10 HYPERTENSION, BENIGN: ICD-10-CM

## 2020-09-16 DIAGNOSIS — R39.9 UTI SYMPTOMS: ICD-10-CM

## 2020-09-16 DIAGNOSIS — R39.9 UTI SYMPTOMS: Primary | ICD-10-CM

## 2020-09-16 LAB
BACTERIA #/AREA URNS HPF: ABNORMAL /HPF
BILIRUB UR QL STRIP: NEGATIVE
CLARITY UR: ABNORMAL
COLOR UR: YELLOW
GLUCOSE UR QL STRIP: NEGATIVE
HGB UR QL STRIP: ABNORMAL
KETONES UR QL STRIP: NEGATIVE
LEUKOCYTE ESTERASE UR QL STRIP: ABNORMAL
MICROSCOPIC COMMENT: ABNORMAL
NITRITE UR QL STRIP: NEGATIVE
PH UR STRIP: 7 [PH] (ref 5–8)
PROT UR QL STRIP: NEGATIVE
RBC #/AREA URNS HPF: 9 /HPF (ref 0–4)
SP GR UR STRIP: 1.02 (ref 1–1.03)
SQUAMOUS #/AREA URNS HPF: 25 /HPF
URN SPEC COLLECT METH UR: ABNORMAL
UROBILINOGEN UR STRIP-ACNC: NEGATIVE EU/DL
WBC #/AREA URNS HPF: 19 /HPF (ref 0–5)

## 2020-09-16 PROCEDURE — 87088 URINE BACTERIA CULTURE: CPT

## 2020-09-16 PROCEDURE — 87086 URINE CULTURE/COLONY COUNT: CPT

## 2020-09-16 PROCEDURE — 99214 PR OFFICE/OUTPT VISIT, EST, LEVL IV, 30-39 MIN: ICD-10-PCS | Mod: 95,,, | Performed by: FAMILY MEDICINE

## 2020-09-16 PROCEDURE — 81000 URINALYSIS NONAUTO W/SCOPE: CPT

## 2020-09-16 PROCEDURE — 87077 CULTURE AEROBIC IDENTIFY: CPT

## 2020-09-16 PROCEDURE — 87186 SC STD MICRODIL/AGAR DIL: CPT

## 2020-09-16 PROCEDURE — 99214 OFFICE O/P EST MOD 30 MIN: CPT | Mod: 95,,, | Performed by: FAMILY MEDICINE

## 2020-09-16 RX ORDER — NITROFURANTOIN 25; 75 MG/1; MG/1
100 CAPSULE ORAL 2 TIMES DAILY
Qty: 14 CAPSULE | Refills: 0 | Status: SHIPPED | OUTPATIENT
Start: 2020-09-16 | End: 2020-09-23

## 2020-09-16 NOTE — PROGRESS NOTES
The patient location is:  work  The chief complaint leading to consultation is:  UTI symptom    Visit type: audiovisual    Face to Face time with patient:  7 min  Twelve minutes of total time spent on the encounter, which includes face to face time and non-face to face time preparing to see the patient (eg, review of tests), Obtaining and/or reviewing separately obtained history, Documenting clinical information in the electronic or other health record, Independently interpreting results (not separately reported) and communicating results to the patient/family/caregiver, or Care coordination (not separately reported).         Each patient to whom he or she provides medical services by telemedicine is:  (1) informed of the relationship between the physician and patient and the respective role of any other health care provider with respect to management of the patient; and (2) notified that he or she may decline to receive medical services by telemedicine and may withdraw from such care at any time.    Notes:   CHIEF COMPLAINT: Several days, dysuria, frequency, incomplete voiding sensation    HISTORY OF PRESENT ILLNESS: The patient presents with 5 days of dysuria, frequency, and a sensation of incomplete voiding.  There has been no hematuria.  The patient has no history of UTIs.  But she feels this is a UTI.  The patient has not noticed any vesicular outbreak.  There is no labial pain, which might be consistent with HSV infection.  Patient denies low back pain, fevers, chills, nausea or vomiting.  Patient has good p.o. intake.    The patient has a history of stable hypertension on current medications.  Patient denies chest pain or shortness of breath today.    REVIEW OF SYSTEMS:  GENERAL: No fever, chills, fatigability or weight loss.  SKIN: No rashes, itching or changes in color or texture of skin.  HEAD: No headaches or recent head trauma.  EYES: Visual acuity fine. No photophobia, ocular pain or diplopia.  EARS:  Denies ear pain, discharge or vertigo.  NOSE: No loss of smell, no epistaxis or postnasal drip.  MOUTH & THROAT: No hoarseness or change in voice. No excessive gum bleeding.  NODES: Denies swollen glands.  CHEST: Denies LAM, cyanosis, wheezing, cough and sputum production.  CARDIOVASCULAR: Denies chest pain, PND, orthopnea or reduced exercise tolerance.  ABDOMEN: Appetite fine. No weight loss. Denies diarrhea, abdominal pain, hematemesis or blood in stool.  URINARY: No flank pain or hematuria.  PERIPHERAL VASCULAR: No claudication or cyanosis.  MUSCULOSKELETAL: No joint stiffness or swelling. Denies back pain.  NEUROLOGIC: No history of seizures, paralysis, alteration of gait or coordination.    PHYSICAL EXAMINATION:   APPEARANCE: Well nourished, well developed, in no acute distress.    HEAD: Normocephalic, atraumatic.  EYES: PERRL. EOMI.  Conjunctivae without injection and  anicteric   NEUROLOGIC:       Normal speech development.      Hearing normal.  PSYCHIATRIC: Patient is alert and oriented x3.  Thought processes are all normal.  There is no homicidality.  There is no suicidality.  There is no evidence of psychosis.    LABORATORY/RADIOLOGY: Chart reviewed.    ASSESSMENT:   Urinary tract infection Sxs  HTN    PLAN:  Follow-up U Cx  Macrobid for 7 days  Patient will proceed to the emergency room if she develops fevers, chills, nausea, vomiting, or back pain.

## 2020-09-18 ENCOUNTER — HOSPITAL ENCOUNTER (OUTPATIENT)
Dept: RADIOLOGY | Facility: HOSPITAL | Age: 35
Discharge: HOME | End: 2020-09-18
Attending: INTERNAL MEDICINE
Payer: COMMERCIAL

## 2020-09-18 ENCOUNTER — TELEPHONE (OUTPATIENT)
Dept: CARDIOLOGY | Facility: CLINIC | Age: 35
End: 2020-09-18

## 2020-09-18 DIAGNOSIS — Z01.84 ANTIBODY RESPONSE EXAMINATION: ICD-10-CM

## 2020-09-18 DIAGNOSIS — I25.10 ATHEROSCLEROSIS OF NATIVE CORONARY ARTERY OF NATIVE HEART WITHOUT ANGINA PECTORIS: ICD-10-CM

## 2020-09-18 LAB — BACTERIA UR CULT: ABNORMAL

## 2020-09-18 PROCEDURE — 75571 CT HRT W/O DYE W/CA TEST: CPT | Mod: 26,,, | Performed by: RADIOLOGY

## 2020-09-18 PROCEDURE — 75571 CT HRT W/O DYE W/CA TEST: CPT | Mod: TC

## 2020-09-18 PROCEDURE — 75571 CT CALCIUM SCORING CARDIAC: ICD-10-PCS | Mod: 26,,, | Performed by: RADIOLOGY

## 2020-09-18 NOTE — TELEPHONE ENCOUNTER
Unable to reach pt. Sent [facility] message with results.     ----- Message from Mary Murrieta MD sent at 9/18/2020  9:45 AM CDT -----  Your calcium score is 0.risk of heart disease is very low.

## 2020-10-02 NOTE — PROGRESS NOTES
Digital Medicine: Health  Follow-Up    The history is provided by the patient.             Reason for review: Blood pressure not at goal        Topics Covered on Call: physical activity and Diet    Additional Follow-up details: Avg BP as of Oct 2, 2020 is 131/84.     No changes to diet or exercise.     Patient was at work so we kept conversation brief.           Diet-no change to diet    No change to diet.        Physical Activity-no change to routine  No change to exercise routine.     Medication Adherence-Medication adherence was assessed.      Substance, Sleep, Stress-Not assessed      Continue current diet/physical activity routine.       Addressed patient questions and patient has my contact information if needed prior to next outreach. Patient verbalizes understanding.      Explained the importance of self-monitoring and medication adherence. Encouraged the patient to communicate with their health  for lifestyle modifications to help improve or maintain a healthy lifestyle.            There are no preventive care reminders to display for this patient.    Last 5 Patient Entered Readings                                      Current 30 Day Average: 131/84     Recent Readings 9/23/2020 9/7/2020 9/2/2020 8/16/2020 7/22/2020    SBP (mmHg) 132 128 134 138 128    DBP (mmHg) 86 84 83 80 75    Pulse 85 76 82 83 75

## 2020-10-06 ENCOUNTER — PATIENT OUTREACH (OUTPATIENT)
Dept: ADMINISTRATIVE | Facility: OTHER | Age: 35
End: 2020-10-06

## 2020-10-07 ENCOUNTER — OFFICE VISIT (OUTPATIENT)
Dept: OBSTETRICS AND GYNECOLOGY | Facility: CLINIC | Age: 35
End: 2020-10-07
Payer: COMMERCIAL

## 2020-10-07 VITALS — HEIGHT: 61 IN | BODY MASS INDEX: 37.79 KG/M2

## 2020-10-07 DIAGNOSIS — Z01.419 VISIT FOR GYNECOLOGIC EXAMINATION: Primary | ICD-10-CM

## 2020-10-07 PROCEDURE — 99395 PR PREVENTIVE VISIT,EST,18-39: ICD-10-PCS | Mod: S$GLB,,, | Performed by: OBSTETRICS & GYNECOLOGY

## 2020-10-07 PROCEDURE — 99999 PR PBB SHADOW E&M-EST. PATIENT-LVL II: ICD-10-PCS | Mod: PBBFAC,,, | Performed by: OBSTETRICS & GYNECOLOGY

## 2020-10-07 PROCEDURE — 99999 PR PBB SHADOW E&M-EST. PATIENT-LVL II: CPT | Mod: PBBFAC,,, | Performed by: OBSTETRICS & GYNECOLOGY

## 2020-10-07 PROCEDURE — 99395 PREV VISIT EST AGE 18-39: CPT | Mod: S$GLB,,, | Performed by: OBSTETRICS & GYNECOLOGY

## 2020-10-07 NOTE — PROGRESS NOTES
"HISTORY OF PRESENT ILLNESS:    Meghna Aragon is a 35 y.o. female, , No LMP recorded.,  presents for a routine exam and has no complaints. NFP AND STILL TAKING A LOW DOSE OF PROCARDIA FOR HER HTN.  WOULD LOVE ANOTHER PREGNANCY, DISCUSSING WITH  AND STILL TAKING PNV.  COTEST PLANNED 2022:  post  6 weeks ago.  Her hospitalization was complicated BY SEVERE PRE-E REQUIRING READMISSION.  She is breastfeeding.  She desires oral progesterone-only contraceptive for contraception.  She does not postpartum depression.  SEES HER PCP IN 2 DAYS SO WILL STOP LABETALOL NOW SO SHE CAN CHECK BP OFF MEDS, DETERMINE WHETHER TO STOP ENTIRELY OR CHANGE  BABY ADMITTED WITH VIRUS ABOUT 1-2 WEEKS AGO, NOW BETTER.  FORTUNATELY, HAS 6 MORE WEEKS PRIOR TO RETURN TO WORK  20 , READMISSION PPD#5 SEV PRE-E.MALE, SECOND DEGREE  Her last pap was 2019  "PROLACTINOMA - STOP CABERGOLINE THROUGH PREGNANCY  CaM DELIV SON 2016, 3RD DEGREE 40W INDX.  O POS.  FIORICET PRN H/A  MFM SONOGRAPHER  . . NL MATERNATI XY - - -   2.3 CM RIGHT OV DERMOID.  33W5D 40th%ile,  INDX   GBS POS - GEST HTN"    Past Medical History:   Diagnosis Date    Anemia     Hyperprolactinemia 2017    Ovarian cyst 2016    dermoid on MRI       Past Surgical History:   Procedure Laterality Date    MOUTH SURGERY         MEDICATIONS AND ALLERGIES:      Current Outpatient Medications:     loratadine (CLARITIN) 10 mg tablet, Take 10 mg by mouth once daily., Disp: , Rfl:     PNV NO10/IRON FUM&P/FA/OMEGA-3 (PNV #10-IRON [id number] ORAL), Take by mouth., Disp: , Rfl:     NIFEdipine (PROCARDIA-XL) 30 MG (OSM) 24 hr tablet, Take 1 tablet (30 mg total) by mouth once daily., Disp: 30 tablet, Rfl: 3    Review of patient's allergies indicates:   Allergen Reactions    Codeine Itching       Family History   Problem Relation Age of Onset    Heart attacks under age 50 Father 44    Heart disease Father     Heart disease Paternal " Grandmother     Hypertension Mother     Thyroid disease Mother     Heart disease Paternal Aunt     Heart disease Paternal Uncle     No Known Problems Sister     No Known Problems Brother     No Known Problems Son     No Known Problems Brother     No Known Problems Brother     No Known Problems Brother     Cancer Neg Hx     Diabetes Neg Hx     Stroke Neg Hx     Thyroid cancer Neg Hx     Breast cancer Neg Hx     Colon cancer Neg Hx     Ovarian cancer Neg Hx        Social History     Socioeconomic History    Marital status:      Spouse name: Not on file    Number of children: Not on file    Years of education: Not on file    Highest education level: Not on file   Occupational History    Occupation: M sonographer    Social Needs    Financial resource strain: Not hard at all    Food insecurity     Worry: Never true     Inability: Never true    Transportation needs     Medical: No     Non-medical: No   Tobacco Use    Smoking status: Never Smoker    Smokeless tobacco: Never Used   Substance and Sexual Activity    Alcohol use: No     Frequency: 2-4 times a month     Drinks per session: 1 or 2     Binge frequency: Never    Drug use: No    Sexual activity: Yes     Partners: Male     Birth control/protection: None   Lifestyle    Physical activity     Days per week: 1 day     Minutes per session: 10 min    Stress: To some extent   Relationships    Social connections     Talks on phone: More than three times a week     Gets together: Once a week     Attends Judaism service: More than 4 times per year     Active member of club or organization: Yes     Attends meetings of clubs or organizations: 1 to 4 times per year     Relationship status:    Other Topics Concern    Not on file   Social History Narrative    Not on file       COMPREHENSIVE GYN HISTORY:  PAP History: Denies abnormal Paps.  Infection History: Denies STDs. Denies PID.  Benign History: Denies uterine fibroids.  "Denies ovarian cysts. Denies endometriosis. Denies other conditions.  Cancer History: Denies cervical cancer. Denies uterine cancer or hyperplasia. Denies ovarian cancer. Denies vulvar cancer or pre-cancer. Denies vaginal cancer or pre-cancer. Denies breast cancer. Denies colon cancer.  Sexual Activity History: Reports currently being sexually active  Menstrual History: Monthly, mild-moderate.  Contraception:natural family planning (NFP)    ROS:  GENERAL: No weight changes. No swelling. No fatigue. No fever.  CARDIOVASCULAR: No chest pain. No shortness of breath. No leg cramps.   NEUROLOGICAL: No headaches. No vision changes.  BREASTS: No pain. No lumps. No discharge.  ABDOMEN: No pain. No nausea. No vomiting. No diarrhea. No constipation.  REPRODUCTIVE: No abnormal bleeding.   VULVA: No pain. No lesions. No itching.  VAGINA: No relaxation. No itching. No odor. No discharge. No lesions.  URINARY: No incontinence. No nocturia. No frequency. No dysuria.    Ht 5' 1" (1.549 m)   BMI 37.79 kg/m²     PE:  APPEARANCE: Well nourished, well developed, in no acute distress.  AFFECT: WNL, alert and oriented x 3.  SKIN: No acne or hirsutism.  NECK: Neck symmetric, without masses or thyromegaly.  NODES: No inguinal, cervical, axillary or femoral lymph node enlargement.  CHEST: Good respiratory effort.   ABDOMEN: Soft. No tenderness or masses. No hepatosplenomegaly. No hernias.  BREASTS: Symmetrical, no skin changes, visible lesions, palpable masses or nipple discharge bilaterally.  PELVIC: External female genitalia without lesions.  Female hair distribution. Adequate perineal body, Normal urethral meatus. Vagina moist and well rugated without lesions or discharge.  No significant cystocele or rectocele present. Cervix pink without lesions, discharge or tenderness. Uterus is normal size, regular, mobile and nontender. Adnexa without masses or tenderness.  EXTREMITIES: No edema    PROCEDURES:      DIAGNOSIS:  1. Visit for " gynecologic examination         LABS AND TESTS ORDERED:    MEDICATIONS PRESCRIBED:    COUNSELING:   The patient was counseled today on ACS PAP guidelines, with recommendations for yearly pelvic exams unless their uterus, cervix, and ovaries were removed for benign reasons; in that case, examinations every 3-5 years are recommended.  The patient was also counseled regarding monthly breast self-examination, routine STD screening for at-risk populations, prophylactic immunizations for transmitted infections such as  HPV, Pertussis, or Influenza as appropriate, and yearly mammograms when indicated by ACS guidelines.  She was advised to see her primary care physician for all other health maintenance.    FOLLOW-UP with me annually.

## 2020-10-18 DIAGNOSIS — Z01.84 ANTIBODY RESPONSE EXAMINATION: ICD-10-CM

## 2020-11-05 ENCOUNTER — PATIENT MESSAGE (OUTPATIENT)
Dept: OTHER | Facility: OTHER | Age: 35
End: 2020-11-05

## 2020-11-06 ENCOUNTER — PATIENT OUTREACH (OUTPATIENT)
Dept: ADMINISTRATIVE | Facility: OTHER | Age: 35
End: 2020-11-06

## 2020-11-06 ENCOUNTER — OFFICE VISIT (OUTPATIENT)
Dept: CARDIOLOGY | Facility: CLINIC | Age: 35
End: 2020-11-06
Payer: COMMERCIAL

## 2020-11-06 VITALS
WEIGHT: 209.69 LBS | HEIGHT: 61 IN | HEART RATE: 95 BPM | DIASTOLIC BLOOD PRESSURE: 86 MMHG | SYSTOLIC BLOOD PRESSURE: 132 MMHG | BODY MASS INDEX: 39.59 KG/M2

## 2020-11-06 DIAGNOSIS — I10 HYPERTENSION, BENIGN: ICD-10-CM

## 2020-11-06 DIAGNOSIS — E66.9 OBESITY (BMI 35.0-39.9 WITHOUT COMORBIDITY): ICD-10-CM

## 2020-11-06 DIAGNOSIS — Z82.49 FAMILY HISTORY OF PREMATURE CAD: ICD-10-CM

## 2020-11-06 DIAGNOSIS — Z91.89 AT RISK FOR CORONARY ARTERY DISEASE: ICD-10-CM

## 2020-11-06 DIAGNOSIS — I10 ESSENTIAL HYPERTENSION: Primary | ICD-10-CM

## 2020-11-06 DIAGNOSIS — G44.221 CHRONIC TENSION-TYPE HEADACHE, INTRACTABLE: ICD-10-CM

## 2020-11-06 PROCEDURE — 99214 PR OFFICE/OUTPT VISIT, EST, LEVL IV, 30-39 MIN: ICD-10-PCS | Mod: S$GLB,,, | Performed by: INTERNAL MEDICINE

## 2020-11-06 PROCEDURE — 99214 OFFICE O/P EST MOD 30 MIN: CPT | Mod: S$GLB,,, | Performed by: INTERNAL MEDICINE

## 2020-11-06 RX ORDER — NIFEDIPINE 30 MG/1
30 TABLET, EXTENDED RELEASE ORAL DAILY
Qty: 90 TABLET | Refills: 3 | Status: SHIPPED | OUTPATIENT
Start: 2020-11-06 | End: 2020-11-06 | Stop reason: SDUPTHER

## 2020-11-06 RX ORDER — NIFEDIPINE 30 MG/1
30 TABLET, EXTENDED RELEASE ORAL DAILY
Qty: 90 TABLET | Refills: 3 | Status: SHIPPED | OUTPATIENT
Start: 2020-11-06 | End: 2021-09-24 | Stop reason: SDUPTHER

## 2020-11-06 RX ORDER — NIFEDIPINE 30 MG/1
30 TABLET, EXTENDED RELEASE ORAL DAILY
Qty: 90 TABLET | Refills: 1 | Status: SHIPPED | OUTPATIENT
Start: 2020-11-06 | End: 2020-11-06 | Stop reason: SDUPTHER

## 2020-11-06 NOTE — PROGRESS NOTES
"Subjective:   Patient ID:  Meghna Aragon is a 35 y.o. female who presents for follow-up of Hypertension in pregnancy, preeclampsia, severe, postpartum   Meghna Aragon is a 34 y.o. female is a new patient who presents for evaluation of Abnormal ECG     HPI:   preeclampsia second pregnancy. No HTN in first pregnancy.   End of second trimester patient develop HTN but was treated with medication. Patient was induced.   Her BP's remained mild range while in hospital, and did not require pushes/mag/meds. Patient presented to triage with headache and elevated blood pressures. She required labetalol 20/40 to control pressures and was started on magnesium. Chest Xray showed mild pulmonary edema for which she received one dose of lasix. She also complained of leg pain and had RLE doppler which was negative.      On discharge day patient is s/p magnesium and now on labetalol 400 BID (increased today from 200 BID yesterday). She is without symptoms and has been cleared by cardiology for discharge. We have messaged [facility] cardiology to assist with obtaining follow up appointment.      Patient had MI at 45. Grandmother had CABG, aunt, uncle have had CABG. Mother had HTN.     HPI:   Patient is doing well. BP has been running normal.   No chest pain, Orthopnea, PND of heart failure symptoms.   Denies palpitations or fluttering in the chest  Does not exercise   Taking BP meds      Patient Active Problem List   Diagnosis    Chronic tension headache    Obesity (BMI 35.0-39.9 without comorbidity)    Hyperprolactinemia    Thyroid nodule    Cyst, ovary, dermoid, right    Hypertension, benign     /86   Pulse 95   Ht 5' 1" (1.549 m)   Wt 95.1 kg (209 lb 10.5 oz)   BMI 39.61 kg/m²   Body mass index is 39.61 kg/m².  CrCl cannot be calculated (Patient's most recent lab result is older than the maximum 7 days allowed.).    Lab Results   Component Value Date     11/07/2019    K 4.4 11/07/2019     " 11/07/2019    CO2 27 11/07/2019    BUN 11 11/07/2019    CREATININE 0.9 11/07/2019    GLU 88 11/07/2019    HGBA1C 5.5 11/29/2019    MG 4.9 (HH) 08/29/2019    AST 24 11/07/2019    ALT 29 11/07/2019    ALBUMIN 4.1 11/07/2019    PROT 7.8 11/07/2019    BILITOT 0.3 11/07/2019    WBC 4.96 11/07/2019    HGB 11.6 (L) 11/07/2019    HCT 38.3 11/07/2019    MCV 86 11/07/2019     11/07/2019    TSH 1.408 11/07/2019    CHOL 216 (H) 05/18/2018    HDL 58 05/18/2018    LDLCALC 136.6 05/18/2018    TRIG 107 05/18/2018       Current Outpatient Medications   Medication Sig    loratadine (CLARITIN) 10 mg tablet Take 10 mg by mouth once daily.    PNV NO10/IRON FUM&P/FA/OMEGA-3 (PNV #10-IRON [id number] ORAL) Take by mouth.    NIFEdipine (PROCARDIA-XL) 30 MG (OSM) 24 hr tablet Take 1 tablet (30 mg total) by mouth once daily.     No current facility-administered medications for this visit.        Review of Systems   Constitution: Negative for chills, decreased appetite, malaise/fatigue, night sweats, weight gain and weight loss.   Eyes: Negative for blurred vision, double vision, visual disturbance and visual halos.   Cardiovascular: Negative for chest pain, claudication, cyanosis, dyspnea on exertion, irregular heartbeat, leg swelling, near-syncope, orthopnea, palpitations, paroxysmal nocturnal dyspnea and syncope.   Respiratory: Negative for cough, hemoptysis, snoring, sputum production and wheezing.    Endocrine: Negative for cold intolerance, heat intolerance, polydipsia and polyphagia.   Hematologic/Lymphatic: Negative for adenopathy and bleeding problem. Does not bruise/bleed easily.   Skin: Negative for flushing, itching, poor wound healing and rash.   Musculoskeletal: Negative for arthritis, back pain, falls, gout, joint pain, joint swelling, muscle cramps, muscle weakness, myalgias, neck pain and stiffness.   Gastrointestinal: Negative for bloating, abdominal pain, anorexia, diarrhea, dysphagia, excessive appetite,  flatus, hematemesis, jaundice, melena and nausea.   Genitourinary: Negative for hesitancy and incomplete emptying.   Neurological: Negative for aphonia, brief paralysis, difficulty with concentration, disturbances in coordination, excessive daytime sleepiness, dizziness, focal weakness, light-headedness, loss of balance and weakness.   Psychiatric/Behavioral: Negative for altered mental status, depression, hallucinations, hypervigilance, memory loss, substance abuse and suicidal ideas. The patient does not have insomnia and is not nervous/anxious.        Objective:   Physical Exam   Constitutional: She is oriented to person, place, and time. She appears well-developed and well-nourished. No distress.   HENT:   Head: Normocephalic and atraumatic.   Nose: Nose normal.   Mouth/Throat: Oropharynx is clear and moist. No oropharyngeal exudate.   Eyes: Pupils are equal, round, and reactive to light. Conjunctivae and EOM are normal. Right eye exhibits no discharge. Left eye exhibits no discharge. No scleral icterus.   Neck: Normal range of motion. Neck supple. No JVD present. No tracheal deviation present. No thyromegaly present.   Cardiovascular: Normal rate, regular rhythm, normal heart sounds and intact distal pulses. Exam reveals no gallop and no friction rub.   No murmur heard.  Pulmonary/Chest: Effort normal and breath sounds normal. No stridor. No respiratory distress. She has no wheezes. She has no rales. She exhibits no tenderness.   Abdominal: Soft. Bowel sounds are normal. She exhibits no distension and no mass. There is no abdominal tenderness. There is no rebound and no guarding.   Musculoskeletal: Normal range of motion.         General: No tenderness or edema.   Lymphadenopathy:     She has no cervical adenopathy.   Neurological: She is alert and oriented to person, place, and time. She has normal reflexes. No cranial nerve deficit. She exhibits normal muscle tone. Coordination normal.   Skin: Skin is warm.  No rash noted. She is not diaphoretic. No erythema. No pallor.   Psychiatric: She has a normal mood and affect. Her behavior is normal. Judgment and thought content normal.       Assessment:     1. Essential hypertension    2. Hypertension, benign    3. Obesity (BMI 35.0-39.9 without comorbidity)    4. Chronic tension-type headache, intractable    5. At risk for coronary artery disease    6. Family history of premature CAD        Plan:     We discussed mediterranean diet and weight loss . Repeat labs, calcium score is 0. We can repeat in 5 yrs.   Meghna was seen today for hypertension in pregnancy, preeclampsia, severe, postpartum .    Diagnoses and all orders for this visit:    Essential hypertension  -     Lipid Panel; Future  -     Comprehensive Metabolic Panel; Future  -     CBC Auto Differential; Future    Hypertension, benign  -     Discontinue: NIFEdipine (PROCARDIA-XL) 30 MG (OSM) 24 hr tablet; Take 1 tablet (30 mg total) by mouth once daily.  -     NIFEdipine (PROCARDIA-XL) 30 MG (OSM) 24 hr tablet; Take 1 tablet (30 mg total) by mouth once daily.    Obesity (BMI 35.0-39.9 without comorbidity)    Chronic tension-type headache, intractable    At risk for coronary artery disease  -     CBC Auto Differential; Future    Family history of premature CAD  -     CBC Auto Differential; Future      RTC 1 yr

## 2020-11-06 NOTE — PROGRESS NOTES
Health Maintenance Due   Topic Date Due    Hepatitis C Screening  1985    Aspirin/Antiplatelet Therapy  04/30/2004    Influenza Vaccine (1) 08/01/2020     Updates were requested from care everywhere.  Chart was reviewed for overdue Proactive [facility] Encounters (JOHN) topics (CRS, Breast Cancer Screening, Eye exam)  Health Maintenance has been updated.  LINKS immunization registry triggered.  Immunizations were reconciled.

## 2020-11-06 NOTE — PROGRESS NOTES
Digital Medicine: Clinician Follow-Up    Patient sent a message requesting a refill of BP medication.   Called patient to discuss.     Doing well. Checking BP infrequently, but it remains near goal. Has been out of medication for a couple of weeks.     The history is provided by the patient.      Review of patient's allergies indicates:   -- Codeine -- Itching  Follow-up reason(s): addressing patient questions/concerns and routine follow up.     Patient is not experiencing signs/symptoms of hypotension.  Patient is not experiencing signs/symptoms of hypertension.            Last 5 Patient Entered Readings                                      Current 30 Day Average: 132/82     Recent Readings 10/10/2020 10/7/2020 9/23/2020 9/7/2020 9/2/2020    SBP (mmHg) 135 129 132 128 134    DBP (mmHg) 83 81 86 84 83    Pulse 83 86 85 76 82                 Depression Screening  Did not address depression screening.    Sleep Apnea Screening    Did not address sleep apnea screening.     Medication Affordability Screening  Patient did not answer the medication affordability questionnaires. Patient is currently not having problems affording medications    Medication Adherence-Medication adherence was assessed.          ASSESSMENT(S)  Patients BP average is 132/82 mmHg, which is above goal. Patient's BP goal is less than or equal to 130/80.     Hypertension Plan  Additional monitoring needed. 2-3 readings per week requested.   Continue current therapy.  Continue current diet/physical activity routine.       Addressed patient questions and patient has my contact information if needed prior to next outreach. Patient verbalizes understanding.      Explained the importance of self-monitoring and medication adherence. Encouraged the patient to communicate with their health  for lifestyle modifications to help improve or maintain a healthy lifestyle.               There are no preventive care reminders to display for this  patient.  There are no preventive care reminders to display for this patient.      Hypertension Medications             NIFEdipine (PROCARDIA-XL) 30 MG (OSM) 24 hr tablet Take 1 tablet (30 mg total) by mouth once daily.

## 2020-11-13 ENCOUNTER — PATIENT OUTREACH (OUTPATIENT)
Dept: OTHER | Facility: OTHER | Age: 35
End: 2020-11-13

## 2020-11-13 NOTE — PROGRESS NOTES
Digital Medicine: Health  Follow-Up    The history is provided by the patient.             Reason for review: Blood pressure not at goal        Topics Covered on Call: physical activity and Diet    Additional Follow-up details: Avg BP as of Nov 13, 2020 is 132/87.     No changes noted to diet or exercise. Stated she felt good.               Diet-no change to diet    No change to diet.        Physical Activity-no change to routine  No change to exercise routine.     Medication Adherence-Medication Adherence not addressed.        Substance, Sleep, Stress-No change  stress-  Details:  Intervention(s):    Sleep-  Details:  Intervention(s):    Alcohol -  Details:  Intervention(s):    Tobacco-  Details:  Intervention(s):          Continue current diet/physical activity routine.       Addressed patient questions and patient has my contact information if needed prior to next outreach. Patient verbalizes understanding.      Explained the importance of self-monitoring and medication adherence. Encouraged the patient to communicate with their health  for lifestyle modifications to help improve or maintain a healthy lifestyle.               There are no preventive care reminders to display for this patient.      Last 5 Patient Entered Readings                                      Current 30 Day Average: 132/87     Recent Readings 11/8/2020 11/7/2020 10/10/2020 10/7/2020 9/23/2020    SBP (mmHg) 125 138 135 129 132    DBP (mmHg) 80 93 83 81 86    Pulse 74 77 83 86 85

## 2020-11-17 DIAGNOSIS — Z01.84 ANTIBODY RESPONSE EXAMINATION: ICD-10-CM

## 2020-11-20 ENCOUNTER — LAB VISIT (OUTPATIENT)
Dept: LAB | Facility: OTHER | Age: 35
End: 2020-11-20
Attending: INTERNAL MEDICINE
Payer: COMMERCIAL

## 2020-11-20 DIAGNOSIS — Z82.49 FAMILY HISTORY OF PREMATURE CAD: ICD-10-CM

## 2020-11-20 DIAGNOSIS — I10 ESSENTIAL HYPERTENSION: ICD-10-CM

## 2020-11-20 DIAGNOSIS — Z91.89 AT RISK FOR CORONARY ARTERY DISEASE: ICD-10-CM

## 2020-11-20 LAB
ALBUMIN SERPL BCP-MCNC: 3.8 G/DL (ref 3.5–5.2)
ALP SERPL-CCNC: 60 U/L (ref 55–135)
ALT SERPL W/O P-5'-P-CCNC: 13 U/L (ref 10–44)
ANION GAP SERPL CALC-SCNC: 8 MMOL/L (ref 8–16)
AST SERPL-CCNC: 12 U/L (ref 10–40)
BASOPHILS # BLD AUTO: 0.02 K/UL (ref 0–0.2)
BASOPHILS NFR BLD: 0.3 % (ref 0–1.9)
BILIRUB SERPL-MCNC: 0.2 MG/DL (ref 0.1–1)
BUN SERPL-MCNC: 9 MG/DL (ref 6–20)
CALCIUM SERPL-MCNC: 9 MG/DL (ref 8.7–10.5)
CHLORIDE SERPL-SCNC: 105 MMOL/L (ref 95–110)
CHOLEST SERPL-MCNC: 224 MG/DL (ref 120–199)
CHOLEST/HDLC SERPL: 3.9 {RATIO} (ref 2–5)
CO2 SERPL-SCNC: 25 MMOL/L (ref 23–29)
CREAT SERPL-MCNC: 0.8 MG/DL (ref 0.5–1.4)
DIFFERENTIAL METHOD: ABNORMAL
EOSINOPHIL # BLD AUTO: 0.1 K/UL (ref 0–0.5)
EOSINOPHIL NFR BLD: 1.7 % (ref 0–8)
ERYTHROCYTE [DISTWIDTH] IN BLOOD BY AUTOMATED COUNT: 13.8 % (ref 11.5–14.5)
EST. GFR  (AFRICAN AMERICAN): >60 ML/MIN/1.73 M^2
EST. GFR  (NON AFRICAN AMERICAN): >60 ML/MIN/1.73 M^2
GLUCOSE SERPL-MCNC: 97 MG/DL (ref 70–110)
HCT VFR BLD AUTO: 36.8 % (ref 37–48.5)
HDLC SERPL-MCNC: 58 MG/DL (ref 40–75)
HDLC SERPL: 25.9 % (ref 20–50)
HGB BLD-MCNC: 11.5 G/DL (ref 12–16)
IMM GRANULOCYTES # BLD AUTO: 0.02 K/UL (ref 0–0.04)
IMM GRANULOCYTES NFR BLD AUTO: 0.3 % (ref 0–0.5)
LDLC SERPL CALC-MCNC: 128.4 MG/DL (ref 63–159)
LYMPHOCYTES # BLD AUTO: 1.7 K/UL (ref 1–4.8)
LYMPHOCYTES NFR BLD: 28.8 % (ref 18–48)
MCH RBC QN AUTO: 26.8 PG (ref 27–31)
MCHC RBC AUTO-ENTMCNC: 31.3 G/DL (ref 32–36)
MCV RBC AUTO: 86 FL (ref 82–98)
MONOCYTES # BLD AUTO: 0.5 K/UL (ref 0.3–1)
MONOCYTES NFR BLD: 8.4 % (ref 4–15)
NEUTROPHILS # BLD AUTO: 3.7 K/UL (ref 1.8–7.7)
NEUTROPHILS NFR BLD: 60.5 % (ref 38–73)
NONHDLC SERPL-MCNC: 166 MG/DL
NRBC BLD-RTO: 0 /100 WBC
PLATELET # BLD AUTO: 241 K/UL (ref 150–350)
PMV BLD AUTO: 11.8 FL (ref 9.2–12.9)
POTASSIUM SERPL-SCNC: 4 MMOL/L (ref 3.5–5.1)
PROT SERPL-MCNC: 7 G/DL (ref 6–8.4)
RBC # BLD AUTO: 4.29 M/UL (ref 4–5.4)
SODIUM SERPL-SCNC: 138 MMOL/L (ref 136–145)
TRIGL SERPL-MCNC: 188 MG/DL (ref 30–150)
WBC # BLD AUTO: 6.05 K/UL (ref 3.9–12.7)

## 2020-11-20 PROCEDURE — 80053 COMPREHEN METABOLIC PANEL: CPT

## 2020-11-20 PROCEDURE — 80061 LIPID PANEL: CPT

## 2020-11-20 PROCEDURE — 36415 COLL VENOUS BLD VENIPUNCTURE: CPT

## 2020-11-20 PROCEDURE — 85025 COMPLETE CBC W/AUTO DIFF WBC: CPT

## 2020-11-27 ENCOUNTER — TELEPHONE (OUTPATIENT)
Dept: CARDIOLOGY | Facility: CLINIC | Age: 35
End: 2020-11-27

## 2020-11-27 DIAGNOSIS — E78.00 HYPERCHOLESTEREMIA: Primary | ICD-10-CM

## 2020-11-27 RX ORDER — ROSUVASTATIN CALCIUM 10 MG/1
10 TABLET, COATED ORAL NIGHTLY
Qty: 90 TABLET | Refills: 3 | Status: SHIPPED | OUTPATIENT
Start: 2020-11-27 | End: 2020-12-02 | Stop reason: SDUPTHER

## 2020-11-27 NOTE — TELEPHONE ENCOUNTER
Your cholesterol levels are still high.     I am starting you on a cholesterol lowering medicine. Repeat lipid level and liver test in 6 wks after starting the medication, you may experience mild muscle aches that usually improve with time,. If there are persistent sx please call.     I will gradually increase the dose depending on the result in 6 wks.

## 2020-12-02 ENCOUNTER — PATIENT MESSAGE (OUTPATIENT)
Dept: CARDIOLOGY | Facility: CLINIC | Age: 35
End: 2020-12-02

## 2020-12-02 RX ORDER — ROSUVASTATIN CALCIUM 10 MG/1
10 TABLET, COATED ORAL NIGHTLY
Qty: 90 TABLET | Refills: 3 | Status: SHIPPED | OUTPATIENT
Start: 2020-12-02 | End: 2021-09-24

## 2020-12-15 NOTE — PROGRESS NOTES
LABOR NOTE    S:  Complaints: No.  Epidural working:  not applicable      O: BP (!) 141/70   Pulse 67   Temp 97.1 °F (36.2 °C) (Temporal)   Resp 16   LMP 2018   SpO2 99%   Breastfeeding? No       FHT: 140, min to mod, no accels no decels Cat 2 (reassuring)  CTX: q 2-5 minutes  SVE: 350/-2 @ 2330       ASSESSMENT:   34 y.o.  at 37w5d, present for IOL for gHTN    FHT reassuring    Active Hospital Problems    Diagnosis  POA    Normal labor and delivery [O80]  Not Applicable      Resolved Hospital Problems   No resolved problems to display.     Course:  945: 50/-2  1130: 350/-2, pit at 6  0200: 3/60/-2, pit at 10    PLAN:    Continue Close Maternal/Fetal Monitoring  Pitocin Augmentation per protocol  Recheck 2 hours or PRN  Discussed that rupture would hopefully decrease her labor and allow for dilation. Patient is very anxious about needles and does not want to be ruptured without an epidural, because she is worried about the plastic tool to rupture the waterbag. However, she is also worried about the epidural needle.  We discussed trying to ROM at the next check in 2 hours.   Pt said she would think about ROM vs epidural during this time.    Majo Velasco MD  OBGYN PGY-1       [facility]

## 2020-12-18 ENCOUNTER — PATIENT OUTREACH (OUTPATIENT)
Dept: OTHER | Facility: OTHER | Age: 35
End: 2020-12-18

## 2021-01-07 ENCOUNTER — LAB VISIT (OUTPATIENT)
Dept: LAB | Facility: HOSPITAL | Age: 36
End: 2021-01-07
Attending: INTERNAL MEDICINE
Payer: COMMERCIAL

## 2021-01-07 DIAGNOSIS — E78.00 HYPERCHOLESTEREMIA: ICD-10-CM

## 2021-01-07 LAB
ALBUMIN SERPL BCP-MCNC: 4 G/DL (ref 3.5–5.2)
ALP SERPL-CCNC: 68 U/L (ref 55–135)
ALT SERPL W/O P-5'-P-CCNC: 15 U/L (ref 10–44)
AST SERPL-CCNC: 16 U/L (ref 10–40)
BILIRUB DIRECT SERPL-MCNC: 0.1 MG/DL (ref 0.1–0.3)
BILIRUB SERPL-MCNC: 0.2 MG/DL (ref 0.1–1)
CHOLEST SERPL-MCNC: 164 MG/DL (ref 120–199)
CHOLEST/HDLC SERPL: 3.4 {RATIO} (ref 2–5)
HDLC SERPL-MCNC: 48 MG/DL (ref 40–75)
HDLC SERPL: 29.3 % (ref 20–50)
LDLC SERPL CALC-MCNC: 95.2 MG/DL (ref 63–159)
NONHDLC SERPL-MCNC: 116 MG/DL
PROT SERPL-MCNC: 7.4 G/DL (ref 6–8.4)
TRIGL SERPL-MCNC: 104 MG/DL (ref 30–150)

## 2021-01-07 PROCEDURE — 80061 LIPID PANEL: CPT

## 2021-01-07 PROCEDURE — 36415 COLL VENOUS BLD VENIPUNCTURE: CPT

## 2021-01-07 PROCEDURE — 80076 HEPATIC FUNCTION PANEL: CPT

## 2021-01-22 ENCOUNTER — PATIENT MESSAGE (OUTPATIENT)
Dept: ADMINISTRATIVE | Facility: OTHER | Age: 36
End: 2021-01-22

## 2021-02-11 ENCOUNTER — IMMUNIZATION (OUTPATIENT)
Dept: PHARMACY | Facility: CLINIC | Age: 36
End: 2021-02-11
Payer: COMMERCIAL

## 2021-02-11 DIAGNOSIS — Z23 NEED FOR VACCINATION: Primary | ICD-10-CM

## 2021-02-18 ENCOUNTER — TELEPHONE (OUTPATIENT)
Dept: CARDIOLOGY | Facility: CLINIC | Age: 36
End: 2021-02-18

## 2021-03-11 ENCOUNTER — IMMUNIZATION (OUTPATIENT)
Dept: PHARMACY | Facility: CLINIC | Age: 36
End: 2021-03-11
Payer: COMMERCIAL

## 2021-03-11 DIAGNOSIS — Z23 NEED FOR VACCINATION: Primary | ICD-10-CM

## 2021-08-02 ENCOUNTER — PATIENT MESSAGE (OUTPATIENT)
Dept: CARDIOLOGY | Facility: CLINIC | Age: 36
End: 2021-08-02

## 2021-08-23 ENCOUNTER — NURSE TRIAGE (OUTPATIENT)
Dept: ADMINISTRATIVE | Facility: CLINIC | Age: 36
End: 2021-08-23

## 2021-08-24 ENCOUNTER — LAB VISIT (OUTPATIENT)
Dept: INTERNAL MEDICINE | Facility: CLINIC | Age: 36
End: 2021-08-24

## 2021-08-24 DIAGNOSIS — R09.81 NASAL CONGESTION: ICD-10-CM

## 2021-08-24 DIAGNOSIS — J02.9 SORE THROAT: Primary | ICD-10-CM

## 2021-08-24 DIAGNOSIS — J02.9 SORE THROAT: ICD-10-CM

## 2021-08-24 LAB — SARS-COV-2 RDRP RESP QL NAA+PROBE: NEGATIVE

## 2021-08-24 PROCEDURE — U0002 COVID-19 LAB TEST NON-CDC: HCPCS | Performed by: PREVENTIVE MEDICINE

## 2021-08-25 ENCOUNTER — PATIENT MESSAGE (OUTPATIENT)
Dept: INTERNAL MEDICINE | Facility: CLINIC | Age: 36
End: 2021-08-25

## 2021-09-24 ENCOUNTER — OFFICE VISIT (OUTPATIENT)
Dept: INTERNAL MEDICINE | Facility: CLINIC | Age: 36
End: 2021-09-24
Attending: INTERNAL MEDICINE
Payer: COMMERCIAL

## 2021-09-24 VITALS
BODY MASS INDEX: 40.08 KG/M2 | HEART RATE: 80 BPM | HEIGHT: 61 IN | WEIGHT: 212.31 LBS | SYSTOLIC BLOOD PRESSURE: 132 MMHG | OXYGEN SATURATION: 99 % | DIASTOLIC BLOOD PRESSURE: 86 MMHG

## 2021-09-24 DIAGNOSIS — I10 HYPERTENSION, BENIGN: ICD-10-CM

## 2021-09-24 DIAGNOSIS — Z00.00 ANNUAL PHYSICAL EXAM: Primary | ICD-10-CM

## 2021-09-24 DIAGNOSIS — Z11.59 ENCOUNTER FOR HEPATITIS C SCREENING TEST FOR LOW RISK PATIENT: ICD-10-CM

## 2021-09-24 DIAGNOSIS — E04.1 THYROID NODULE: ICD-10-CM

## 2021-09-24 DIAGNOSIS — E66.01 SEVERE OBESITY (BMI >= 40): ICD-10-CM

## 2021-09-24 DIAGNOSIS — E22.1 HYPERPROLACTINEMIA: ICD-10-CM

## 2021-09-24 PROBLEM — E66.9 OBESITY (BMI 35.0-39.9 WITHOUT COMORBIDITY): Status: RESOLVED | Noted: 2017-05-19 | Resolved: 2021-09-24

## 2021-09-24 PROCEDURE — 99395 PREV VISIT EST AGE 18-39: CPT | Mod: S$GLB,,, | Performed by: INTERNAL MEDICINE

## 2021-09-24 PROCEDURE — 99999 PR PBB SHADOW E&M-EST. PATIENT-LVL III: ICD-10-PCS | Mod: PBBFAC,,, | Performed by: INTERNAL MEDICINE

## 2021-09-24 PROCEDURE — 99999 PR PBB SHADOW E&M-EST. PATIENT-LVL III: CPT | Mod: PBBFAC,,, | Performed by: INTERNAL MEDICINE

## 2021-09-24 PROCEDURE — 99395 PR PREVENTIVE VISIT,EST,18-39: ICD-10-PCS | Mod: S$GLB,,, | Performed by: INTERNAL MEDICINE

## 2021-09-24 RX ORDER — UBIDECARENONE 50 MG
1200 CAPSULE ORAL DAILY
COMMUNITY

## 2021-09-24 RX ORDER — NIFEDIPINE 30 MG/1
30 TABLET, EXTENDED RELEASE ORAL DAILY
Qty: 90 TABLET | Refills: 3 | Status: SHIPPED | OUTPATIENT
Start: 2021-09-24 | End: 2022-08-29 | Stop reason: SDUPTHER

## 2021-09-26 PROBLEM — E66.01 SEVERE OBESITY (BMI >= 40): Status: ACTIVE | Noted: 2021-09-26

## 2021-10-23 ENCOUNTER — LAB VISIT (OUTPATIENT)
Dept: LAB | Facility: HOSPITAL | Age: 36
End: 2021-10-23
Attending: INTERNAL MEDICINE
Payer: COMMERCIAL

## 2021-10-23 DIAGNOSIS — E22.1 HYPERPROLACTINEMIA: ICD-10-CM

## 2021-10-23 DIAGNOSIS — Z11.59 ENCOUNTER FOR HEPATITIS C SCREENING TEST FOR LOW RISK PATIENT: ICD-10-CM

## 2021-10-23 DIAGNOSIS — Z00.00 ANNUAL PHYSICAL EXAM: ICD-10-CM

## 2021-10-23 LAB
ALBUMIN SERPL BCP-MCNC: 3.7 G/DL (ref 3.5–5.2)
ALP SERPL-CCNC: 71 U/L (ref 55–135)
ALT SERPL W/O P-5'-P-CCNC: 13 U/L (ref 10–44)
ANION GAP SERPL CALC-SCNC: 6 MMOL/L (ref 8–16)
AST SERPL-CCNC: 12 U/L (ref 10–40)
BASOPHILS # BLD AUTO: 0.02 K/UL (ref 0–0.2)
BASOPHILS NFR BLD: 0.3 % (ref 0–1.9)
BILIRUB SERPL-MCNC: 0.1 MG/DL (ref 0.1–1)
BUN SERPL-MCNC: 11 MG/DL (ref 6–20)
CALCIUM SERPL-MCNC: 8.9 MG/DL (ref 8.7–10.5)
CHLORIDE SERPL-SCNC: 107 MMOL/L (ref 95–110)
CHOLEST SERPL-MCNC: 232 MG/DL (ref 120–199)
CHOLEST/HDLC SERPL: 4.7 {RATIO} (ref 2–5)
CO2 SERPL-SCNC: 26 MMOL/L (ref 23–29)
CREAT SERPL-MCNC: 0.8 MG/DL (ref 0.5–1.4)
DIFFERENTIAL METHOD: ABNORMAL
EOSINOPHIL # BLD AUTO: 0.1 K/UL (ref 0–0.5)
EOSINOPHIL NFR BLD: 1.1 % (ref 0–8)
ERYTHROCYTE [DISTWIDTH] IN BLOOD BY AUTOMATED COUNT: 13.8 % (ref 11.5–14.5)
EST. GFR  (AFRICAN AMERICAN): >60 ML/MIN/1.73 M^2
EST. GFR  (NON AFRICAN AMERICAN): >60 ML/MIN/1.73 M^2
ESTIMATED AVG GLUCOSE: 117 MG/DL (ref 68–131)
FERRITIN SERPL-MCNC: 25 NG/ML (ref 20–300)
GLUCOSE SERPL-MCNC: 103 MG/DL (ref 70–110)
HBA1C MFR BLD: 5.7 % (ref 4–5.6)
HCT VFR BLD AUTO: 37.1 % (ref 37–48.5)
HDLC SERPL-MCNC: 49 MG/DL (ref 40–75)
HDLC SERPL: 21.1 % (ref 20–50)
HGB BLD-MCNC: 11.6 G/DL (ref 12–16)
IMM GRANULOCYTES # BLD AUTO: 0.02 K/UL (ref 0–0.04)
IMM GRANULOCYTES NFR BLD AUTO: 0.3 % (ref 0–0.5)
IRON SERPL-MCNC: 37 UG/DL (ref 30–160)
LDLC SERPL CALC-MCNC: 163 MG/DL (ref 63–159)
LYMPHOCYTES # BLD AUTO: 1.7 K/UL (ref 1–4.8)
LYMPHOCYTES NFR BLD: 26.6 % (ref 18–48)
MCH RBC QN AUTO: 26.6 PG (ref 27–31)
MCHC RBC AUTO-ENTMCNC: 31.3 G/DL (ref 32–36)
MCV RBC AUTO: 85 FL (ref 82–98)
MONOCYTES # BLD AUTO: 0.5 K/UL (ref 0.3–1)
MONOCYTES NFR BLD: 7.6 % (ref 4–15)
NEUTROPHILS # BLD AUTO: 4.1 K/UL (ref 1.8–7.7)
NEUTROPHILS NFR BLD: 64.1 % (ref 38–73)
NONHDLC SERPL-MCNC: 183 MG/DL
NRBC BLD-RTO: 0 /100 WBC
PLATELET # BLD AUTO: 278 K/UL (ref 150–450)
PMV BLD AUTO: 11.4 FL (ref 9.2–12.9)
POTASSIUM SERPL-SCNC: 3.9 MMOL/L (ref 3.5–5.1)
PROLACTIN SERPL IA-MCNC: 37.7 NG/ML (ref 5.2–26.5)
PROT SERPL-MCNC: 7.1 G/DL (ref 6–8.4)
RBC # BLD AUTO: 4.36 M/UL (ref 4–5.4)
SATURATED IRON: 9 % (ref 20–50)
SODIUM SERPL-SCNC: 139 MMOL/L (ref 136–145)
TOTAL IRON BINDING CAPACITY: 422 UG/DL (ref 250–450)
TRANSFERRIN SERPL-MCNC: 285 MG/DL (ref 200–375)
TRIGL SERPL-MCNC: 100 MG/DL (ref 30–150)
TSH SERPL DL<=0.005 MIU/L-ACNC: 0.91 UIU/ML (ref 0.4–4)
WBC # BLD AUTO: 6.44 K/UL (ref 3.9–12.7)

## 2021-10-23 PROCEDURE — 80061 LIPID PANEL: CPT | Performed by: INTERNAL MEDICINE

## 2021-10-23 PROCEDURE — 84443 ASSAY THYROID STIM HORMONE: CPT | Performed by: INTERNAL MEDICINE

## 2021-10-23 PROCEDURE — 82728 ASSAY OF FERRITIN: CPT | Performed by: INTERNAL MEDICINE

## 2021-10-23 PROCEDURE — 86803 HEPATITIS C AB TEST: CPT | Performed by: INTERNAL MEDICINE

## 2021-10-23 PROCEDURE — 83036 HEMOGLOBIN GLYCOSYLATED A1C: CPT | Performed by: INTERNAL MEDICINE

## 2021-10-23 PROCEDURE — 85025 COMPLETE CBC W/AUTO DIFF WBC: CPT | Performed by: INTERNAL MEDICINE

## 2021-10-23 PROCEDURE — 84146 ASSAY OF PROLACTIN: CPT | Performed by: INTERNAL MEDICINE

## 2021-10-23 PROCEDURE — 84466 ASSAY OF TRANSFERRIN: CPT | Performed by: INTERNAL MEDICINE

## 2021-10-23 PROCEDURE — 36415 COLL VENOUS BLD VENIPUNCTURE: CPT | Performed by: INTERNAL MEDICINE

## 2021-10-23 PROCEDURE — 80053 COMPREHEN METABOLIC PANEL: CPT | Performed by: INTERNAL MEDICINE

## 2021-10-25 LAB — HCV AB SERPL QL IA: NEGATIVE

## 2021-11-12 ENCOUNTER — HOSPITAL ENCOUNTER (OUTPATIENT)
Dept: RADIOLOGY | Facility: OTHER | Age: 36
Discharge: HOME | End: 2021-11-12
Attending: INTERNAL MEDICINE
Payer: COMMERCIAL

## 2021-11-12 DIAGNOSIS — E04.1 THYROID NODULE: ICD-10-CM

## 2021-11-12 PROCEDURE — 76536 US EXAM OF HEAD AND NECK: CPT | Mod: 26,,, | Performed by: INTERNAL MEDICINE

## 2021-11-12 PROCEDURE — 76536 US EXAM OF HEAD AND NECK: CPT | Mod: TC

## 2021-11-12 PROCEDURE — 76536 US SOFT TISSUE HEAD NECK THYROID: ICD-10-PCS | Mod: 26,,, | Performed by: INTERNAL MEDICINE

## 2021-12-22 ENCOUNTER — OFFICE VISIT (OUTPATIENT)
Dept: ENDOCRINOLOGY | Facility: CLINIC | Age: 36
End: 2021-12-22
Payer: COMMERCIAL

## 2021-12-22 VITALS
OXYGEN SATURATION: 98 % | HEIGHT: 61 IN | DIASTOLIC BLOOD PRESSURE: 80 MMHG | HEART RATE: 85 BPM | WEIGHT: 215.38 LBS | RESPIRATION RATE: 16 BRPM | BODY MASS INDEX: 40.66 KG/M2 | SYSTOLIC BLOOD PRESSURE: 126 MMHG

## 2021-12-22 DIAGNOSIS — E22.1 HYPERPROLACTINEMIA: ICD-10-CM

## 2021-12-22 DIAGNOSIS — E04.1 THYROID NODULE: ICD-10-CM

## 2021-12-22 PROCEDURE — 99204 OFFICE O/P NEW MOD 45 MIN: CPT | Mod: S$GLB,,, | Performed by: INTERNAL MEDICINE

## 2021-12-22 PROCEDURE — 99999 PR PBB SHADOW E&M-EST. PATIENT-LVL III: ICD-10-PCS | Mod: PBBFAC,,, | Performed by: INTERNAL MEDICINE

## 2021-12-22 PROCEDURE — 99999 PR PBB SHADOW E&M-EST. PATIENT-LVL III: CPT | Mod: PBBFAC,,, | Performed by: INTERNAL MEDICINE

## 2021-12-22 PROCEDURE — 99204 PR OFFICE/OUTPT VISIT, NEW, LEVL IV, 45-59 MIN: ICD-10-PCS | Mod: S$GLB,,, | Performed by: INTERNAL MEDICINE

## 2021-12-22 RX ORDER — CABERGOLINE 0.5 MG/1
0.25 TABLET ORAL
Qty: 8 TABLET | Refills: 3 | Status: SHIPPED | OUTPATIENT
Start: 2021-12-23 | End: 2023-12-18 | Stop reason: SDUPTHER

## 2021-12-22 RX ORDER — FERROUS SULFATE 325(65) MG
325 TABLET ORAL
COMMUNITY

## 2022-01-04 ENCOUNTER — LAB VISIT (OUTPATIENT)
Dept: LAB | Facility: HOSPITAL | Age: 37
End: 2022-01-04
Attending: INTERNAL MEDICINE
Payer: COMMERCIAL

## 2022-01-04 DIAGNOSIS — E22.1 HYPERPROLACTINEMIA: ICD-10-CM

## 2022-01-04 PROCEDURE — 84305 ASSAY OF SOMATOMEDIN: CPT | Performed by: INTERNAL MEDICINE

## 2022-01-04 PROCEDURE — 84146 ASSAY OF PROLACTIN: CPT | Mod: 91 | Performed by: INTERNAL MEDICINE

## 2022-01-04 PROCEDURE — 36415 COLL VENOUS BLD VENIPUNCTURE: CPT | Mod: PO | Performed by: INTERNAL MEDICINE

## 2022-01-06 LAB
ANNOTATION COMMENT IMP: ABNORMAL
MACROPROLACTIN SERPL-MCNC: 41 NG/ML (ref 3.4–18.5)
MACROPROLACTIN/PROLACTIN MFR SERPL: 17 %
PROLACTIN SERPL IA-MCNC: 49.5 NG/ML (ref 4.8–23.3)

## 2022-01-10 LAB
IGF-I SERPL-MCNC: 92 NG/ML (ref 59–279)
IGF-I Z-SCORE SERPL: -1.04 SD

## 2022-01-27 ENCOUNTER — IMMUNIZATION (OUTPATIENT)
Dept: PHARMACY | Facility: CLINIC | Age: 37
End: 2022-01-27
Payer: COMMERCIAL

## 2022-01-27 DIAGNOSIS — Z23 NEED FOR VACCINATION: Primary | ICD-10-CM

## 2022-02-09 ENCOUNTER — PATIENT MESSAGE (OUTPATIENT)
Dept: ENDOCRINOLOGY | Facility: CLINIC | Age: 37
End: 2022-02-09
Payer: COMMERCIAL

## 2022-02-23 ENCOUNTER — TELEPHONE (OUTPATIENT)
Dept: OTOLARYNGOLOGY | Facility: CLINIC | Age: 37
End: 2022-02-23
Payer: COMMERCIAL

## 2022-03-03 ENCOUNTER — PATIENT MESSAGE (OUTPATIENT)
Dept: OTOLARYNGOLOGY | Facility: CLINIC | Age: 37
End: 2022-03-03
Payer: COMMERCIAL

## 2022-03-07 ENCOUNTER — PATIENT MESSAGE (OUTPATIENT)
Dept: OTOLARYNGOLOGY | Facility: CLINIC | Age: 37
End: 2022-03-07
Payer: COMMERCIAL

## 2022-03-08 NOTE — PROGRESS NOTES
Digital Medicine: Health  Introduction    Introduced Meghnafernanda Aragon to Digital Medicine. Discussed health  role and recommended lifestyle modifications.    The history is provided by the patient.     HYPERTENSION  Our goal is to get BP to consistently below 130/80mmHg and make the process convenient so patient can avoid extra trips to the office. Getting your blood pressure below 130/80mmHg (definition of control) will reduce your risk for heart attack, kidney failure, stroke and death (as well as kidney failure, eye disease, & dementia)      Reviewed that the Digital Medicine care team - consisting of a clinician and a health  - will follow the most current evidence-based national guidelines for treating your condition.  The health  will focus on lifestyle modifications and motivation while the clinician will focus on medication therapy.  The care team will review all data on a regular basis and reach out as needed.      Explained that one of the key parts of the program is communication with the care team.  Asked patient to respond to outreach attempts and complete questionnaires.  Stressed importance of medication adherence.    Explained that we expect patient to obtain several blood pressures per week at random times of day.  Instructed patient not to allow anyone else to use phone and monitoring device.  Confirmed appropriate BP monitoring technique.      Explained to patient that the digital medicine team is not available for emergencies.  Patient will call [facility] on-call ([phone] or [phone]) or [phone] if needed.      Patient's BP goal is 130/80.Patient's BP average is 147/87 mmHg, which is above goal, per 2017 ACC/AHA Hypertension Guidelines.          Last 5 Patient Entered Readings                                      Current 30 Day Average: 147/87     Recent Readings 11/13/2019 11/11/2019 11/10/2019 11/9/2019 11/8/2019    SBP (mmHg) 138 139 140 159 149    DBP (mmHg) 82 79 82 92  90    Pulse 69 71 73 73 69            Intervention/Plan    There are no preventive care reminders to display for this patient.    Reviewed the importance of self-monitoring, medication adherence, and that the health  can be used as a resource for lifestyle modifications to help reduce or maintain a healthy lifestyle.    Sent link to [facility] webpages and my contact information via [employer] for future questions. Follow up scheduled.         Screenings    SDOH   ROS: CONTUSIONAL: Denies fever, chills, fatigue, wt loss. HEAD: Denies trauma, HA, Dizziness. EYE: Denies Acute visual changes, diplopia. ENMT: Denies change in hearing, tinnitus, epistaxis, difficulty swallowing, sore throat. CARDIO: Denies CP, palpitations, edema. RESP: Denies Cough, SOB , Diff breathing, hemoptysis. GI: Denies N/V, ABD pain, change in bowel movement. URINARY: Denies difficulty urinating, pelvic pain. MS:  Denies joint pain, back pain, weakness, decreased ROM, swelling. NEURO: Denies change in gait, seizures, loss of sensation, dizziness, confusion LOC.  PSY: +verbal hallucination  NO SI/HI. SKIN: Denies Rash, bruising.

## 2022-03-15 ENCOUNTER — LAB VISIT (OUTPATIENT)
Dept: LAB | Facility: HOSPITAL | Age: 37
End: 2022-03-15
Attending: INTERNAL MEDICINE
Payer: COMMERCIAL

## 2022-03-15 DIAGNOSIS — E22.1 HYPERPROLACTINEMIA: ICD-10-CM

## 2022-03-15 PROCEDURE — 84146 ASSAY OF PROLACTIN: CPT | Performed by: INTERNAL MEDICINE

## 2022-03-15 PROCEDURE — 36415 COLL VENOUS BLD VENIPUNCTURE: CPT | Mod: PO | Performed by: INTERNAL MEDICINE

## 2022-03-17 LAB — PROLACTIN SERPL IA-MCNC: 4.9 NG/ML (ref 5.2–26.5)

## 2022-04-08 ENCOUNTER — TELEPHONE (OUTPATIENT)
Dept: OTOLARYNGOLOGY | Facility: CLINIC | Age: 37
End: 2022-04-08
Payer: COMMERCIAL

## 2022-04-08 NOTE — TELEPHONE ENCOUNTER
Contacted patient to get more info on upcoming visit. Pt stated she has narrow ear canals which is causing her trouble with cerumen build up and hearing loss. Added pt to audiology visit after seeing the doctor. Pt verbalized understanding and thanked me for calling.

## 2022-04-12 ENCOUNTER — OFFICE VISIT (OUTPATIENT)
Dept: OTOLARYNGOLOGY | Facility: CLINIC | Age: 37
End: 2022-04-12
Payer: COMMERCIAL

## 2022-04-12 ENCOUNTER — CLINICAL SUPPORT (OUTPATIENT)
Dept: OTOLARYNGOLOGY | Facility: CLINIC | Age: 37
End: 2022-04-12
Payer: COMMERCIAL

## 2022-04-12 VITALS
WEIGHT: 214.94 LBS | SYSTOLIC BLOOD PRESSURE: 133 MMHG | BODY MASS INDEX: 40.58 KG/M2 | HEIGHT: 61 IN | HEART RATE: 89 BPM | DIASTOLIC BLOOD PRESSURE: 90 MMHG

## 2022-04-12 DIAGNOSIS — J30.9 ALLERGIC RHINITIS, UNSPECIFIED SEASONALITY, UNSPECIFIED TRIGGER: Primary | Chronic | ICD-10-CM

## 2022-04-12 DIAGNOSIS — H69.93 DYSFUNCTION OF BOTH EUSTACHIAN TUBES: Chronic | ICD-10-CM

## 2022-04-12 DIAGNOSIS — H93.293 ABNORMAL AUDITORY PERCEPTION, BILATERAL: Primary | ICD-10-CM

## 2022-04-12 PROCEDURE — 99999 PR PBB SHADOW E&M-EST. PATIENT-LVL I: CPT | Mod: PBBFAC,,, | Performed by: AUDIOLOGIST

## 2022-04-12 PROCEDURE — 92556 PR SPEECH AUDIOMETRY, COMPLETE: ICD-10-PCS | Mod: S$GLB,,, | Performed by: AUDIOLOGIST

## 2022-04-12 PROCEDURE — 99204 OFFICE O/P NEW MOD 45 MIN: CPT | Mod: S$GLB,,, | Performed by: OTOLARYNGOLOGY

## 2022-04-12 PROCEDURE — 92552 PURE TONE AUDIOMETRY AIR: CPT | Mod: S$GLB,,, | Performed by: AUDIOLOGIST

## 2022-04-12 PROCEDURE — 99204 PR OFFICE/OUTPT VISIT, NEW, LEVL IV, 45-59 MIN: ICD-10-PCS | Mod: S$GLB,,, | Performed by: OTOLARYNGOLOGY

## 2022-04-12 PROCEDURE — 99999 PR PBB SHADOW E&M-EST. PATIENT-LVL I: ICD-10-PCS | Mod: PBBFAC,,, | Performed by: AUDIOLOGIST

## 2022-04-12 PROCEDURE — 92567 PR TYMPA2METRY: ICD-10-PCS | Mod: S$GLB,,, | Performed by: AUDIOLOGIST

## 2022-04-12 PROCEDURE — 99999 PR PBB SHADOW E&M-EST. PATIENT-LVL III: CPT | Mod: PBBFAC,,, | Performed by: OTOLARYNGOLOGY

## 2022-04-12 PROCEDURE — 92552 PR PURE TONE AUDIOMETRY, AIR: ICD-10-PCS | Mod: S$GLB,,, | Performed by: AUDIOLOGIST

## 2022-04-12 PROCEDURE — 99999 PR PBB SHADOW E&M-EST. PATIENT-LVL III: ICD-10-PCS | Mod: PBBFAC,,, | Performed by: OTOLARYNGOLOGY

## 2022-04-12 PROCEDURE — 92567 TYMPANOMETRY: CPT | Mod: S$GLB,,, | Performed by: AUDIOLOGIST

## 2022-04-12 PROCEDURE — 92556 SPEECH AUDIOMETRY COMPLETE: CPT | Mod: S$GLB,,, | Performed by: AUDIOLOGIST

## 2022-04-12 RX ORDER — TRIAMCINOLONE ACETONIDE 55 UG/1
2 SPRAY, METERED NASAL DAILY
Qty: 17 G | Refills: 5 | Status: SHIPPED | OUTPATIENT
Start: 2022-04-12

## 2022-04-12 NOTE — PROGRESS NOTES
"  Chief Complaint   Patient presents with    Cerumen Impaction     Possible bilateral impaction       HPI: Meghna Aragon is a 36 y.o. female who is self-referred for bilateral ear stuffiness",ear fullness and ear popping which has been present for a few months ago..  She reports the symptoms have been are gradual in onset.  She has been experiencing nasal congestion, post nasal drip, rhinorrhea and hearing loss.  The patient reports symptoms of allergic rhinitis/chronic rhinitis including congestions, postnasal drip, rhinorrhea, sneezing, itching, and sinus pressure.  She does have a history of allergic issues as a child, and she is not currently taking any consistent allergy medications.  She is able to get the ears to pop with Valsalva.    She also reports a history of having her ears cleaned of cerumen impaction as a child.  She has not had them cleaned recently and is not currently using any ear drops.      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    Tobacco Use    Smoking status: Never Smoker    Smokeless tobacco: Never Used   Substance and Sexual Activity    Alcohol use: No    Drug use: No    Sexual activity: Yes     Partners: Male     Birth control/protection: None     Past Surgical History:   Procedure Laterality Date    MOUTH SURGERY       Family History   Problem Relation Age of Onset    Heart attacks under age 50 Father 44    Heart disease Father     Heart disease Paternal Grandmother     Hypertension Mother     Thyroid disease Mother     Heart disease Paternal Aunt     Heart disease Paternal Uncle     No Known Problems Sister     No Known Problems Brother     No Known Problems Son     No Known Problems Brother     No Known Problems Brother     No Known Problems Brother "     Cancer Neg Hx     Diabetes Neg Hx     Stroke Neg Hx     Thyroid cancer Neg Hx     Breast cancer Neg Hx     Colon cancer Neg Hx     Ovarian cancer Neg Hx            Review of Systems  General: negative for chills, fever or weight loss  Psychological: negative for mood changes or depression  Ophthalmic: negative for blurry vision, photophobia or eye pain  ENT: see HPI  Respiratory: no cough, shortness of breath, or wheezing  Cardiovascular: no chest pain or dyspnea on exertion  Gastrointestinal: no abdominal pain, change in bowel habits, or black/ bloody stools  Musculoskeletal: negative for gait disturbance or muscular weakness  Neurological: no syncope or seizures; no ataxia  Dermatological: negative for pruritis,  rash and jaundice  Hematologic/lymphatic: no easy bruising, no new adenopathy      Physical Exam:    Vitals:    04/12/22 1304   BP: (!) 133/90   Pulse: 89       Constitutional: Well appearing / communicating without difficutly.  NAD.  Eyes: EOM I Bilaterally  Head/Face: Normocephalic.  Negative paranasal sinus pressure/tenderness.  Salivary glands WNL.  House Brackmann I Bilaterally.    Right Ear: Auricle normal appearance. External Auditory Canal within normal limits no lesions or masses,TM retracted with limited mobility.   Left Ear: Auricle normal appearance. External Auditory Canal within normal limits no lesions or masses,TM retracted with limited mobility  Nose: No gross nasal septal deviation. Inferior Turbinates 3+ bilaterally.  Allergic appearing mucosa and turbinates.  No septal perforation. No masses/lesions. External nasal skin appears normal without masses/lesions.  Oral Cavity: Gingiva/lips within normal limits.  Dentition/gingiva healthy appearing. Mucus membranes moist. Floor of mouth soft, no masses palpated. Oral Tongue mobile. Hard Palate appears normal.    Oropharynx: Base of tongue appears normal. No masses/lesions noted. Tonsillar fossa/pharyngeal wall without lesions.  Posterior oropharynx WNL.  Soft palate without masses. Midline uvula.   Neck/Lymphatic: No LAD I-VI bilaterally.  No thyromegaly.  No masses noted on exam.    Mirror laryngoscopy/nasopharyngoscopy: Active gag reflex.  Unable to perform.    Neuro/Psychiatric: AOx3.  Normal mood and affect.   Cardiovascular: Normal carotid pulses bilaterally, no increasing jugular venous distention noted at cervical region bilaterally.    Respiratory: Normal respiratory effort, no stridor, no retractions noted.    Audiogram:  Interpreted by me and reviewed with the patient today.  Hearing within normal limits bilaterally.            Assessment:    ICD-10-CM ICD-9-CM    1. Allergic rhinitis, unspecified seasonality, unspecified trigger  J30.9 477.9 triamcinolone (NASACORT) 55 mcg nasal inhaler   2. Dysfunction of both eustachian tubes  H69.83 381.81      The primary encounter diagnosis was Allergic rhinitis, unspecified seasonality, unspecified trigger. A diagnosis of Dysfunction of both eustachian tubes was also pertinent to this visit.      Plan:  No orders of the defined types were placed in this encounter.        We had a long discussion regarding the anatomy and function of the eustachian tube.  We discussed that the eustachian tube acts as a pump to keep the appropriate amount of pressure behind the ear drum. I discussed auto-insufflation exercises.     I gave the patient a prescription for a nasal steroid spray to be used on a daily basis, and we discussed that it will take 2-3 weeks of daily use to achieve maximal effectiveness.    The patient will try a course of nasal steroid and/or nasal antihistamine for the rhinitis issues.  She was also instructed to take an oral antihistamine OTC.  she will use it as instructed daily.  she was instructed that these medications may take 2-3 weeks of consistent use before they will be at peak efficacy.  The patient will report to me how her symptoms are responding to treatment and we will  make adjustments as needed.      Follow-up in 6 months or sooner if needed.      Estelita Frank MD

## 2022-04-12 NOTE — PATIENT INSTRUCTIONS
The patient was given a prescription for a nasal steroid and/or nasal antihistamine nasal spray and we discussed in detail the proper mechanism of use directing the spray away from the nasal septum.  The patient was also instructed to take a daily oral antihistamine (Claritin, Zyrtec, Allegra, or Xyzal).    In addition, we also discussed that it will take 3-4 weeks of daily use to achieve maximal effectiveness.  The patient will please call in 3-4 weeks with their progress.  If allergy symptoms persist at that time, we could consider additional medications and possibly allergy testing.     We had a long discussion regarding the anatomy and function of the eustachian tube.  We discussed that the eustachian tube acts as a pump to keep the appropriate amount of pressure behind the ear drum.  I gave the patient a prescription for a nasal steroid spray to be used on a daily basis, and we discussed that it will take 2-3 weeks of daily use to achieve maximal effectiveness.  We also discussed otologic valsalva daily for gently depressurizing the middle ear.       How do you use a Nasal Corticosteroid Spray?    Make sure you understand your dosing instructions. Spray only the number of prescribed sprays in each nostril. Read the package instructions before using your spray the first time.    Most corticosteroid sprays suggest the following steps:    Wash your hands well.    Gently blow your nose to clear the passageway.    Shake the container several times.    Tilt your head slightly downward.  Use the opposite hand from the nostril you will be spraying to hold the spray bottle.    Block one nostril with your finger.  Insert the nasal applicator into the other nostril.    Aim the spray toward the outer wall of the nostril.  Inhale slowly through the nose and press the .    Breathe out and repeat to apply the prescribed number of sprays.  Repeat these steps for the other nostril.     Avoid sneezing or blowing  your nose right after spraying.

## 2022-04-12 NOTE — PROGRESS NOTES
Meghna Aragon was seen today in the clinic for an audiologic evaluation.  Her main complaint was difficulty hearing in both ears.    Tympanometry revealed a Type A tympanogram for both ears.  Audiogram results revealed hearing within normal limits bilaterally.  Speech reception thresholds were obtained at 0dB for both ears and speech discrimination scores were 100% for both ears.    Recommendations:  1. Otologic evaluation  2. Follow up hearing test as needed  3. Hearing protection in noise

## 2022-05-13 ENCOUNTER — OFFICE VISIT (OUTPATIENT)
Dept: INTERNAL MEDICINE | Facility: CLINIC | Age: 37
End: 2022-05-13
Attending: INTERNAL MEDICINE
Payer: COMMERCIAL

## 2022-05-13 ENCOUNTER — HOSPITAL ENCOUNTER (OUTPATIENT)
Dept: RADIOLOGY | Facility: OTHER | Age: 37
Discharge: HOME | End: 2022-05-13
Attending: INTERNAL MEDICINE
Payer: COMMERCIAL

## 2022-05-13 VITALS
OXYGEN SATURATION: 100 % | BODY MASS INDEX: 40.42 KG/M2 | HEART RATE: 78 BPM | DIASTOLIC BLOOD PRESSURE: 81 MMHG | HEIGHT: 61 IN | WEIGHT: 214.06 LBS | SYSTOLIC BLOOD PRESSURE: 135 MMHG

## 2022-05-13 DIAGNOSIS — M54.2 NECK PAIN: ICD-10-CM

## 2022-05-13 DIAGNOSIS — S16.1XXA STRAIN OF NECK MUSCLE, INITIAL ENCOUNTER: Primary | ICD-10-CM

## 2022-05-13 PROCEDURE — 99999 PR PBB SHADOW E&M-EST. PATIENT-LVL V: CPT | Mod: PBBFAC,,, | Performed by: INTERNAL MEDICINE

## 2022-05-13 PROCEDURE — 99214 OFFICE O/P EST MOD 30 MIN: CPT | Mod: S$GLB,,, | Performed by: INTERNAL MEDICINE

## 2022-05-13 PROCEDURE — 72050 X-RAY EXAM NECK SPINE 4/5VWS: CPT | Mod: 26,,, | Performed by: RADIOLOGY

## 2022-05-13 PROCEDURE — 72050 X-RAY EXAM NECK SPINE 4/5VWS: CPT | Mod: TC,FY

## 2022-05-13 PROCEDURE — 99214 PR OFFICE/OUTPT VISIT, EST, LEVL IV, 30-39 MIN: ICD-10-PCS | Mod: S$GLB,,, | Performed by: INTERNAL MEDICINE

## 2022-05-13 PROCEDURE — 99999 PR PBB SHADOW E&M-EST. PATIENT-LVL V: ICD-10-PCS | Mod: PBBFAC,,, | Performed by: INTERNAL MEDICINE

## 2022-05-13 PROCEDURE — 72050 XR CERVICAL SPINE COMPLETE 5 VIEW: ICD-10-PCS | Mod: 26,,, | Performed by: RADIOLOGY

## 2022-05-13 RX ORDER — METHOCARBAMOL 750 MG/1
750 TABLET, FILM COATED ORAL EVERY 8 HOURS PRN
Qty: 30 TABLET | Refills: 0 | Status: SHIPPED | OUTPATIENT
Start: 2022-05-13 | End: 2022-05-23

## 2022-05-13 RX ORDER — NAPROXEN 500 MG/1
500 TABLET ORAL 2 TIMES DAILY WITH MEALS
Qty: 30 TABLET | Refills: 0 | Status: SHIPPED | OUTPATIENT
Start: 2022-05-13 | End: 2022-05-28
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
## 2022-05-17 ENCOUNTER — CLINICAL SUPPORT (OUTPATIENT)
Dept: REHABILITATION | Facility: HOSPITAL | Age: 37
End: 2022-05-17
Payer: COMMERCIAL

## 2022-05-17 DIAGNOSIS — S16.1XXA STRAIN OF NECK MUSCLE, INITIAL ENCOUNTER: ICD-10-CM

## 2022-05-17 DIAGNOSIS — R29.898 DECREASED ROM OF NECK: ICD-10-CM

## 2022-05-17 DIAGNOSIS — R53.1 DECREASED STRENGTH: ICD-10-CM

## 2022-05-17 PROCEDURE — 97140 MANUAL THERAPY 1/> REGIONS: CPT | Mod: PN

## 2022-05-17 PROCEDURE — 97161 PT EVAL LOW COMPLEX 20 MIN: CPT | Mod: PN

## 2022-05-17 PROCEDURE — 97110 THERAPEUTIC EXERCISES: CPT | Mod: PN
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
## 2022-05-23 PROBLEM — R53.1 DECREASED STRENGTH: Status: ACTIVE | Noted: 2022-05-23

## 2022-05-25 PROBLEM — R29.898 DECREASED ROM OF NECK: Status: ACTIVE | Noted: 2022-05-25

## 2022-05-31 ENCOUNTER — CLINICAL SUPPORT (OUTPATIENT)
Dept: REHABILITATION | Facility: HOSPITAL | Age: 37
End: 2022-05-31
Payer: COMMERCIAL

## 2022-05-31 DIAGNOSIS — R29.898 DECREASED ROM OF NECK: ICD-10-CM

## 2022-05-31 DIAGNOSIS — R53.1 DECREASED STRENGTH: Primary | ICD-10-CM

## 2022-05-31 PROCEDURE — 97110 THERAPEUTIC EXERCISES: CPT | Mod: PN,CQ

## 2022-05-31 NOTE — PATIENT INSTRUCTIONS
Scapular Retraction (Standing)        With arms at sides, pinch shoulder blades together.  Repeat 10 times per set. Do 1 sets per session. Do 2 sessions per day.     [email or web address]     Copyright © [employer]. All rights reserved.     Snow [place]      Lie on back, arms by your side, palms up.  Swing arms out, allow arms to spread wide, forming a snow [place].    Repeat 10 times per session. Do 2 sessions per day.    Copyright © [employer]. All rights reserved.     trengthening: Resisted External Rotation    Hold Red elastic band in right hand, elbow at side and hand in front of belly button. Rotate forearm out, keeping elbow close to side.  Repeat 10 times each side per set. Do 1 sets per session. Do 2 sessions per day.    Resisted Horizontal Abduction: Bilateral        Lying down, tubing in both hands, arms out in front. Keeping arms straight, pinch shoulder blades together and stretch arms out.  Repeat 10 times per set. Do 1 sets per session. Do 2 sessions per day.     [email or web address].[email or web address]     Copyright © [employer]. All rights reserved.     Strengthening: Resisted Row    Face anchor at waist height, medium to wide stance. Thumbs up, pull arms back, squeezing shoulder blades together. Do not shrug up. Slowly return to start.  Repeat 10 times each side per set. Do 1 sets per session. Do 2 sessions per day.    Supine Hamstring Stretch    Raise your leg with belt around heel of foot. Raise leg until a stretch is felt in the back of the thigh. Keep knee straight. Hold 10 seconds.   Repeat 10 times each side per set. Do 1 sets per session. Do 2 sessions per day.

## 2022-05-31 NOTE — PROGRESS NOTES
"[facility]   Physical Therapy Treatment Note     Name: Meghna Aragon  Clinic Number: [id number]    Therapy Diagnosis:   Encounter Diagnoses   Name Primary?    Decreased strength Yes    Decreased ROM of neck      Physician: Maude Fernandez MD    Visit Date: 5/31/2022    [copy and paste header fPhysician Orders: PT Eval and Treat  Medical Diagnosis from Referral: S16.1XXA (ICD-10-CM) - Strain of neck muscle, initial encounter  Evaluation Date: 5/17/2022  Authorization Period Expiration: 12/31/2022  Plan of Care Expiration: 7/15/22  Visit # / Visits authorized: 2/50  FOTO: 2/5  PTA Visit #: 1/5      Time In: 11:00 AM  Time Out: 11:58 AM  Total Billable Time: 58 minutes (4 TE)     Precautions: Standard, HTN    SUBJECTIVE     Pt reports: having pain and tightness in her neck and upper back.  She was compliant with home exercise program.  Response to previous treatment: no problems.   Functional change: not at this time.     Pain: 5/10  Location: bilateral neck      OBJECTIVE     Objective Measures updated at progress report unless specified.     Treatment     Meghna received the treatments listed below:      therapeutic exercises to develop strength, endurance, ROM, flexibility and posture for 58 minutes including:     Chin tucks: 10x5''  Scapula squeeze: 5"x10  Isometric cervical rotations: 5x5'' holds Bilateral   Cervical rotations: 10x3'' holds Bilateral   Snow angels with towel roll: x10  Chicken wings: 10x  Supine horizontal abduction: x15 red theraband    Theraband rows: 2x10 red theraband  Shoulder external rotation: x10 red theraband Bilateral       Meghna received the following manual therapy techniques: Joint mobilizations, Manual traction, Myofacial release, Manual Lymphatic Drainage, Soft tissue Mobilization and Friction Massage were applied to the: cervical spine for 00xxx minutes, including:  Manual cervical traction, 1 round  Cervical PROM  Cervical sideglides, " grade III-IV to upper and mid cervical spine    Patient Education and Home Exercises     Home Exercises Provided and Patient Education Provided     Education provided:   - keep exercises in a pain free range.  - perform home exercise program at the foot of her bed lying perpendicular to normal.    Written Home Exercises Provided: yes. Exercises were reviewed and Meghna was able to demonstrate them prior to the end of the session.  Meghna demonstrated good  understanding of the education provided. See EMR under Patient Instructions for exercises provided during therapy sessions    ASSESSMENT     Meghna is a 37 y.o. female referred to outpatient Physical Therapy with a medical diagnosis of Strain of neck muscle, initial encounter.  Discussed with patient about using a towel roll under her neck at the end of her work day to allow her neck to relax.  Discussed with patient about posture awareness and importance of home exercise program.     Meghna Is progressing well towards her goals.   Pt prognosis is Excellent.     Pt will continue to benefit from skilled outpatient physical therapy to address the deficits listed in the problem list box on initial evaluation, provide pt/family education and to maximize pt's level of independence in the home and community environment.     Pt's spiritual, cultural and educational needs considered and pt agreeable to plan of care and goals.     Anticipated barriers to physical therapy: none.    GOALS: Short Term Goals: 4 weeks  1. Pt will demo good deep neck flexor strength to improve cervical lordosis and stabilization.  2. Increase cervical ROM to WNL in order to perform ADLs with decreased difficulty.  3. Pt will demo good sitting and standing posture for improved spine health and decreased neck pain.  4. Pt to tolerate HEP to improve ROM and independence with ADL's.     Long Term Goals: 8 weeks  1. Report decreased neck pain </=1/10 to increase tolerance for heavy ADLs and all day  of work.  2. Increase cervical AROM to WFL with min neck pain for increased functional mobility.  3. Increased strength in B shoulders/scapular stabilizers to >/= 4/5 MMT grade to increase tolerance for ADL and work activities.  4. Pt will report at </= 32% impaired on FOTO neck score for neck pain disability to demonstrate decrease in disability and improvement in neck pain.    PLAN     Continue with Plan Of Care and progress toward therapist goals.    Moi Yates, PTA

## 2022-06-03 ENCOUNTER — CLINICAL SUPPORT (OUTPATIENT)
Dept: REHABILITATION | Facility: HOSPITAL | Age: 37
End: 2022-06-03
Payer: COMMERCIAL

## 2022-06-03 DIAGNOSIS — R53.1 DECREASED STRENGTH: Primary | ICD-10-CM

## 2022-06-03 DIAGNOSIS — R29.898 DECREASED ROM OF NECK: ICD-10-CM

## 2022-06-03 PROCEDURE — 97140 MANUAL THERAPY 1/> REGIONS: CPT | Mod: PN | Performed by: PHYSICAL THERAPIST

## 2022-06-03 PROCEDURE — 97110 THERAPEUTIC EXERCISES: CPT | Mod: PN | Performed by: PHYSICAL THERAPIST

## 2022-06-03 NOTE — PROGRESS NOTES
"[facility]   Physical Therapy Treatment Note     Name: Meghna Aragon  Clinic Number: [id number]    Therapy Diagnosis:   Encounter Diagnoses   Name Primary?    Decreased strength Yes    Decreased ROM of neck      Physician: Maude Fernandez MD    Visit Date: 6/3/2022    [copy and paste header fPhysician Orders: PT Eval and Treat  Medical Diagnosis from Referral: S16.1XXA (ICD-10-CM) - Strain of neck muscle, initial encounter  Evaluation Date: 5/17/2022  Authorization Period Expiration: 12/31/2022  Plan of Care Expiration: 7/15/22  Visit # / Visits authorized: 3/50  FOTO: 3/5  PTA Visit #: 0/5      Time In: 1:30 PM  Time Out: 2:26 PM  Total Billable Time: 56 minutes (3 TE, 1 MT)     Precautions: Standard, HTN    SUBJECTIVE     Pt reports: she didn't have a bad day at work yesterday (symptoms typically worst at night after work) and feels good today (did not work). "I think it's working"    She was compliant with home exercise program.  Response to previous treatment: no problems.   Functional change: not at this time.     Pain: 4/10  Location: bilateral neck      OBJECTIVE     Objective Measures updated at progress report unless specified.     Treatment     Meghna received the treatments listed below:      therapeutic exercises to develop strength, endurance, ROM, flexibility and posture for 46 minutes including:     Chin tucks: 10x5''  Scapular squeeze: 5"x10  Seated Isometric cervical rotations: 10x5'' holds Bilateral   Seated Cervical rotations: 10x3'' holds Bilateral   Snow angels with towel roll: 2x10  Chicken wings: 10x - not today  Supine horizontal abduction: 2x10 red theraband    Prone scapular retraction: 15x5" holds  Prone scapular retraction with extension: 3x20"  Prone scapular retraction with T: 3x10"    Theraband rows: 2x10 green theraband  Shoulder external rotation: x10 red theraband Bilateral       Meghna received the following manual therapy techniques: Joint " mobilizations, Manual traction, Myofacial release, Manual Lymphatic Drainage, Soft tissue Mobilization and Friction Massage were applied to the: cervical spine for 10 minutes, including:    Soft tissue mobilization with hands and cups to right upper trap and down medial scapular border to improve soft tissue mobility, pain, posture and blood flow    Not today:  Manual cervical traction, 1 round  Cervical PROM  Cervical sideglides, grade III-IV to upper and mid cervical spine    Patient Education and Home Exercises     Home Exercises Provided and Patient Education Provided     Education provided:   - keep exercises in a pain free range.  - perform home exercise program at the foot of her bed lying perpendicular to normal.    Written Home Exercises Provided: yes. Exercises were reviewed and Meghna was able to demonstrate them prior to the end of the session.  Meghna demonstrated good  understanding of the education provided. See EMR under Patient Instructions for exercises provided during therapy sessions    ASSESSMENT     Meghna is a 37 y.o. female referred to outpatient Physical Therapy with a medical diagnosis of Strain of neck muscle, initial encounter.  Right upper trap discomfort significantly improved after manuals today. No pain reported with cervical motions after manuals as well today. Symptoms present as whiplash/myofascial restriction.    Meghna Is progressing well towards her goals.   Pt prognosis is Excellent.     Pt will continue to benefit from skilled outpatient physical therapy to address the deficits listed in the problem list box on initial evaluation, provide pt/family education and to maximize pt's level of independence in the home and community environment.     Pt's spiritual, cultural and educational needs considered and pt agreeable to plan of care and goals.     Anticipated barriers to physical therapy: none.    GOALS: Short Term Goals: 4 weeks  1. Pt will demo good deep neck flexor strength  to improve cervical lordosis and stabilization. PROGRESSING; NOT MET  2. Increase cervical ROM to WNL in order to perform ADLs with decreased difficulty. PROGRESSING; NOT MET  3. Pt will demo good sitting and standing posture for improved spine health and decreased neck pain. PROGRESSING; NOT MET  4. Pt to tolerate HEP to improve ROM and independence with ADL's. MET 6/3/22     Long Term Goals: 8 weeks  1. Report decreased neck pain </=1/10 to increase tolerance for heavy ADLs and all day of work. PROGRESSING; NOT MET  2. Increase cervical AROM to WFL with min neck pain for increased functional mobility. PROGRESSING; NOT MET  3. Increased strength in B shoulders/scapular stabilizers to >/= 4/5 MMT grade to increase tolerance for ADL and work activities. PROGRESSING; NOT MET  4. Pt will report at </= 32% impaired on FOTO neck score for neck pain disability to demonstrate decrease in disability and improvement in neck pain. PROGRESSING; NOT MET    PLAN     Continue with Plan Of Care and progress toward therapist goals.    Lobito Solorzano, PT

## 2022-06-07 ENCOUNTER — OFFICE VISIT (OUTPATIENT)
Dept: OBSTETRICS AND GYNECOLOGY | Facility: CLINIC | Age: 37
End: 2022-06-07
Payer: COMMERCIAL

## 2022-06-07 ENCOUNTER — CLINICAL SUPPORT (OUTPATIENT)
Dept: REHABILITATION | Facility: HOSPITAL | Age: 37
End: 2022-06-07
Payer: COMMERCIAL

## 2022-06-07 VITALS
WEIGHT: 211 LBS | BODY MASS INDEX: 39.84 KG/M2 | SYSTOLIC BLOOD PRESSURE: 128 MMHG | HEIGHT: 61 IN | DIASTOLIC BLOOD PRESSURE: 82 MMHG

## 2022-06-07 DIAGNOSIS — Z12.4 ENCOUNTER FOR PAPANICOLAOU SMEAR FOR CERVICAL CANCER SCREENING: ICD-10-CM

## 2022-06-07 DIAGNOSIS — Z11.51 SCREENING FOR HPV (HUMAN PAPILLOMAVIRUS): ICD-10-CM

## 2022-06-07 DIAGNOSIS — R29.898 DECREASED ROM OF NECK: ICD-10-CM

## 2022-06-07 DIAGNOSIS — Z01.419 WOMEN'S ANNUAL ROUTINE GYNECOLOGICAL EXAMINATION: Primary | ICD-10-CM

## 2022-06-07 DIAGNOSIS — R53.1 DECREASED STRENGTH: Primary | ICD-10-CM

## 2022-06-07 PROCEDURE — 99395 PR PREVENTIVE VISIT,EST,18-39: ICD-10-PCS | Mod: S$GLB,,, | Performed by: NURSE PRACTITIONER

## 2022-06-07 PROCEDURE — 99999 PR PBB SHADOW E&M-EST. PATIENT-LVL III: ICD-10-PCS | Mod: PBBFAC,,, | Performed by: NURSE PRACTITIONER

## 2022-06-07 PROCEDURE — 97140 MANUAL THERAPY 1/> REGIONS: CPT | Mod: PN,CQ

## 2022-06-07 PROCEDURE — 88175 CYTOPATH C/V AUTO FLUID REDO: CPT | Performed by: NURSE PRACTITIONER

## 2022-06-07 PROCEDURE — 97110 THERAPEUTIC EXERCISES: CPT | Mod: PN,CQ

## 2022-06-07 PROCEDURE — 99395 PREV VISIT EST AGE 18-39: CPT | Mod: S$GLB,,, | Performed by: NURSE PRACTITIONER

## 2022-06-07 PROCEDURE — 87624 HPV HI-RISK TYP POOLED RSLT: CPT | Performed by: NURSE PRACTITIONER

## 2022-06-07 PROCEDURE — 99999 PR PBB SHADOW E&M-EST. PATIENT-LVL III: CPT | Mod: PBBFAC,,, | Performed by: NURSE PRACTITIONER

## 2022-06-07 NOTE — PROGRESS NOTES
CC: Annual  HPI: Pt is a 37 y.o.  female who presents for routine annual exam. Her kids are 2 and 5 - older child is in speech therapy. She is not using any contraception. Pt with h/o hyperprolactinemia.  Reports cycles are regular on cabergoline.  She does not want STD screening.  She has a left dermoid cyst- no issues with this at present.  The patient participates in regular exercise: no.  The patient does not smoke.  The patient wears seatbelts.   Pt denies any domestic violence.     FH:  Breast cancer: none  Colon cancer: none  Ovarian cancer: none  Endometrial cancer: none    ROS:  GENERAL: Feeling well overall. Denies fever or chills.   SKIN: Denies rash or lesions.   HEAD: Denies head injury or headache.   NODES: Denies enlarged lymph nodes.   CHEST: Denies chest pain or shortness of breath.   CARDIOVASCULAR: Denies palpitations or left sided chest pain.   ABDOMEN: No abdominal pain, constipation, diarrhea, nausea, vomiting or rectal bleeding.   URINARY: No dysuria, hematuria, or burning on urination.  REPRODUCTIVE: See HPI.   BREASTS: Denies pain, lumps, or nipple discharge.   HEMATOLOGIC: No easy bruisability or excessive bleeding.   MUSCULOSKELETAL: Denies joint pain or swelling.   NEUROLOGIC: Denies syncope or weakness.   PSYCHIATRIC: Denies depression, anxiety or mood swings.    PE:   APPEARANCE: Well nourished, well developed, Black or  female in no acute distress.  NODES: no cervical, supraclavicular, or inguinal lymphadenopathy  BREASTS: Symmetrical, no skin changes or visible lesions. No palpable masses, nipple discharge or adenopathy bilaterally.  ABDOMEN: Soft. No tenderness or masses. No distention. No hernias palpated. No CVA tenderness.  VULVA: No lesions. Normal external female genitalia.  URETHRAL MEATUS: Normal size and location, no lesions, no prolapse.  URETHRA: No masses, tenderness, or prolapse.  VAGINA: Moist. No lesions or lacerations noted. No abnormal discharge  present. No odor present.   CERVIX: No lesions or discharge. No cervical motion tenderness.   UTERUS: Normal size, regular shape, mobile, non-tender.  ADNEXA: No tenderness. No fullness or masses palpated in the adnexal regions.   ANUS PERINEUM: Normal.      Diagnosis:  1. Women's annual routine gynecological examination    2. Encounter for Papanicolaou smear for cervical cancer screening    3. Screening for HPV (human papillomavirus)        Plan:   Pap/ HPV      Orders Placed This Encounter    HPV High Risk Genotypes, PCR    Liquid-Based Pap Smear, Screening       Patient was counseled today on the new ACS guidelines for cervical cytology screening as well as the current recommendations for breast cancer screening. She was counseled to follow up with her PCP for other routine health maintenance. Counseling session lasted approximately 10 minutes, and all her questions were answered.    Follow-up with me in 1 year for routine exam    Kim Gonzalez, MAIRAC

## 2022-06-07 NOTE — PROGRESS NOTES
"[facility]   Physical Therapy Treatment Note     Name: Meghna Aragon  Clinic Number: [id number]    Therapy Diagnosis:   Encounter Diagnoses   Name Primary?    Decreased strength Yes    Decreased ROM of neck      Physician: Maude Fernandez MD    Visit Date: 6/7/2022    [copy and paste header fPhysician Orders: PT Eval and Treat  Medical Diagnosis from Referral: S16.1XXA (ICD-10-CM) - Strain of neck muscle, initial encounter  Evaluation Date: 5/17/2022  Authorization Period Expiration: 12/31/2022  Plan of Care Expiration: 7/15/22  Visit # / Visits authorized: 4/50  FOTO: 4/5  PTA Visit #: 1/5     Time In: 2:00 PM  Time Out: 2:40 PM  Total Billable Time: 40 minutes (2 TE, 1 MT)     Precautions: Standard, HTN    SUBJECTIVE     Pt reports: having more pain along the right side of her neck and upper back.  Patient reports she felt good after the cupping last visit but still has some discoloration present.    She was compliant with home exercise program.  Response to previous treatment: no problems.   Functional change: not at this time.     Pain: 4-5/10  Location: bilateral neck      OBJECTIVE     Objective Measures updated at progress report unless specified.     Treatment     Meghna received the treatments listed below:      therapeutic exercises to develop strength, endurance, ROM, flexibility and posture for 35 minutes including:     Chin tucks: 10x5''  Scapular squeeze: 5"x15  Seated Isometric cervical rotations: 10x5'' holds Bilateral   Seated Cervical rotations: 10x3'' holds Bilateral   Snow angels with towel roll: 2x10  Butterflies with towel roll: x10  Chicken wings: 10x - not today  Supine horizontal abduction: 2x10 red theraband    Prone scapular retraction: 15x5" holds  Prone scapular retraction with extension: 3x20"  Prone scapular retraction with T: 3x10"    Theraband rows: 3x10 green theraband  Shoulder external rotation: 2x10 red theraband Bilateral       Meghna " received the following manual therapy techniques: Joint mobilizations, Manual traction, Myofacial release, Manual Lymphatic Drainage, Soft tissue Mobilization and Friction Massage were applied to the: cervical spine for 5 minutes, including:    Manual cervical traction, 1 round  Cervical PROM    Not today:  Soft tissue mobilization with hands and cups to right upper trap and down medial scapular border to improve soft tissue mobility, pain, posture and blood flow  Cervical sideglides, grade III-IV to upper and mid cervical spine    Patient Education and Home Exercises     Home Exercises Provided and Patient Education Provided     Education provided:   - keep exercises in a pain free range.  - perform home exercise program at the foot of her bed lying perpendicular to normal.    Written Home Exercises Provided: yes. Exercises were reviewed and Meghna was able to demonstrate them prior to the end of the session.  Meghna demonstrated good  understanding of the education provided. See EMR under Patient Instructions for exercises provided during therapy sessions    ASSESSMENT     Meghna is a 37 y.o. female referred to outpatient Physical Therapy with a medical diagnosis of Strain of neck muscle, initial encounter.  Patient responded well to cupping from previous visit but due to reports of discoloration, no cupping performed today.  Patient had no difficulty with new exercise added along with today's progressions, noted in bold.    Meghna Is progressing well towards her goals.   Pt prognosis is Excellent.     Pt will continue to benefit from skilled outpatient physical therapy to address the deficits listed in the problem list box on initial evaluation, provide pt/family education and to maximize pt's level of independence in the home and community environment.     Pt's spiritual, cultural and educational needs considered and pt agreeable to plan of care and goals.     Anticipated barriers to physical therapy:  none.    GOALS: Short Term Goals: 4 weeks  1. Pt will demo good deep neck flexor strength to improve cervical lordosis and stabilization. PROGRESSING; NOT MET  2. Increase cervical ROM to WNL in order to perform ADLs with decreased difficulty. PROGRESSING; NOT MET  3. Pt will demo good sitting and standing posture for improved spine health and decreased neck pain. PROGRESSING; NOT MET  4. Pt to tolerate HEP to improve ROM and independence with ADL's. MET 6/3/22     Long Term Goals: 8 weeks  1. Report decreased neck pain </=1/10 to increase tolerance for heavy ADLs and all day of work. PROGRESSING; NOT MET  2. Increase cervical AROM to WFL with min neck pain for increased functional mobility. PROGRESSING; NOT MET  3. Increased strength in B shoulders/scapular stabilizers to >/= 4/5 MMT grade to increase tolerance for ADL and work activities. PROGRESSING; NOT MET  4. Pt will report at </= 32% impaired on FOTO neck score for neck pain disability to demonstrate decrease in disability and improvement in neck pain. PROGRESSING; NOT MET    PLAN     Continue with Plan Of Care and progress toward therapist goals.    Moi Yates, PTA

## 2022-06-13 ENCOUNTER — CLINICAL SUPPORT (OUTPATIENT)
Dept: REHABILITATION | Facility: HOSPITAL | Age: 37
End: 2022-06-13
Payer: COMMERCIAL

## 2022-06-13 DIAGNOSIS — R29.898 DECREASED ROM OF NECK: ICD-10-CM

## 2022-06-13 DIAGNOSIS — R53.1 DECREASED STRENGTH: Primary | ICD-10-CM

## 2022-06-13 LAB
FINAL PATHOLOGIC DIAGNOSIS: NORMAL
HPV HR 12 DNA SPEC QL NAA+PROBE: NEGATIVE
HPV16 AG SPEC QL: NEGATIVE
HPV18 DNA SPEC QL NAA+PROBE: NEGATIVE
Lab: NORMAL

## 2022-06-13 PROCEDURE — 97140 MANUAL THERAPY 1/> REGIONS: CPT | Mod: PN | Performed by: PHYSICAL THERAPIST

## 2022-06-13 PROCEDURE — 97110 THERAPEUTIC EXERCISES: CPT | Mod: PN | Performed by: PHYSICAL THERAPIST

## 2022-06-13 NOTE — PROGRESS NOTES
"[facility]   Physical Therapy Treatment Note     Name: Meghna Aragon  Clinic Number: [id number]    Therapy Diagnosis:   Encounter Diagnoses   Name Primary?    Decreased strength Yes    Decreased ROM of neck      Physician: Maude Fernandez MD    Visit Date: 6/13/2022    [copy and paste header fPhysician Orders: PT Eval and Treat  Medical Diagnosis from Referral: S16.1XXA (ICD-10-CM) - Strain of neck muscle, initial encounter  Evaluation Date: 5/17/2022  Authorization Period Expiration: 12/31/2022  Plan of Care Expiration: 7/15/22  Visit # / Visits authorized: 5/50  FOTO: 5/5 Performed on 6/13/22  PTA Visit #: 0/5     Time In: 9:15 PM  Time Out: 10:12 PM  Total Billable Time: 55 minutes (3 TE, 1 MT)     Precautions: Standard, HTN    SUBJECTIVE     Pt reports: she slept on her right side last night and her anterior shoulder is bothering her. Her "normal pain" is like a 4/10. She did get the new pillow recommended last visit and is eager to try.    She was compliant with home exercise program.  Response to previous treatment: no problems.   Functional change: not at this time.     Pain: 4/10  Location: bilateral neck      OBJECTIVE     6/13/22:    () = new measurements    AROM: CERVICAL   Flexion 34 (55)   Extension 42 (48)   Right side bending 38 (42)   Left side bending 35 (44)   Right rotation 28 (75)   Left rotation 25 (70)          Treatment     Meghna received the treatments listed below:      therapeutic exercises to develop strength, endurance, ROM, flexibility and posture for 47 minutes including:     Chin tucks: 10x5''  Seated Isometric cervical rotations: 10x5'' holds Bilateral   Seated Cervical rotations: 10x3'' holds Bilateral   Supine horizontal abduction: 2x10 red theraband  deep neck flexor nod: 15x5" holds  deep neck flexor nod with lift off: 5x10" holds  Open books: 10x5" holds each    Prone scapular retraction: 15x5" holds  Prone scapular retraction with " "extension: 3x25"  Prone scapular retraction with T: 3x15"  Prone on elbow chin tuck: 15x5"   Quadruped thread the needed: 2x5 each (more difficult with right)    Theraband rows: 3x10 green theraband  Shoulder external rotation: 2x10 red theraband Bilateral      Not today:  Snow angels with towel roll: 2x10  Butterflies with towel roll: x10  Chicken wings: 10x - not today     Meghna received the following manual therapy techniques: Joint mobilizations, Manual traction, Myofacial release, Manual Lymphatic Drainage, Soft tissue Mobilization and Friction Massage were applied to the: cervical spine for 8 minutes, including:    Seated Grade V mid thoracic mobs  Prone Grade II-III mid thoracic and right costovertebral/P->A mobs     Not today:  Manual cervical traction, 1 round  Cervical PROM  Soft tissue mobilization with hands and cups to right upper trap and down medial scapular border to improve soft tissue mobility, pain, posture and blood flow  Cervical sideglides, grade III-IV to upper and mid cervical spine    Patient Education and Home Exercises     Home Exercises Provided and Patient Education Provided     Education provided:   - keep exercises in a pain free range.  - perform home exercise program at the foot of her bed lying perpendicular to normal.    Written Home Exercises Provided: yes. Exercises were reviewed and Meghna was able to demonstrate them prior to the end of the session.  Meghna demonstrated good  understanding of the education provided. See EMR under Patient Instructions for exercises provided during therapy sessions    ASSESSMENT     Meghna is a 37 y.o. female referred to outpatient Physical Therapy with a medical diagnosis of Strain of neck muscle, initial encounter.  Patient presents with mid thoracic kyphosis and increased angle at CT junction. Most of her pain is currently right sided mid thoracic and posterior rib location. Decreased right upper trap tone today. Cervical AROM now WNL and " pain free.    Meghna Is progressing well towards her goals.   Pt prognosis is Excellent.     Pt will continue to benefit from skilled outpatient physical therapy to address the deficits listed in the problem list box on initial evaluation, provide pt/family education and to maximize pt's level of independence in the home and community environment.     Pt's spiritual, cultural and educational needs considered and pt agreeable to plan of care and goals.     Anticipated barriers to physical therapy: none.    GOALS: Short Term Goals: 4 weeks  1. Pt will demo good deep neck flexor strength to improve cervical lordosis and stabilization. PROGRESSING; NOT MET  2. Increase cervical ROM to WNL in order to perform ADLs with decreased difficulty. MET 6/13/22  3. Pt will demo good sitting and standing posture for improved spine health and decreased neck pain. PROGRESSING; NOT MET  4. Pt to tolerate HEP to improve ROM and independence with ADL's. MET 6/3/22     Long Term Goals: 8 weeks  1. Report decreased neck pain </=1/10 to increase tolerance for heavy ADLs and all day of work. PROGRESSING; NOT MET  2. Increase cervical AROM to WFL with min neck pain for increased functional mobility. MET 6/13/22  3. Increased strength in B shoulders/scapular stabilizers to >/= 4/5 MMT grade to increase tolerance for ADL and work activities. PROGRESSING; NOT MET  4. Pt will report at </= 32% impaired on FOTO neck score for neck pain disability to demonstrate decrease in disability and improvement in neck pain. PROGRESSING; NOT MET    PLAN     Continue with Plan Of Care and progress toward therapist goals.    Lobito Solorzano, PT

## 2022-06-14 ENCOUNTER — CLINICAL SUPPORT (OUTPATIENT)
Dept: REHABILITATION | Facility: HOSPITAL | Age: 37
End: 2022-06-14
Payer: COMMERCIAL

## 2022-06-14 DIAGNOSIS — R53.1 DECREASED STRENGTH: Primary | ICD-10-CM

## 2022-06-14 DIAGNOSIS — R29.898 DECREASED ROM OF NECK: ICD-10-CM

## 2022-06-14 PROCEDURE — 97110 THERAPEUTIC EXERCISES: CPT | Mod: PN,CQ

## 2022-06-14 PROCEDURE — 97140 MANUAL THERAPY 1/> REGIONS: CPT | Mod: PN,CQ

## 2022-06-14 NOTE — PROGRESS NOTES
"[facility]   Physical Therapy Treatment Note     Name: Meghna Aragon  Clinic Number: [id number]    Therapy Diagnosis:   Encounter Diagnoses   Name Primary?    Decreased strength Yes    Decreased ROM of neck      Physician: Maude Fernandez MD    Visit Date: 6/14/2022    [copy and paste header fPhysician Orders: PT Eval and Treat  Medical Diagnosis from Referral: S16.1XXA (ICD-10-CM) - Strain of neck muscle, initial encounter  Evaluation Date: 5/17/2022  Authorization Period Expiration: 12/31/2022  Plan of Care Expiration: 7/15/22  Visit # / Visits authorized: 6/50  FOTO: 1/5 Performed on 6/13/22  PTA Visit #: 1/5     Time In: 4:00 PM  Time Out: 4:55 PM  Total Billable Time: 55 minutes (3 TE, 1 MT)     Precautions: Standard, HTN    SUBJECTIVE     Pt reports: she was hurting a little after the new exercises yesterday but by the time she left she felt better.  Patient reports her pain a little better overall today.    She was compliant with home exercise program.  Response to previous treatment: no problems.   Functional change: not at this time.     Pain: 3/10  Location: bilateral neck      OBJECTIVE     6/13/22:    () = new measurements    AROM: CERVICAL   Flexion 34 (55)   Extension 42 (48)   Right side bending 38 (42)   Left side bending 35 (44)   Right rotation 28 (75)   Left rotation 25 (70)        Treatment     Meghna received the treatments listed below:      therapeutic exercises to develop strength, endurance, ROM, flexibility and posture for 45 minutes including:     Chin tucks: 10x5''  Seated Isometric cervical rotations: 10x5'' holds Bilateral   Seated Cervical rotations: 10x3'' holds Bilateral   Supine horizontal abduction: 2x10 red theraband  Deep neck flexor nod: 15x5" holds  Deep neck flexor nod with lift off: 5x10" holds  Open books: 10x5" holds each    Prone scapular retraction: 15x5" holds  Prone scapular retraction with extension: 3x25"  Prone scapular " "retraction with T: 3x15"  Prone on elbow chin tuck: 15x5"   Quadruped thread the needed: 2x7 each (more difficult with right)    Theraband rows: 3x10 blue theraband  Shoulder external rotation: 2x10 red theraband Bilateral      Not today:  Snow angels with towel roll: 2x10  Butterflies with towel roll: x10  Chicken wings: 10x - not today     Meghna received the following manual therapy techniques: Joint mobilizations, Manual traction, Myofacial release, Manual Lymphatic Drainage, Soft tissue Mobilization and Friction Massage were applied to the: cervical spine for 10 minutes, including:    Manual cervical traction, 1 round  Cervical PROM    Not today:  Seated Grade V mid thoracic mobs  Prone Grade II-III mid thoracic and right costovertebral/P->A mobs   Soft tissue mobilization with hands and cups to right upper trap and down medial scapular border to improve soft tissue mobility, pain, posture and blood flow  Cervical sideglides, grade III-IV to upper and mid cervical spine    Patient Education and Home Exercises     Home Exercises Provided and Patient Education Provided     Education provided:   - keep exercises in a pain free range.  - perform home exercise program at the foot of her bed lying perpendicular to normal.    Written Home Exercises Provided: yes. Exercises were reviewed and Meghna was able to demonstrate them prior to the end of the session.  Meghna demonstrated good  understanding of the education provided. See EMR under Patient Instructions for exercises provided during therapy sessions    ASSESSMENT     Meghna is a 37 y.o. female referred to outpatient Physical Therapy with a medical diagnosis of Strain of neck muscle, initial encounter.  Patient presents with decreased complaint of pain today.  Patient had no difficulty with today's progressions, noted in bold.    Meghna Is progressing well towards her goals.   Pt prognosis is Excellent.     Pt will continue to benefit from skilled outpatient " physical therapy to address the deficits listed in the problem list box on initial evaluation, provide pt/family education and to maximize pt's level of independence in the home and community environment.     Pt's spiritual, cultural and educational needs considered and pt agreeable to plan of care and goals.     Anticipated barriers to physical therapy: none.    GOALS:   Short Term Goals: 4 weeks  1. Pt will demo good deep neck flexor strength to improve cervical lordosis and stabilization. PROGRESSING; NOT MET  2. Increase cervical ROM to WNL in order to perform ADLs with decreased difficulty. MET 6/13/22  3. Pt will demo good sitting and standing posture for improved spine health and decreased neck pain. PROGRESSING; NOT MET  4. Pt to tolerate HEP to improve ROM and independence with ADL's. MET 6/3/22     Long Term Goals: 8 weeks  1. Report decreased neck pain </=1/10 to increase tolerance for heavy ADLs and all day of work. PROGRESSING; NOT MET  2. Increase cervical AROM to WFL with min neck pain for increased functional mobility. MET 6/13/22  3. Increased strength in B shoulders/scapular stabilizers to >/= 4/5 MMT grade to increase tolerance for ADL and work activities. PROGRESSING; NOT MET  4. Pt will report at </= 32% impaired on FOTO neck score for neck pain disability to demonstrate decrease in disability and improvement in neck pain. PROGRESSING; NOT MET    PLAN     Continue with Plan Of Care and progress toward therapist goals.    Moi Yates, PTA

## 2022-06-21 ENCOUNTER — CLINICAL SUPPORT (OUTPATIENT)
Dept: REHABILITATION | Facility: HOSPITAL | Age: 37
End: 2022-06-21
Payer: COMMERCIAL

## 2022-06-21 DIAGNOSIS — R53.1 DECREASED STRENGTH: Primary | ICD-10-CM

## 2022-06-21 DIAGNOSIS — R29.898 DECREASED ROM OF NECK: ICD-10-CM

## 2022-06-21 PROCEDURE — 97140 MANUAL THERAPY 1/> REGIONS: CPT | Mod: PN,CQ

## 2022-06-21 PROCEDURE — 97110 THERAPEUTIC EXERCISES: CPT | Mod: PN,CQ

## 2022-06-21 NOTE — PROGRESS NOTES
"[facility]   Physical Therapy Treatment Note     Name: Meghna Aragon  Clinic Number: [id number]    Therapy Diagnosis:   Encounter Diagnoses   Name Primary?    Decreased strength Yes    Decreased ROM of neck      Physician: Maude Fernandez MD    Visit Date: 6/21/2022    [copy and paste header fPhysician Orders: PT Eval and Treat  Medical Diagnosis from Referral: S16.1XXA (ICD-10-CM) - Strain of neck muscle, initial encounter  Evaluation Date: 5/17/2022  Authorization Period Expiration: 12/31/2022  Plan of Care Expiration: 7/15/22  Visit # / Visits authorized: 7/50  FOTO: 2/5 Performed on 6/13/22  PTA Visit #: 2/5     Time In: 2:00 PM  Time Out: 2:58 PM  Total Billable Time: 58 minutes (3 TE, 1 MT)     Precautions: Standard, HTN    SUBJECTIVE     Pt reports: "my pain is a little better and that's after I worked yesterday."    She was compliant with home exercise program.  Response to previous treatment: no problems.   Functional change: not at this time.     Pain: 3/10  Location: bilateral neck      OBJECTIVE     6/13/22:    () = new measurements    AROM: CERVICAL   Flexion 34 (55)   Extension 42 (48)   Right side bending 38 (42)   Left side bending 35 (44)   Right rotation 28 (75)   Left rotation 25 (70)        Treatment     Meghna received the treatments listed below:      therapeutic exercises to develop strength, endurance, ROM, flexibility and posture for 48 minutes including:     Chin tucks: 10x5''  Seated Isometric cervical rotations: 10x5'' holds Bilateral   Seated Cervical rotations: 10x3'' holds Bilateral   Supine horizontal abduction: 2x10 red theraband  Deep neck flexor nod: 15x5" holds  Deep neck flexor nod with lift off: 5x10" holds  Open books: 10x5" holds each    Prone scapular retraction: 15x5" holds  Prone scapular retraction with extension: 3x25"  Prone scapular retraction with T: 3x15"  Prone on elbow chin tuck: 15x5"   Quadruped thread the needed: 2x7 each " (more difficult with right)    Theraband rows: 3x10 blue theraband  Shoulder external rotation: 2x10 red theraband Bilateral      Not today:  Snow angels with towel roll: 2x10  Butterflies with towel roll: x10  Chicken wings: 10x - not today     Meghna received the following manual therapy techniques: Joint mobilizations, Manual traction, Myofacial release, Manual Lymphatic Drainage, Soft tissue Mobilization and Friction Massage were applied to the: cervical spine for 10 minutes, including:    Manual cervical traction, 1 round  Cervical PROM    Not today:  Seated Grade V mid thoracic mobs  Prone Grade II-III mid thoracic and right costovertebral/P->A mobs   Soft tissue mobilization with hands and cups to right upper trap and down medial scapular border to improve soft tissue mobility, pain, posture and blood flow  Cervical sideglides, grade III-IV to upper and mid cervical spine    Patient Education and Home Exercises     Home Exercises Provided and Patient Education Provided     Education provided:   - keep exercises in a pain free range.  - perform home exercise program at the foot of her bed lying perpendicular to normal.    Written Home Exercises Provided: yes. Exercises were reviewed and Meghna was able to demonstrate them prior to the end of the session.  Meghna demonstrated good  understanding of the education provided. See EMR under Patient Instructions for exercises provided during therapy sessions    ASSESSMENT     Meghna is a 37 y.o. female referred to outpatient Physical Therapy with a medical diagnosis of Strain of neck muscle, initial encounter.  Patient presents with decreased complaint of pain today.  Patient requires minimal cueing for proper execution of exercises.  Patient requires no rest breaks between exercises.    Meghna Is progressing well towards her goals.   Pt prognosis is Excellent.     Pt will continue to benefit from skilled outpatient physical therapy to address the deficits listed in  the problem list box on initial evaluation, provide pt/family education and to maximize pt's level of independence in the home and community environment.     Pt's spiritual, cultural and educational needs considered and pt agreeable to plan of care and goals.     Anticipated barriers to physical therapy: none.    GOALS:   Short Term Goals: 4 weeks  1. Pt will demo good deep neck flexor strength to improve cervical lordosis and stabilization. PROGRESSING; NOT MET  2. Increase cervical ROM to WNL in order to perform ADLs with decreased difficulty. MET 6/13/22  3. Pt will demo good sitting and standing posture for improved spine health and decreased neck pain. PROGRESSING; NOT MET  4. Pt to tolerate HEP to improve ROM and independence with ADL's. MET 6/3/22     Long Term Goals: 8 weeks  1. Report decreased neck pain </=1/10 to increase tolerance for heavy ADLs and all day of work. PROGRESSING; NOT MET  2. Increase cervical AROM to WFL with min neck pain for increased functional mobility. MET 6/13/22  3. Increased strength in B shoulders/scapular stabilizers to >/= 4/5 MMT grade to increase tolerance for ADL and work activities. PROGRESSING; NOT MET  4. Pt will report at </= 32% impaired on FOTO neck score for neck pain disability to demonstrate decrease in disability and improvement in neck pain. PROGRESSING; NOT MET    PLAN     Continue with Plan Of Care and progress toward therapist goals.    Moi Yates, PTA

## 2022-06-21 NOTE — PROGRESS NOTES
See ultrasound report for details of ultrasound evaluation and recommendations.      I have personally seen and examined the patient. I have collaborated with and supervised the

## 2022-06-24 ENCOUNTER — CLINICAL SUPPORT (OUTPATIENT)
Dept: REHABILITATION | Facility: HOSPITAL | Age: 37
End: 2022-06-24
Payer: COMMERCIAL

## 2022-06-24 DIAGNOSIS — R29.898 DECREASED ROM OF NECK: ICD-10-CM

## 2022-06-24 DIAGNOSIS — R53.1 DECREASED STRENGTH: Primary | ICD-10-CM

## 2022-06-24 PROCEDURE — 97140 MANUAL THERAPY 1/> REGIONS: CPT | Mod: PN,CQ

## 2022-06-24 PROCEDURE — 97110 THERAPEUTIC EXERCISES: CPT | Mod: PN,CQ

## 2022-06-24 NOTE — PROGRESS NOTES
"[facility]   Physical Therapy Treatment Note     Name: Meghna Aragon  Clinic Number: [id number]    Therapy Diagnosis:   Encounter Diagnoses   Name Primary?    Decreased strength Yes    Decreased ROM of neck      Physician: Maude Fernandez MD    Visit Date: 6/24/2022    [copy and paste header fPhysician Orders: PT Eval and Treat  Medical Diagnosis from Referral: S16.1XXA (ICD-10-CM) - Strain of neck muscle, initial encounter  Evaluation Date: 5/17/2022  Authorization Period Expiration: 12/31/2022  Plan of Care Expiration: 7/15/22  Visit # / Visits authorized: 8/50  FOTO: 3/5 Performed on 6/13/22  PTA Visit #: 3/5     Time In: 10:00 AM  Time Out: 11:00 AM  Total Billable Time: 60 minutes (3 TE, 1 MT)     Precautions: Standard, HTN    SUBJECTIVE     Pt reports: she had to work yesterday and her pain was about 8/10 but is feeling better today, 3/10.    She was compliant with home exercise program.  Response to previous treatment: no problems.   Functional change: not at this time.     Pain: 3/10  Location: bilateral neck      OBJECTIVE     6/13/22:    () = new measurements    AROM: CERVICAL   Flexion 34 (55)   Extension 42 (48)   Right side bending 38 (42)   Left side bending 35 (44)   Right rotation 28 (75)   Left rotation 25 (70)        Treatment     Meghna received the treatments listed below:      therapeutic exercises to develop strength, endurance, ROM, flexibility and posture for 45 minutes including:     Chin tucks: 10x5''  Seated Isometric cervical rotations: 10x5'' holds Bilateral   Seated Cervical rotations: 10x3'' holds Bilateral  Seated scapula squeezes: 15x5"   Supine horizontal abduction: 2x10 red theraband with towel roll along spine   Deep neck flexor nod: 15x5" holds  Deep neck flexor nod with lift off: 5x10" holds  Open books: 10x5" holds each    Prone scapular retraction: 15x5" holds  Prone scapular retraction with extension: 3x25"  Prone scapular retraction " "with T: 3x15"  Prone on elbow chin tuck: 15x5"   Quadruped thread the needed: 2x7 each (more difficult with right)    Theraband rows: 3x10 blue theraband  Shoulder external rotation: 2x10 red theraband Bilateral      Not today:  Snow angels with towel roll: 2x10  Butterflies with towel roll: x10  Chicken wings: 10x - not today     Meghna received the following manual therapy techniques: Joint mobilizations, Manual traction, Myofacial release, Manual Lymphatic Drainage, Soft tissue Mobilization and Friction Massage were applied to the: cervical spine for 15 minutes, including:    Manual cervical traction, 1 round  Cervical PROM    Not today:  Seated Grade V mid thoracic mobs  Prone Grade II-III mid thoracic and right costovertebral/P->A mobs   Soft tissue mobilization with hands and cups to right upper trap and down medial scapular border to improve soft tissue mobility, pain, posture and blood flow  Cervical sideglides, grade III-IV to upper and mid cervical spine    Patient Education and Home Exercises     Home Exercises Provided and Patient Education Provided     Education provided:   - keep exercises in a pain free range.  - perform home exercise program at the foot of her bed lying perpendicular to normal.    Written Home Exercises Provided: yes. Exercises were reviewed and Meghna was able to demonstrate them prior to the end of the session.  Meghna demonstrated good  understanding of the education provided. See EMR under Patient Instructions for exercises provided during therapy sessions    ASSESSMENT     Meghna is a 37 y.o. female referred to outpatient Physical Therapy with a medical diagnosis of Strain of neck muscle, initial encounter.  Patient continues to experience an increase in pain while working, possibly due to postural deficits.  Patient had no difficulty with addition of scapula squeezes and addition of towel roll along spine while performing supine exercises for postural correction.  Instructed " patient to perform scapula squeezes every hour or when she is feeling tight in her pecs.     Meghna Is progressing well towards her goals.   Pt prognosis is Excellent.     Pt will continue to benefit from skilled outpatient physical therapy to address the deficits listed in the problem list box on initial evaluation, provide pt/family education and to maximize pt's level of independence in the home and community environment.     Pt's spiritual, cultural and educational needs considered and pt agreeable to plan of care and goals.     Anticipated barriers to physical therapy: none.    GOALS:   Short Term Goals: 4 weeks  1. Pt will demo good deep neck flexor strength to improve cervical lordosis and stabilization. PROGRESSING; NOT MET  2. Increase cervical ROM to WNL in order to perform ADLs with decreased difficulty. MET 6/13/22  3. Pt will demo good sitting and standing posture for improved spine health and decreased neck pain. PROGRESSING; NOT MET  4. Pt to tolerate HEP to improve ROM and independence with ADL's. MET 6/3/22     Long Term Goals: 8 weeks  1. Report decreased neck pain </=1/10 to increase tolerance for heavy ADLs and all day of work. PROGRESSING; NOT MET  2. Increase cervical AROM to WFL with min neck pain for increased functional mobility. MET 6/13/22  3. Increased strength in B shoulders/scapular stabilizers to >/= 4/5 MMT grade to increase tolerance for ADL and work activities. PROGRESSING; NOT MET  4. Pt will report at </= 32% impaired on FOTO neck score for neck pain disability to demonstrate decrease in disability and improvement in neck pain. PROGRESSING; NOT MET    PLAN     Continue with Plan Of Care and progress toward therapist goals.    Moi Yates, PTA

## 2022-06-28 ENCOUNTER — CLINICAL SUPPORT (OUTPATIENT)
Dept: REHABILITATION | Facility: HOSPITAL | Age: 37
End: 2022-06-28
Payer: COMMERCIAL

## 2022-06-28 DIAGNOSIS — R29.898 DECREASED ROM OF NECK: ICD-10-CM

## 2022-06-28 DIAGNOSIS — R53.1 DECREASED STRENGTH: Primary | ICD-10-CM

## 2022-06-28 PROCEDURE — 97110 THERAPEUTIC EXERCISES: CPT | Mod: PN,CQ

## 2022-06-28 PROCEDURE — 97140 MANUAL THERAPY 1/> REGIONS: CPT | Mod: PN,CQ

## 2022-06-28 NOTE — PROGRESS NOTES
"[facility]   Physical Therapy Treatment Note     Name: Meghna Aragon  Clinic Number: [id number]    Therapy Diagnosis:   Encounter Diagnoses   Name Primary?    Decreased strength Yes    Decreased ROM of neck      Physician: Maude Fernandez MD    Visit Date: 6/28/2022    [copy and paste header fPhysician Orders: PT Eval and Treat  Medical Diagnosis from Referral: S16.1XXA (ICD-10-CM) - Strain of neck muscle, initial encounter  Evaluation Date: 5/17/2022  Authorization Period Expiration: 12/31/2022  Plan of Care Expiration: 7/15/22  Visit # / Visits authorized: 9/50  FOTO: 4/5 Performed on 6/13/22  PTA Visit #: 4/5     Time In: 10:00 AM  Time Out: 10:58 AM  Total Billable Time: 58 minutes (3 TE, 1 MT)     Precautions: Standard, HTN    SUBJECTIVE     Pt reports: her pain is not that bad today, 2/10.    She was compliant with home exercise program.  Response to previous treatment: no problems.   Functional change: not at this time.     Pain: 2/10  Location: bilateral neck      OBJECTIVE     6/13/22:    () = new measurements    AROM: CERVICAL   Flexion 34 (55)   Extension 42 (48)   Right side bending 38 (42)   Left side bending 35 (44)   Right rotation 28 (75)   Left rotation 25 (70)        Treatment     Meghna received the treatments listed below:      therapeutic exercises to develop strength, endurance, ROM, flexibility and posture for 50 minutes including:     Chin tucks: 10x5''  Seated Isometric cervical rotations: 10x5'' holds Bilateral   Seated Cervical rotations: 10x3'' holds Bilateral  Seated scapula squeezes: 20x5"   Supine pec stretch with vertical towel roll: 2 minutes   Supine horizontal abduction: 2x10 red theraband with towel roll along spine   Deep neck flexor nod: 15x5" holds  Deep neck flexor nod with lift off: 5x10" holds   Open books: 10x5" holds each    Prone scapular retraction: 15x5" holds  Prone scapular retraction with extension: 3x25"  Prone scapular " "retraction with T: 3x15"  Prone on elbow chin tuck: 15x5"   Quadruped thread the needed: 2x7 each (more difficult with right)    Theraband rows: 3x10 blue theraband  Shoulder external rotation: 2x10 red theraband Bilateral      Not today:  Snow angels with towel roll: 2x10  Butterflies with towel roll: x10  Chicken wings: 10x - not today     Meghna received the following manual therapy techniques: Joint mobilizations, Manual traction, Myofacial release, Manual Lymphatic Drainage, Soft tissue Mobilization and Friction Massage were applied to the: cervical spine for 8 minutes, including:    Manual cervical traction, 1 round  Cervical PROM    Not today:  Seated Grade V mid thoracic mobs  Prone Grade II-III mid thoracic and right costovertebral/P->A mobs   Soft tissue mobilization with hands and cups to right upper trap and down medial scapular border to improve soft tissue mobility, pain, posture and blood flow  Cervical sideglides, grade III-IV to upper and mid cervical spine    Patient Education and Home Exercises     Home Exercises Provided and Patient Education Provided     Education provided:   - keep exercises in a pain free range.  - perform home exercise program at the foot of her bed lying perpendicular to normal.    Written Home Exercises Provided: yes. Exercises were reviewed and Meghna was able to demonstrate them prior to the end of the session.  Meghna demonstrated good  understanding of the education provided. See EMR under Patient Instructions for exercises provided during therapy sessions    ASSESSMENT     Meghna is a 37 y.o. female referred to outpatient Physical Therapy with a medical diagnosis of Strain of neck muscle, initial encounter.  Patient continues to experience an increase in pain while working, possibly due to postural deficits.  Patient completed her therapy and had no difficulty with new exercise added as well as today's progressions, noted in bold, with no increase in symptoms prior to " leaving the clinic.      Meghna Is progressing well towards her goals.   Pt prognosis is Excellent.     Pt will continue to benefit from skilled outpatient physical therapy to address the deficits listed in the problem list box on initial evaluation, provide pt/family education and to maximize pt's level of independence in the home and community environment.     Pt's spiritual, cultural and educational needs considered and pt agreeable to plan of care and goals.     Anticipated barriers to physical therapy: none.    GOALS:   Short Term Goals: 4 weeks  1. Pt will demo good deep neck flexor strength to improve cervical lordosis and stabilization. PROGRESSING; NOT MET  2. Increase cervical ROM to WNL in order to perform ADLs with decreased difficulty. MET 6/13/22  3. Pt will demo good sitting and standing posture for improved spine health and decreased neck pain. PROGRESSING; NOT MET  4. Pt to tolerate HEP to improve ROM and independence with ADL's. MET 6/3/22     Long Term Goals: 8 weeks  1. Report decreased neck pain </=1/10 to increase tolerance for heavy ADLs and all day of work. PROGRESSING; NOT MET  2. Increase cervical AROM to WFL with min neck pain for increased functional mobility. MET 6/13/22  3. Increased strength in B shoulders/scapular stabilizers to >/= 4/5 MMT grade to increase tolerance for ADL and work activities. PROGRESSING; NOT MET  4. Pt will report at </= 32% impaired on FOTO neck score for neck pain disability to demonstrate decrease in disability and improvement in neck pain. PROGRESSING; NOT MET    PLAN     Continue with Plan Of Care and progress toward therapist goals.    Moi Yates, PTA

## 2022-07-05 ENCOUNTER — CLINICAL SUPPORT (OUTPATIENT)
Dept: REHABILITATION | Facility: HOSPITAL | Age: 37
End: 2022-07-05
Payer: COMMERCIAL

## 2022-07-05 DIAGNOSIS — R53.1 DECREASED STRENGTH: Primary | ICD-10-CM

## 2022-07-05 DIAGNOSIS — R29.898 DECREASED ROM OF NECK: ICD-10-CM

## 2022-07-05 PROCEDURE — 97140 MANUAL THERAPY 1/> REGIONS: CPT | Mod: PN

## 2022-07-05 PROCEDURE — 97110 THERAPEUTIC EXERCISES: CPT | Mod: PN

## 2022-07-05 NOTE — PROGRESS NOTES
"[facility]   Physical Therapy Treatment Note     [facility]  [facility] [id number]    Therapy Diagnosis:   Encounter Diagnoses   Name Primary?    Decreased strength Yes    Decreased ROM of neck      Physician: Maude Fernandez MD    Visit Date: 7/5/2022    [copy and paste header fPhysician Orders: PT Eval and Treat  Medical Diagnosis from Referral: S16.1XXA (ICD-10-CM) - Strain of neck muscle, initial encounter  Evaluation Date: 5/17/2022  Authorization Period Expiration: 12/31/2022  Plan of Care Expiration: 7/15/22  Visit # / Visits authorized: 10/50  FOTO: 10/10 - Next  PTA Visit #: 0/5     Time In: 9:00 AM  Time Out: 10:00 AM  Total Billable Time: 60 minutes (3 TE, 1 MT)     Precautions: Standard, HTN    SUBJECTIVE     Pt reports: pain is low and feels like she is getting much better. Can move her neck more with less pain.    She was compliant with home exercise program.  Response to previous treatment: no problems.   Functional change: not at this time.     Pain: 2/10  Location: bilateral neck      OBJECTIVE     6/13/22:    () = new measurements    AROM: CERVICAL   Flexion 34 (55)   Extension 42 (48)   Right side bending 38 (42)   Left side bending 35 (44)   Right rotation 28 (75)   Left rotation 25 (70)        Treatment     Meghna received the treatments listed below:      therapeutic exercises to develop strength, endurance, ROM, flexibility and posture for 50 minutes including:     Chin tucks: 10x10''  Seated Isometric cervical rotations: 10x5'' holds Bilateral    Supine pec stretch with vertical towel roll: 2 minutes   Supine horizontal abduction: 2x10 red theraband with towel roll along spine   Deep neck flexor nod with lift off: 8x10" holds   Open books: 10x5" holds each    Prone scapular retraction: 15x5" holds  Prone scapular retraction with extension: 5x20"  Prone scapular retraction with T: 2x5, 10"  Prone on elbow chin tuck with small lift: 15x5" "   Quadruped thread the needed: 12x each (more difficult with right)    Theraband rows: 3x10 blue theraband  Shoulder external rotation: 3x10 green theraband Bilateral     Meghna received the following manual therapy techniques: Joint mobilizations, Manual traction, Myofacial release, Manual Lymphatic Drainage, Soft tissue Mobilization and Friction Massage were applied to the: cervical spine for 10 minutes, including:    Manual cervical traction, 2 round  SOR  Cervical PROM    Patient Education and Home Exercises     Home Exercises Provided and Patient Education Provided   Education provided:   - keep exercises in a pain free range.  - perform home exercise program at the foot of her bed lying perpendicular to normal.    Written Home Exercises Provided: yes. Exercises were reviewed and Meghna was able to demonstrate them prior to the end of the session.  Meghna demonstrated good  understanding of the education provided. See EMR under Patient Instructions for exercises provided during therapy sessions    ASSESSMENT     Meghna is a 37 y.o. female referred to outpatient Physical Therapy with a medical diagnosis of Strain of neck muscle, initial encounter.  Patient having decrease in pain overall, and no pain with activities today. Pt progressing well and can move towards work related exercises to improve work pain. Full cervical range of motion present in supine, and she does still struggle with posterior chain activities where she is quick to fatigue.     Meghna Is progressing well towards her goals.   Pt prognosis is Excellent.     Pt will continue to benefit from skilled outpatient physical therapy to address the deficits listed in the problem list box on initial evaluation, provide pt/family education and to maximize pt's level of independence in the home and community environment.     Pt's spiritual, cultural and educational needs considered and pt agreeable to plan of care and goals.     Anticipated barriers to  physical therapy: none.    GOALS:   Short Term Goals: 4 weeks  1. Pt will demo good deep neck flexor strength to improve cervical lordosis and stabilization. PROGRESSING; NOT MET  2. Increase cervical ROM to WNL in order to perform ADLs with decreased difficulty. MET 6/13/22  3. Pt will demo good sitting and standing posture for improved spine health and decreased neck pain. PROGRESSING; NOT MET  4. Pt to tolerate HEP to improve ROM and independence with ADL's. MET 6/3/22     Long Term Goals: 8 weeks  1. Report decreased neck pain </=1/10 to increase tolerance for heavy ADLs and all day of work. PROGRESSING; NOT MET  2. Increase cervical AROM to WFL with min neck pain for increased functional mobility. MET 6/13/22  3. Increased strength in B shoulders/scapular stabilizers to >/= 4/5 MMT grade to increase tolerance for ADL and work activities. PROGRESSING; NOT MET  4. Pt will report at </= 32% impaired on FOTO neck score for neck pain disability to demonstrate decrease in disability and improvement in neck pain. PROGRESSING; NOT MET    PLAN     Continue with Plan Of Care and progress toward therapist goals.    Ludin Cardozo, PT

## 2022-07-12 ENCOUNTER — CLINICAL SUPPORT (OUTPATIENT)
Dept: REHABILITATION | Facility: HOSPITAL | Age: 37
End: 2022-07-12
Payer: COMMERCIAL

## 2022-07-12 DIAGNOSIS — R29.898 DECREASED ROM OF NECK: ICD-10-CM

## 2022-07-12 DIAGNOSIS — R53.1 DECREASED STRENGTH: Primary | ICD-10-CM

## 2022-07-12 PROCEDURE — 97140 MANUAL THERAPY 1/> REGIONS: CPT | Mod: PN,CQ

## 2022-07-12 PROCEDURE — 97110 THERAPEUTIC EXERCISES: CPT | Mod: PN,CQ

## 2022-07-12 NOTE — PROGRESS NOTES
"[facility]   Physical Therapy Treatment Note     Name: Meghna Aragon  Clinic Number: [id number]    Therapy Diagnosis:   Encounter Diagnoses   Name Primary?    Decreased strength Yes    Decreased ROM of neck      Physician: Maude Fernandez MD    Visit Date: 7/12/2022    [copy and paste header fPhysician Orders: PT Eval and Treat  Medical Diagnosis from Referral: S16.1XXA (ICD-10-CM) - Strain of neck muscle, initial encounter  Evaluation Date: 5/17/2022  Authorization Period Expiration: 12/31/2022  Plan of Care Expiration: 7/15/22  Visit # / Visits authorized: 11/50  FOTO: 11/10 - Next visit  PTA Visit #: 1/5     Time In: 9:00 AM  Time Out: 9:55 AM  Total Billable Time: 55 minutes (3 TE, 1 MT)     Precautions: Standard, HTN    SUBJECTIVE     Pt reports: her pain is not too bad today.    She was compliant with home exercise program.  Response to previous treatment: no problems.   Functional change: not at this time.     Pain: 2/10  Location: bilateral neck      OBJECTIVE     6/13/22:    () = new measurements    AROM: CERVICAL   Flexion 34 (55)   Extension 42 (48)   Right side bending 38 (42)   Left side bending 35 (44)   Right rotation 28 (75)   Left rotation 25 (70)        Treatment     Meghna received the treatments listed below:      therapeutic exercises to develop strength, endurance, ROM, flexibility and posture for 45 minutes including:     Chin tucks: 10x10''  Seated Isometric cervical rotations: 10x5'' holds Bilateral    Supine pec stretch with vertical towel roll: 3 minutes   Supine horizontal abduction: 2x10 red theraband with towel roll along spine   Deep neck flexor nod with lift off: 8x10" holds   Open books: 10x5" holds each    Prone scapular retraction: 15x5" holds  Prone scapular retraction with extension: 5x20"  Prone scapular retraction with T: 2x5, 10"  Prone on elbow chin tuck with small lift: 15x5"   Quadruped thread the needed: 12x each (more difficult " with right)    Theraband rows: 3x10 blue theraband  Shoulder external rotation: 3x10 green theraband Bilateral     Meghna received the following manual therapy techniques: Joint mobilizations, Manual traction, Myofacial release, Manual Lymphatic Drainage, Soft tissue Mobilization and Friction Massage were applied to the: cervical spine for 10 minutes, including:    Manual cervical traction, 2 round  SOR  Cervical PROM    Patient Education and Home Exercises     Home Exercises Provided and Patient Education Provided   Education provided:   - keep exercises in a pain free range.  - perform home exercise program at the foot of her bed lying perpendicular to normal.    Written Home Exercises Provided: yes. Exercises were reviewed and Meghna was able to demonstrate them prior to the end of the session.  Meghna demonstrated good  understanding of the education provided. See EMR under Patient Instructions for exercises provided during therapy sessions    ASSESSMENT     Meghna is a 37 y.o. female referred to outpatient Physical Therapy with a medical diagnosis of Strain of neck muscle, initial encounter.  Patient continues to fatigue easily with some exercises but is improving with prone exercises as she does not require rest breaks while performing them.  Patient had no difficulty with today's progressions noted in bold.      Meghna Is progressing well towards her goals.   Pt prognosis is Excellent.     Pt will continue to benefit from skilled outpatient physical therapy to address the deficits listed in the problem list box on initial evaluation, provide pt/family education and to maximize pt's level of independence in the home and community environment.     Pt's spiritual, cultural and educational needs considered and pt agreeable to plan of care and goals.     Anticipated barriers to physical therapy: none.    GOALS:   Short Term Goals: 4 weeks  1. Pt will demo good deep neck flexor strength to improve cervical lordosis  and stabilization. PROGRESSING; NOT MET  2. Increase cervical ROM to WNL in order to perform ADLs with decreased difficulty. MET 6/13/22  3. Pt will demo good sitting and standing posture for improved spine health and decreased neck pain. PROGRESSING; NOT MET  4. Pt to tolerate HEP to improve ROM and independence with ADL's. MET 6/3/22     Long Term Goals: 8 weeks  1. Report decreased neck pain </=1/10 to increase tolerance for heavy ADLs and all day of work. PROGRESSING; NOT MET  2. Increase cervical AROM to WFL with min neck pain for increased functional mobility. MET 6/13/22  3. Increased strength in B shoulders/scapular stabilizers to >/= 4/5 MMT grade to increase tolerance for ADL and work activities. PROGRESSING; NOT MET  4. Pt will report at </= 32% impaired on FOTO neck score for neck pain disability to demonstrate decrease in disability and improvement in neck pain. PROGRESSING; NOT MET    PLAN     Continue with Plan Of Care and progress toward therapist goals.    Moi aYtes, PTA

## 2022-07-19 ENCOUNTER — CLINICAL SUPPORT (OUTPATIENT)
Dept: REHABILITATION | Facility: HOSPITAL | Age: 37
End: 2022-07-19
Payer: COMMERCIAL

## 2022-07-19 DIAGNOSIS — R53.1 DECREASED STRENGTH: Primary | ICD-10-CM

## 2022-07-19 DIAGNOSIS — R29.898 DECREASED ROM OF NECK: ICD-10-CM

## 2022-07-19 PROCEDURE — 97110 THERAPEUTIC EXERCISES: CPT | Mod: PN | Performed by: PHYSICAL THERAPIST

## 2022-07-19 NOTE — PROGRESS NOTES
[facility]   Physical Therapy Treatment and DISCHARGE Note     Name: Meghna Aragon  Clinic Number: [id number]    Therapy Diagnosis:   Encounter Diagnoses   Name Primary?    Decreased strength Yes    Decreased ROM of neck      Physician: Maude Fernandez MD    Visit Date: 7/19/2022    [copy and paste header fPhysician Orders: PT Eval and Treat  Medical Diagnosis from Referral: S16.1XXA (ICD-10-CM) - Strain of neck muscle, initial encounter  Evaluation Date: 5/17/2022  Authorization Period Expiration: 12/31/2022  Plan of Care Expiration: 7/15/22  Visit # / Visits authorized: 12/50  FOTO: 11/10 - Performed today  PTA Visit #: 0/5     Time In: 10:40 AM  Time Out: 11:30 AM  Total Billable Time: 46 minutes (3 TE)     Precautions: Standard, HTN    SUBJECTIVE     Pt reports: she worked Friday and Monday and pain wasn't bad. Yesterday evening, she had tingling in her right forearm that was short-lived. These improvements have been for a couple days.     She was compliant with home exercise program.  Response to previous treatment: no problems.   Functional change: not at this time.     Pain: 1/10  Location: bilateral neck      OBJECTIVE     6/13/22:    () = new measurements    AROM: CERVICAL   Flexion 34 (55)   Extension 42 (66)   Right side bending 38 (48)   Left side bending 35 (52)   Right rotation 28 (78)   Left rotation 25 (78)         Shoulder   Right     Left   Pain/Dysfunction with Movement     AROM PROM MMT AROM PROM MMT     Upper trap (C4) WFL WFL 4+/5 WFL WFL 4+/5     Biceps (C5,6) WFL WFL 5/5 WFL WFL 5/5     Wrist ext (C6) WFL WFL 5/5 WFL WFL 5/5     Triceps (C7) WFL WFL 5/5 WFL WFL 5/5     Wrist flexion (C7) WFL WFL 5/5 WFL WFL 5/5      strength and thumb ext (C8) WFL WFL 5/5 WFL WFL 5/5     Finger abd/add (T1) WFL WFL 5/5 WFL WFL 5/5     Serratus anterior WFL WFL 4/5 WFL WFL 4+/5     flexion WFL WFL 5/5 WFL WFL 5/5     extension WFL WFL 4+/5 WFL WFL 4+/5     Abduction  "(C5) WFL WFL 4/5 WFL WFL 4+/5     adduction WFL WFL 4+/5 WFL WFL 4+/5     Internal rotation WFL WFL 5/5 WFL WFL 5/5     ER at 90° abd WFL WFL NT WFL WFL NT     ER at 0° abd WFL WFL 4+/5 WFL WFL 4+/5     Middle trap WFL WFL NT WFL WFL NT     Lower trap WFL WFL NT WFL WFL NT        Spurlings: (-)  deep neck flexor liftoff: 13 seconds    Treatment     Meghna received the treatments listed below:      therapeutic exercises to develop strength, endurance, ROM, flexibility and posture for 46 minutes including re-assessment and FOTO:     Doorway pec stretch: 3x20"  Theraband rows: 3x10 blue theraband  Shoulder external rotation: 3x10 green theraband Bilateral  theraband horizontal abd with chin tuck and nod: red theraband 20x       Patient Education and Home Exercises     Home Exercises Provided and Patient Education Provided   Education provided:   - keep exercises in a pain free range.  - perform home exercise program at the foot of her bed lying perpendicular to normal.    Written Home Exercises Provided: yes. Exercises were reviewed and Meghna was able to demonstrate them prior to the end of the session.  Meghna demonstrated good  understanding of the education provided. See EMR under Patient Instructions for exercises provided during therapy sessions    ASSESSMENT     Meghna is a 37 y.o. female referred to outpatient Physical Therapy with a medical diagnosis of Strain of neck muscle, initial encounter.  Patient reports mainly only mild pain, even after work. Subjective improvements seen via FOTO score past goal. Still mild postural deficits, but improved from evaluation and patient able to correct. She's compliant with home exercise program and received an updated version today. Her cervical and shoulder range of motion is WNL and pain free and strength has significantly improved. She is appropriate and agreeable to discharge at this time.    Meghna Is progressing well towards her goals.   Pt prognosis is Excellent. "     Pt will continue to benefit from skilled outpatient physical therapy to address the deficits listed in the problem list box on initial evaluation, provide pt/family education and to maximize pt's level of independence in the home and community environment.     Pt's spiritual, cultural and educational needs considered and pt agreeable to plan of care and goals.     Anticipated barriers to physical therapy: none.    GOALS:   Short Term Goals: 4 weeks  1. Pt will demo good deep neck flexor strength to improve cervical lordosis and stabilization. NOT MET (fair)  2. Increase cervical ROM to WNL in order to perform ADLs with decreased difficulty. MET 6/13/22  3. Pt will demo good sitting and standing posture for improved spine health and decreased neck pain.  MET  4. Pt to tolerate HEP to improve ROM and independence with ADL's. MET 6/3/22     Long Term Goals: 8 weeks  1. Report decreased neck pain </=1/10 to increase tolerance for heavy ADLs and all day of work. NOT MET  2. Increase cervical AROM to WFL with min neck pain for increased functional mobility. MET 6/13/22  3. Increased strength in B shoulders/scapular stabilizers to >/= 4/5 MMT grade to increase tolerance for ADL and work activities. MET  4. Pt will report at </= 32% impaired on FOTO neck score for neck pain disability to demonstrate decrease in disability and improvement in neck pain. MET    PLAN     DISCHARGE to [facility]    Lobito Solorzano, PT

## 2022-07-25 ENCOUNTER — TELEPHONE (OUTPATIENT)
Dept: BARIATRICS | Facility: CLINIC | Age: 37
End: 2022-07-25
Payer: COMMERCIAL

## 2022-07-28 ENCOUNTER — TELEPHONE (OUTPATIENT)
Dept: BARIATRICS | Facility: CLINIC | Age: 37
End: 2022-07-28
Payer: COMMERCIAL

## 2022-10-06 ENCOUNTER — PATIENT MESSAGE (OUTPATIENT)
Dept: BARIATRICS | Facility: CLINIC | Age: 37
End: 2022-10-06
Payer: COMMERCIAL

## 2022-10-18 ENCOUNTER — OFFICE VISIT (OUTPATIENT)
Dept: BARIATRICS | Facility: CLINIC | Age: 37
End: 2022-10-18
Payer: COMMERCIAL

## 2022-10-18 VITALS
BODY MASS INDEX: 39.85 KG/M2 | SYSTOLIC BLOOD PRESSURE: 144 MMHG | HEART RATE: 79 BPM | OXYGEN SATURATION: 98 % | DIASTOLIC BLOOD PRESSURE: 77 MMHG | WEIGHT: 210.88 LBS

## 2022-10-18 DIAGNOSIS — I10 HYPERTENSION, BENIGN: ICD-10-CM

## 2022-10-18 DIAGNOSIS — Z71.3 ENCOUNTER FOR WEIGHT LOSS COUNSELING: ICD-10-CM

## 2022-10-18 DIAGNOSIS — E66.01 CLASS 2 SEVERE OBESITY DUE TO EXCESS CALORIES WITH SERIOUS COMORBIDITY AND BODY MASS INDEX (BMI) OF 39.0 TO 39.9 IN ADULT: Primary | ICD-10-CM

## 2022-10-18 PROCEDURE — 99999 PR PBB SHADOW E&M-EST. PATIENT-LVL IV: CPT | Mod: PBBFAC,,, | Performed by: STUDENT IN AN ORGANIZED HEALTH CARE EDUCATION/TRAINING PROGRAM

## 2022-10-18 PROCEDURE — 99204 PR OFFICE/OUTPT VISIT, NEW, LEVL IV, 45-59 MIN: ICD-10-PCS | Mod: S$GLB,,, | Performed by: STUDENT IN AN ORGANIZED HEALTH CARE EDUCATION/TRAINING PROGRAM

## 2022-10-18 PROCEDURE — 99999 PR PBB SHADOW E&M-EST. PATIENT-LVL IV: ICD-10-PCS | Mod: PBBFAC,,, | Performed by: STUDENT IN AN ORGANIZED HEALTH CARE EDUCATION/TRAINING PROGRAM

## 2022-10-18 PROCEDURE — 99204 OFFICE O/P NEW MOD 45 MIN: CPT | Mod: S$GLB,,, | Performed by: STUDENT IN AN ORGANIZED HEALTH CARE EDUCATION/TRAINING PROGRAM

## 2022-10-18 RX ORDER — SEMAGLUTIDE 1.34 MG/ML
0.5 INJECTION, SOLUTION SUBCUTANEOUS
Qty: 1 PEN | Refills: 2 | Status: SHIPPED | OUTPATIENT
Start: 2022-10-18 | End: 2023-01-06 | Stop reason: SDUPTHER

## 2022-10-18 NOTE — PATIENT INSTRUCTIONS
"PLEASE BE AT LEAST 15 MINUTES EARLY FOR YOUR NEXT APPOINTMENT.  Late arrivals WILL BE TURNED AWAY AND ASKED TO RESCHEDULE.  YOU MUST COME EARLY TO ALLOW TIME FOR CHECK-IN AS WELL AS GET YOUR VITAL SIGNS AND GO OVER YOUR MEDICATIONS.  Tardiness is not fair to the patients who present after you and are on time for their appointments.  It causes a delay in the appointments for patients and staff.  YOU MAY ALSO BE DISCHARGED FROM CLINIC with multiple late arrivals or "No Show" appointments.      Start Ozempic once a week. Start with 0.25mg once a week x 4 weeks, then 0.5 mg weekly.     Decrease portions as soon as you start Ozempic. Avoid fried, greasy, fatty foods.     Some nausea in the first 2 weeks is not unusual.     If you get pain across the upper abdomen and around to your back, please call the office.     [email or web address] Bits for coupon/videos.       Food and Beverage Log:  Keep a log of all food and beverages that you consume.  Keeping track of calories will help you lose weight, because monitoring will help you become more aware of what you are eating and recognize your eating patterns.  Be mindful of your hunger level before eating and your satiety level after eating.  Just keeping records will help you make healthy changes in your diet and eat fewer calories.    Tips for keeping a calorie count:     Each day, aim for the daily calorie goal of 1200 calories/day     Record your food intake immediately after eating. If possible, look up calories before or immediately after eating.     Download an jose like [employer] Fitness Pal, Lose It!, or OneClass (Code: 24677) To keep track of your calories.    Measuring foods (using measuring cups or spoons) will help you be more accurate with counting calories.     Keep a running calorie count throughout the day.     Count daily calories toward a weekly calorie total. If you eat too many calories one day, cut back slightly over the next few days to meet your weekly goal. "         Meal Planning & Grocery Shopping    Meal planning builds the foundation for healthy eating. When you have structured ideas for healthy meals and foods available at home to prepare those meals, weight control becomes easier.  If only healthy foods are available at home, then you will be much more likely to eat healthy foods. And you will be less likely to go to a restaurant or  a fast food meal, which tend to be unhealthy and higher in calories than meals prepared at home.      Take 5-10 minutes each week to plan meals for the next 7 days.  Make a grocery list based on the meal plan.    Grocery Shopping Tips:  Shop on a full stomach.  Schedule your shopping for times when you are most motivated and able to be disciplined about your purchases. For example, after a stressful day at work it may be difficult to make the healthiest choices. Shopping at other times, such as early in the morning or after dinner, may be easier.  Focus your shopping on the outside aisles of the store, which tend to contain more fresh foods and lower calorie foods. The inside aisles tend to have more processed foods.  Stick to your list. Avoid buying unhealthy items just because they are on sale.   Compare nutrition labels to check the number of calories and percentage of fat.      What to buy:    Vegetables  Fresh vegetables  Frozen vegetables with no sauce or added salt  Canned vegetables with no sauce or added salt    Protein  Lean meats, such as chicken and turkey  Limit red meats, such as beef to no more than 1x/week  Limit processed meats, such as cold cuts, antonio, sausage, and hot dogs. Look for brands that have no nitrites and are minimally processed. Consider turkey sausage or turkey antonio.  Fish and Shellfish  Eggs  Dried beans  Canned beans (reduced sodium)    Fat  Use healthy oils, such as olive oil or canola oil, for cooking, salad dressings, etc.  Unflavored nuts and seeds  Nut butters (no added  sugar)    Dairy  Yogurt (no sugar added)  Cheese  Low-fat milk  Unsweetened nondairy milks (almond milk, soy milk, etc)    Fruit  Fresh Fruit  Frozen fruit with no added sugar  Canned fruit with no added sugar  Dried fruit with no added sugar  100% fruit juice    Whole Grains  Single ingredient grains, such as oats, quinoa, brown rice  Whole-wheat pasta  Sprouted whole-grain bread    What to avoid:  - Avoid fried foods  - Avoid foods with added sugar  - Avoid sugar-sweetened beverages  - Avoid ultra-processed foods            Copyright © 2011, [facility]. For more information about The Healthy Eating Plate, please see The Nutrition Source, Department of Nutrition, [employer], www.thenutritionsource.org, and [employer], www.health.[employer].edu.      Lifestyle Activity    Lifestyle activity consists of all the activities that burn calories during the course of a normal day. Using the stairs, washing the dishes, or even getting up to turn off the television are all examples of lifestyle activity. All activities, no matter how small, burn calories. Increasing lifestyle activity can help with weight control, so building physical activity into your everyday routine is important.     A pedometer is a tool that can help you track how much lifestyle activity you are getting. It can help you stay active. A pedometer counts each step you take and displays your total steps on the screen. Many smartphones, including iPhones and Androids, have applications that automatically count your steps. If you decide to use this method, make sure you are holding or wearing your smartphone so it can count all of your steps.     During the next 2 weeks, aim for 5,000 or more steps per day. Each week aim to increase your daily step goal by 250 so that you will reach an average of 10,000 steps per day (equal to walking 4 to 5 miles).     Start making more active choices in your routine in  order to increase the amount of walking you do each day.     Strategies to Increase Lifestyle Activity:    Take several 5-10-minute walks during the day.   Set a reminder to take a 5 minute break from your desk every hour.   Choose the farthest entrance to a building that you are entering.   Host walking meetings at work.   Walk down the robledo to contact co-workers face-to-face, instead of emailing or calling.  Walk to a restroom, water cooler, or copy machine on a different floor at work.   Walk during your lunch break.   Park farther away in parking lots.   Get off the bus or train earlier and walk farther to your destination.   Take the stairs rather than the elevator or the escalator.   Start a walking club with co-workers or neighbors.   Walk - don't drive - for trips less than one mile.   Take an after-dinner walk with family.   Go for a walk while talking on your wireless phone.   Walk the dog more often.   If you prefer to stay indoors because of the weather, try walking in a shopping mall or doing laps around a large store.   Purchase a treadmill to use at home.   Schedule time for walking every week and stick to it like any other appointment.   Or think of your own strategy: _______________________________       Physical Activity    Physical activity improves your health and helps with weight control, especially weight loss maintenance.      When starting to incorporate an exercise routine into your day, it is helpful to set specific goals.  For example, I will walk at moderate intensity from 12:30-12:45pm on Monday, Wednesday, and Friday.  Schedule your exercise time into your calendar.  Think of any potential barriers that may come up and prevent you from exercising.  Develop solutions or back-up plans for when these barriers may arise.    Walking is an ideal activity to start with if you do not currently have an exercise routine. You can make the activity more fun by doing it with a friend or  listening to music or a book on tape while you do it. If you don't enjoy walking, think of other activities you like, such as bike riding or swimming.  Other alternatives include group fitness classes or exercise at home using DVDs, Apps, etc.    If you are new to exercising, then start with 10 minutes 3-4 days per week.  After two weeks, increase to 15 minutes 3-4 days per week.  If you already exercise more than this, continue at your higher level, or increase by 10-15 minutes if able.         Aerobic Activity  Aerobic Activity gets you breathing harder and your heart beating faster.  Eventually, you should work up to 150 - 300 minutes of moderate-intensity aerobic activity every week or 75 - 150 minutes of vigorous-intensity aerobic activity every week.  An easy way to gauge the intensity of your activity is with the Talk Test.  During moderate activity, you should be able to talk, but not sing without having to pause for a breath.  During vigorous activity, you shouldn't be able to say more than a few words without pausing for a breath.  You should not push yourself until you are completely out of breath, tired, or sore.    Examples of Aerobic Activity:  Walking  Running  Swimming  Biking  Playing sports like tennis or basketball  Dancing  Jumping Rope      Strength Training  It is also recommended that you perform muscle-strengthening activities at least 2 days per week.  Be sure to include activities that work all major muscle groups (legs, arms, abdomen, back, buttocks, chest, and shoulders)    Examples of Strength Training  Lifting weights  Working with resistance band  Using your body weight for resistance (squats, push-ups, sit-ups)  Sruthiates  Yoga      https://health.gov/moveyourway/activity-planner/      Remember to keep a record of your activity to track your progress.

## 2022-10-18 NOTE — PROGRESS NOTES
Subjective:       Patient ID: Meghna Aragon is a 37 y.o. female.    Chief Complaint: Consult and Obesity    Patient presents for treatment of obesity.   Has tried WW, keto, portion control; loses weight but can't keep it off    Co-morbidities:   HTN  Thyroid Nodule - U/S 11/2021: Two small subcentimeter nodules which do not meet ACR TI rad criteria for fine-needle aspiration or follow-up.    Negative for thyroid cancer    Current Physical Activity  No regular exercise routine    Current Eating Habits  Breakfast - egg, antonio, and cheese sandwich; protein shake; on weekends, grits, egg, and antonio  Lunch - gumbo, grilled or fried chicken sandwiches; red beans and rice  Dinner - wings, brisket, pulled pork, salads, green beans, peas  Snacks - chips, cookies, ice cream for dessert  Beverages    Medical Weight Loss  10/18/2022: 210.9 lbs, BMI 36.8, BFP 41.2%, BFM 86.8 lbs, SMM 69.2 lbs, BMR 1586 kcal    Review of Systems   Constitutional:  Negative for chills and fever.   Respiratory:  Negative for shortness of breath.    Cardiovascular:  Negative for chest pain and palpitations.   Gastrointestinal:  Negative for abdominal pain, nausea and vomiting.   Neurological:  Negative for dizziness and light-headedness.   Psychiatric/Behavioral:  The patient is not nervous/anxious.        Objective:       Latest Reference Range & Units 10/23/21 08:50   WBC 3.90 - 12.70 K/uL 6.44   RBC 4.00 - 5.40 M/uL 4.36   Hemoglobin 12.0 - 16.0 g/dL 11.6 (L)   Hematocrit 37.0 - 48.5 % 37.1   MCV 82 - 98 fL 85   MCH 27.0 - 31.0 pg 26.6 (L)   MCHC 32.0 - 36.0 g/dL 31.3 (L)   RDW 11.5 - 14.5 % 13.8   Platelets 150 - 450 K/uL 278   MPV 9.2 - 12.9 fL 11.4   Gran % 38.0 - 73.0 % 64.1   Lymph % 18.0 - 48.0 % 26.6   Mono % 4.0 - 15.0 % 7.6   Eosinophil % 0.0 - 8.0 % 1.1   Basophil % 0.0 - 1.9 % 0.3   Immature Granulocytes 0.0 - 0.5 % 0.3   Gran # (ANC) 1.8 - 7.7 K/uL 4.1   Lymph # 1.0 - 4.8 K/uL 1.7   Mono # 0.3 - 1.0 K/uL 0.5   Eos # 0.0 - 0.5  K/uL 0.1   Baso # 0.00 - 0.20 K/uL 0.02   Immature Grans (Abs) 0.00 - 0.04 K/uL 0.02   nRBC 0 /100 WBC 0   Differential Method  Automated   Iron 30 - 160 ug/dL 37   TIBC 250 - 450 ug/dL 422   Saturated Iron 20 - 50 % 9 (L)   Transferrin 200 - 375 mg/dL 285   Ferritin 20.0 - 300.0 ng/mL 25   Sodium 136 - 145 mmol/L 139   Potassium 3.5 - 5.1 mmol/L 3.9   Chloride 95 - 110 mmol/L 107   CO2 23 - 29 mmol/L 26   Anion Gap 8 - 16 mmol/L 6 (L)   BUN 6 - 20 mg/dL 11   Creatinine 0.5 - 1.4 mg/dL 0.8   eGFR if non African American >60 mL/min/1.73 m^2 >60   eGFR if African American >60 mL/min/1.73 m^2 >60   Glucose 70 - 110 mg/dL 103   Calcium 8.7 - 10.5 mg/dL 8.9   Alkaline Phosphatase 55 - 135 U/L 71   PROTEIN TOTAL 6.0 - 8.4 g/dL 7.1   Albumin 3.5 - 5.2 g/dL 3.7   BILIRUBIN TOTAL 0.1 - 1.0 mg/dL 0.1   AST 10 - 40 U/L 12   ALT 10 - 44 U/L 13   Cholesterol 120 - 199 mg/dL 232 (H)   HDL 40 - 75 mg/dL 49   HDL/Cholesterol Ratio 20.0 - 50.0 % 21.1   LDL Cholesterol External 63.0 - 159.0 mg/dL 163.0 (H)   Non-HDL Cholesterol mg/dL 183   Total Cholesterol/HDL Ratio 2.0 - 5.0  4.7   Triglycerides 30 - 150 mg/dL 100   Hemoglobin A1C External 4.0 - 5.6 % 5.7 (H)   Estimated Avg Glucose 68 - 131 mg/dL 117   TSH 0.400 - 4.000 uIU/mL 0.907   (L): Data is abnormally low  (H): Data is abnormally high    Vitals:    10/18/22 1420   BP: (!) 144/77   Pulse: 79       Physical Exam  Vitals reviewed.   Constitutional:       General: She is not in acute distress.     Appearance: Normal appearance. She is obese. She is not ill-appearing, toxic-appearing or diaphoretic.   HENT:      Head: Normocephalic and atraumatic.   Cardiovascular:      Rate and Rhythm: Normal rate.   Pulmonary:      Effort: Pulmonary effort is normal. No respiratory distress.   Skin:     General: Skin is warm and dry.   Neurological:      Mental Status: She is alert and oriented to person, place, and time.      Gait: Gait normal.   Psychiatric:         Mood and Affect: Mood  normal.         Behavior: Behavior normal.       Assessment:       Problem List Items Addressed This Visit       Hypertension, benign     Other Visit Diagnoses       Class 2 severe obesity due to excess calories with serious comorbidity and body mass index (BMI) of 39.0 to 39.9 in adult    -  Primary    Encounter for weight loss counseling                Plan:   - Start Ozempic once a week. Start with 0.25mg once a week x 4 weeks, then 0.5 mg weekly.     - Log all food and beverage intake with a daily calorie goal of 1200 calories per day    - Moderate intensity aerobic exercise for 150 minutes per week

## 2022-12-04 ENCOUNTER — PATIENT MESSAGE (OUTPATIENT)
Dept: BARIATRICS | Facility: CLINIC | Age: 37
End: 2022-12-04
Payer: COMMERCIAL

## 2023-01-06 ENCOUNTER — OFFICE VISIT (OUTPATIENT)
Dept: BARIATRICS | Facility: CLINIC | Age: 38
End: 2023-01-06
Payer: COMMERCIAL

## 2023-01-06 VITALS
WEIGHT: 201.81 LBS | HEIGHT: 61 IN | OXYGEN SATURATION: 99 % | DIASTOLIC BLOOD PRESSURE: 86 MMHG | BODY MASS INDEX: 38.1 KG/M2 | SYSTOLIC BLOOD PRESSURE: 116 MMHG | HEART RATE: 87 BPM

## 2023-01-06 DIAGNOSIS — E66.01 CLASS 2 SEVERE OBESITY DUE TO EXCESS CALORIES WITH SERIOUS COMORBIDITY AND BODY MASS INDEX (BMI) OF 38.0 TO 38.9 IN ADULT: Primary | ICD-10-CM

## 2023-01-06 DIAGNOSIS — Z71.3 ENCOUNTER FOR WEIGHT LOSS COUNSELING: ICD-10-CM

## 2023-01-06 DIAGNOSIS — I10 HYPERTENSION, BENIGN: ICD-10-CM

## 2023-01-06 PROCEDURE — 99213 OFFICE O/P EST LOW 20 MIN: CPT | Mod: S$GLB,,, | Performed by: STUDENT IN AN ORGANIZED HEALTH CARE EDUCATION/TRAINING PROGRAM

## 2023-01-06 PROCEDURE — 99999 PR PBB SHADOW E&M-EST. PATIENT-LVL III: ICD-10-PCS | Mod: PBBFAC,,, | Performed by: STUDENT IN AN ORGANIZED HEALTH CARE EDUCATION/TRAINING PROGRAM

## 2023-01-06 PROCEDURE — 99213 PR OFFICE/OUTPT VISIT, EST, LEVL III, 20-29 MIN: ICD-10-PCS | Mod: S$GLB,,, | Performed by: STUDENT IN AN ORGANIZED HEALTH CARE EDUCATION/TRAINING PROGRAM

## 2023-01-06 PROCEDURE — 99999 PR PBB SHADOW E&M-EST. PATIENT-LVL III: CPT | Mod: PBBFAC,,, | Performed by: STUDENT IN AN ORGANIZED HEALTH CARE EDUCATION/TRAINING PROGRAM

## 2023-01-06 RX ORDER — SEMAGLUTIDE 1.34 MG/ML
0.5 INJECTION, SOLUTION SUBCUTANEOUS
Qty: 1 PEN | Refills: 2 | Status: SHIPPED | OUTPATIENT
Start: 2023-01-06 | End: 2023-03-15

## 2023-01-06 NOTE — PROGRESS NOTES
Subjective:       Patient ID: Meghna Aragon is a 37 y.o. female.    Chief Complaint: Follow-up, Obesity, and Weight Check      Patient presents for treatment of obesity.   Has tried WW, keto, portion control; loses weight but can't keep it off    Co-morbidities:   HTN  Thyroid Nodule - U/S 11/2021: Two small subcentimeter nodules which do not meet ACR TI rad criteria for fine-needle aspiration or follow-up.    Negative for thyroid cancer    Current Physical Activity  No regular exercise routine    Current Eating Habits  Breakfast - egg, antonio, and cheese sandwich; protein shake; on weekends, grits, egg, and antonio  Lunch - gumbo, grilled or fried chicken sandwiches; red beans and rice  Dinner - wings, brisket, pulled pork, salads, green beans, peas  Snacks - chips, cookies, ice cream for dessert  Beverages    Medical Weight Loss  10/18/2022: 210.9 lbs, BMI 36.8, BFP 41.2%, BFM 86.8 lbs, SMM 69.2 lbs, BMR 1586 kcal  1/6/2023: 201.8 lbs, BMI 35.2, BFP 40.4%, BFM 81.7 lbs, SMM 67 lbs, BMR 1548 kcal      Review of Systems   Constitutional:  Negative for chills and fever.   Respiratory:  Negative for shortness of breath.    Cardiovascular:  Negative for chest pain and palpitations.   Gastrointestinal:  Negative for abdominal pain, nausea and vomiting.   Neurological:  Negative for dizziness and light-headedness.   Psychiatric/Behavioral:  The patient is not nervous/anxious.        Objective:       Latest Reference Range & Units 10/23/21 08:50   WBC 3.90 - 12.70 K/uL 6.44   RBC 4.00 - 5.40 M/uL 4.36   Hemoglobin 12.0 - 16.0 g/dL 11.6 (L)   Hematocrit 37.0 - 48.5 % 37.1   MCV 82 - 98 fL 85   MCH 27.0 - 31.0 pg 26.6 (L)   MCHC 32.0 - 36.0 g/dL 31.3 (L)   RDW 11.5 - 14.5 % 13.8   Platelets 150 - 450 K/uL 278   MPV 9.2 - 12.9 fL 11.4   Gran % 38.0 - 73.0 % 64.1   Lymph % 18.0 - 48.0 % 26.6   Mono % 4.0 - 15.0 % 7.6   Eosinophil % 0.0 - 8.0 % 1.1   Basophil % 0.0 - 1.9 % 0.3   Immature Granulocytes 0.0 - 0.5 % 0.3   Gran  # (ANC) 1.8 - 7.7 K/uL 4.1   Lymph # 1.0 - 4.8 K/uL 1.7   Mono # 0.3 - 1.0 K/uL 0.5   Eos # 0.0 - 0.5 K/uL 0.1   Baso # 0.00 - 0.20 K/uL 0.02   Immature Grans (Abs) 0.00 - 0.04 K/uL 0.02   nRBC 0 /100 WBC 0   Differential Method  Automated   Iron 30 - 160 ug/dL 37   TIBC 250 - 450 ug/dL 422   Saturated Iron 20 - 50 % 9 (L)   Transferrin 200 - 375 mg/dL 285   Ferritin 20.0 - 300.0 ng/mL 25   Sodium 136 - 145 mmol/L 139   Potassium 3.5 - 5.1 mmol/L 3.9   Chloride 95 - 110 mmol/L 107   CO2 23 - 29 mmol/L 26   Anion Gap 8 - 16 mmol/L 6 (L)   BUN 6 - 20 mg/dL 11   Creatinine 0.5 - 1.4 mg/dL 0.8   eGFR if non African American >60 mL/min/1.73 m^2 >60   eGFR if African American >60 mL/min/1.73 m^2 >60   Glucose 70 - 110 mg/dL 103   Calcium 8.7 - 10.5 mg/dL 8.9   Alkaline Phosphatase 55 - 135 U/L 71   PROTEIN TOTAL 6.0 - 8.4 g/dL 7.1   Albumin 3.5 - 5.2 g/dL 3.7   BILIRUBIN TOTAL 0.1 - 1.0 mg/dL 0.1   AST 10 - 40 U/L 12   ALT 10 - 44 U/L 13   Cholesterol 120 - 199 mg/dL 232 (H)   HDL 40 - 75 mg/dL 49   HDL/Cholesterol Ratio 20.0 - 50.0 % 21.1   LDL Cholesterol External 63.0 - 159.0 mg/dL 163.0 (H)   Non-HDL Cholesterol mg/dL 183   Total Cholesterol/HDL Ratio 2.0 - 5.0  4.7   Triglycerides 30 - 150 mg/dL 100   Hemoglobin A1C External 4.0 - 5.6 % 5.7 (H)   Estimated Avg Glucose 68 - 131 mg/dL 117   TSH 0.400 - 4.000 uIU/mL 0.907   (L): Data is abnormally low  (H): Data is abnormally high    Vitals:    01/06/23 1547   BP: 116/86   Pulse: 87         Physical Exam  Vitals reviewed.   Constitutional:       General: She is not in acute distress.     Appearance: Normal appearance. She is obese. She is not ill-appearing, toxic-appearing or diaphoretic.   HENT:      Head: Normocephalic and atraumatic.   Cardiovascular:      Rate and Rhythm: Normal rate.   Pulmonary:      Effort: Pulmonary effort is normal. No respiratory distress.   Skin:     General: Skin is warm and dry.   Neurological:      Mental Status: She is alert and oriented  to person, place, and time.      Gait: Gait normal.   Psychiatric:         Mood and Affect: Mood normal.         Behavior: Behavior normal.       Assessment:       Problem List Items Addressed This Visit       Hypertension, benign     Other Visit Diagnoses       Class 2 severe obesity due to excess calories with serious comorbidity and body mass index (BMI) of 38.0 to 38.9 in adult    -  Primary    Encounter for weight loss counseling                  Plan:   - Ozempic 0.5 mg weekly.     - Log all food and beverage intake with a daily calorie goal of 1200 calories per day    - Moderate intensity aerobic exercise for 150 minutes per week

## 2023-01-26 ENCOUNTER — PATIENT MESSAGE (OUTPATIENT)
Dept: BARIATRICS | Facility: CLINIC | Age: 38
End: 2023-01-26
Payer: COMMERCIAL

## 2023-02-22 ENCOUNTER — PATIENT MESSAGE (OUTPATIENT)
Dept: BARIATRICS | Facility: CLINIC | Age: 38
End: 2023-02-22
Payer: COMMERCIAL

## 2023-03-15 ENCOUNTER — OFFICE VISIT (OUTPATIENT)
Dept: BARIATRICS | Facility: CLINIC | Age: 38
End: 2023-03-15
Payer: COMMERCIAL

## 2023-03-15 VITALS
HEART RATE: 82 BPM | SYSTOLIC BLOOD PRESSURE: 124 MMHG | WEIGHT: 194.38 LBS | DIASTOLIC BLOOD PRESSURE: 69 MMHG | BODY MASS INDEX: 36.73 KG/M2 | OXYGEN SATURATION: 98 %

## 2023-03-15 DIAGNOSIS — E66.01 CLASS 2 SEVERE OBESITY DUE TO EXCESS CALORIES WITH SERIOUS COMORBIDITY AND BODY MASS INDEX (BMI) OF 36.0 TO 36.9 IN ADULT: Primary | ICD-10-CM

## 2023-03-15 DIAGNOSIS — I10 HYPERTENSION, BENIGN: ICD-10-CM

## 2023-03-15 DIAGNOSIS — Z71.3 ENCOUNTER FOR WEIGHT LOSS COUNSELING: ICD-10-CM

## 2023-03-15 PROCEDURE — 99213 OFFICE O/P EST LOW 20 MIN: CPT | Mod: S$GLB,,, | Performed by: STUDENT IN AN ORGANIZED HEALTH CARE EDUCATION/TRAINING PROGRAM

## 2023-03-15 PROCEDURE — 99213 PR OFFICE/OUTPT VISIT, EST, LEVL III, 20-29 MIN: ICD-10-PCS | Mod: S$GLB,,, | Performed by: STUDENT IN AN ORGANIZED HEALTH CARE EDUCATION/TRAINING PROGRAM

## 2023-03-15 PROCEDURE — 99999 PR PBB SHADOW E&M-EST. PATIENT-LVL III: CPT | Mod: PBBFAC,,, | Performed by: STUDENT IN AN ORGANIZED HEALTH CARE EDUCATION/TRAINING PROGRAM

## 2023-03-15 PROCEDURE — 99999 PR PBB SHADOW E&M-EST. PATIENT-LVL III: ICD-10-PCS | Mod: PBBFAC,,, | Performed by: STUDENT IN AN ORGANIZED HEALTH CARE EDUCATION/TRAINING PROGRAM

## 2023-03-15 NOTE — PROGRESS NOTES
Subjective:       Patient ID: Meghna Aragon is a 37 y.o. female.    Chief Complaint: Follow-up, Obesity, and Weight Check      Patient presents for treatment of obesity.   Has tried WW, keto, portion control; loses weight but can't keep it off    Co-morbidities:   HTN  Thyroid Nodule - U/S 11/2021: Two small subcentimeter nodules which do not meet ACR TI rad criteria for fine-needle aspiration or follow-up.    Negative for thyroid cancer    Current Physical Activity  No regular exercise routine    Current Eating Habits  Breakfast - egg, antonio, and cheese sandwich; protein shake; on weekends, grits, egg, and antonio  Lunch - gumbo, grilled or fried chicken sandwiches; red beans and rice  Dinner - wings, brisket, pulled pork, salads, green beans, peas  Snacks - chips, cookies, ice cream for dessert  Beverages    Medical Weight Loss  10/18/2022: 210.9 lbs, BMI 36.8, BFP 41.2%, BFM 86.8 lbs, SMM 69.2 lbs, BMR 1586 kcal  1/6/2023: 201.8 lbs, BMI 35.2, BFP 40.4%, BFM 81.7 lbs, SMM 67 lbs, BMR 1548 kcal  3/15/2023: 194.4 lbs, BMI 33.9, BFP 39.6%, BFM 77.1 lbs, SMM 65.5 lbs, BMR 1520      Review of Systems   Constitutional:  Negative for chills and fever.   Respiratory:  Negative for shortness of breath.    Cardiovascular:  Negative for chest pain and palpitations.   Gastrointestinal:  Negative for abdominal pain, nausea and vomiting.   Neurological:  Negative for dizziness and light-headedness.   Psychiatric/Behavioral:  The patient is not nervous/anxious.        Objective:       Latest Reference Range & Units 10/23/21 08:50   WBC 3.90 - 12.70 K/uL 6.44   RBC 4.00 - 5.40 M/uL 4.36   Hemoglobin 12.0 - 16.0 g/dL 11.6 (L)   Hematocrit 37.0 - 48.5 % 37.1   MCV 82 - 98 fL 85   MCH 27.0 - 31.0 pg 26.6 (L)   MCHC 32.0 - 36.0 g/dL 31.3 (L)   RDW 11.5 - 14.5 % 13.8   Platelets 150 - 450 K/uL 278   MPV 9.2 - 12.9 fL 11.4   Gran % 38.0 - 73.0 % 64.1   Lymph % 18.0 - 48.0 % 26.6   Mono % 4.0 - 15.0 % 7.6   Eosinophil % 0.0 - 8.0  % 1.1   Basophil % 0.0 - 1.9 % 0.3   Immature Granulocytes 0.0 - 0.5 % 0.3   Gran # (ANC) 1.8 - 7.7 K/uL 4.1   Lymph # 1.0 - 4.8 K/uL 1.7   Mono # 0.3 - 1.0 K/uL 0.5   Eos # 0.0 - 0.5 K/uL 0.1   Baso # 0.00 - 0.20 K/uL 0.02   Immature Grans (Abs) 0.00 - 0.04 K/uL 0.02   nRBC 0 /100 WBC 0   Differential Method  Automated   Iron 30 - 160 ug/dL 37   TIBC 250 - 450 ug/dL 422   Saturated Iron 20 - 50 % 9 (L)   Transferrin 200 - 375 mg/dL 285   Ferritin 20.0 - 300.0 ng/mL 25   Sodium 136 - 145 mmol/L 139   Potassium 3.5 - 5.1 mmol/L 3.9   Chloride 95 - 110 mmol/L 107   CO2 23 - 29 mmol/L 26   Anion Gap 8 - 16 mmol/L 6 (L)   BUN 6 - 20 mg/dL 11   Creatinine 0.5 - 1.4 mg/dL 0.8   eGFR if non African American >60 mL/min/1.73 m^2 >60   eGFR if African American >60 mL/min/1.73 m^2 >60   Glucose 70 - 110 mg/dL 103   Calcium 8.7 - 10.5 mg/dL 8.9   Alkaline Phosphatase 55 - 135 U/L 71   PROTEIN TOTAL 6.0 - 8.4 g/dL 7.1   Albumin 3.5 - 5.2 g/dL 3.7   BILIRUBIN TOTAL 0.1 - 1.0 mg/dL 0.1   AST 10 - 40 U/L 12   ALT 10 - 44 U/L 13   Cholesterol 120 - 199 mg/dL 232 (H)   HDL 40 - 75 mg/dL 49   HDL/Cholesterol Ratio 20.0 - 50.0 % 21.1   LDL Cholesterol External 63.0 - 159.0 mg/dL 163.0 (H)   Non-HDL Cholesterol mg/dL 183   Total Cholesterol/HDL Ratio 2.0 - 5.0  4.7   Triglycerides 30 - 150 mg/dL 100   Hemoglobin A1C External 4.0 - 5.6 % 5.7 (H)   Estimated Avg Glucose 68 - 131 mg/dL 117   TSH 0.400 - 4.000 uIU/mL 0.907   (L): Data is abnormally low  (H): Data is abnormally high    Vitals:    03/15/23 1547   BP: 124/69   Pulse: 82         Physical Exam  Vitals reviewed.   Constitutional:       General: She is not in acute distress.     Appearance: Normal appearance. She is obese. She is not ill-appearing, toxic-appearing or diaphoretic.   HENT:      Head: Normocephalic and atraumatic.   Cardiovascular:      Rate and Rhythm: Normal rate.   Pulmonary:      Effort: Pulmonary effort is normal. No respiratory distress.   Skin:     General:  Skin is warm and dry.   Neurological:      Mental Status: She is alert and oriented to person, place, and time.      Gait: Gait normal.   Psychiatric:         Mood and Affect: Mood normal.         Behavior: Behavior normal.       Assessment:       Problem List Items Addressed This Visit       Hypertension, benign     Other Visit Diagnoses       Class 2 severe obesity due to excess calories with serious comorbidity and body mass index (BMI) of 36.0 to 36.9 in adult    -  Primary    Encounter for weight loss counseling                    Plan:   - Ozempic 1 mg weekly.     - Log all food and beverage intake with a daily calorie goal of 1200 calories per day    - Moderate intensity aerobic exercise for 150 minutes per week

## 2023-06-23 ENCOUNTER — OFFICE VISIT (OUTPATIENT)
Dept: BARIATRICS | Facility: CLINIC | Age: 38
End: 2023-06-23
Payer: COMMERCIAL

## 2023-06-23 VITALS
WEIGHT: 182.19 LBS | BODY MASS INDEX: 34.43 KG/M2 | OXYGEN SATURATION: 100 % | SYSTOLIC BLOOD PRESSURE: 134 MMHG | DIASTOLIC BLOOD PRESSURE: 83 MMHG | HEART RATE: 76 BPM

## 2023-06-23 DIAGNOSIS — Z71.3 ENCOUNTER FOR WEIGHT LOSS COUNSELING: ICD-10-CM

## 2023-06-23 DIAGNOSIS — I10 HYPERTENSION, BENIGN: ICD-10-CM

## 2023-06-23 DIAGNOSIS — E66.09 CLASS 1 OBESITY DUE TO EXCESS CALORIES WITH SERIOUS COMORBIDITY AND BODY MASS INDEX (BMI) OF 34.0 TO 34.9 IN ADULT: Primary | ICD-10-CM

## 2023-06-23 PROCEDURE — 99999 PR PBB SHADOW E&M-EST. PATIENT-LVL III: CPT | Mod: PBBFAC,,, | Performed by: STUDENT IN AN ORGANIZED HEALTH CARE EDUCATION/TRAINING PROGRAM

## 2023-06-23 PROCEDURE — 99999 PR PBB SHADOW E&M-EST. PATIENT-LVL III: ICD-10-PCS | Mod: PBBFAC,,, | Performed by: STUDENT IN AN ORGANIZED HEALTH CARE EDUCATION/TRAINING PROGRAM

## 2023-06-23 PROCEDURE — 99213 PR OFFICE/OUTPT VISIT, EST, LEVL III, 20-29 MIN: ICD-10-PCS | Mod: S$GLB,,, | Performed by: STUDENT IN AN ORGANIZED HEALTH CARE EDUCATION/TRAINING PROGRAM

## 2023-06-23 PROCEDURE — 99213 OFFICE O/P EST LOW 20 MIN: CPT | Mod: S$GLB,,, | Performed by: STUDENT IN AN ORGANIZED HEALTH CARE EDUCATION/TRAINING PROGRAM

## 2023-06-23 RX ORDER — SEMAGLUTIDE 0.68 MG/ML
0.5 INJECTION, SOLUTION SUBCUTANEOUS
Qty: 3 ML | Refills: 2 | Status: SHIPPED | OUTPATIENT
Start: 2023-06-23 | End: 2023-08-25

## 2023-06-23 NOTE — PROGRESS NOTES
Subjective:       Patient ID: Meghna Aragon is a 38 y.o. female.    Chief Complaint: Follow-up, Obesity, and Weight Check      Patient presents for treatment of obesity.   Has tried WW, keto, portion control; loses weight but can't keep it off    Co-morbidities:   HTN  Thyroid Nodule - U/S 11/2021: Two small subcentimeter nodules which do not meet ACR TI rad criteria for fine-needle aspiration or follow-up.    Negative for thyroid cancer    Current Physical Activity  No regular exercise routine    Current Eating Habits  Breakfast - egg, antonio, and cheese sandwich; protein shake; on weekends, grits, egg, and antonio  Lunch - gumbo, grilled or fried chicken sandwiches; red beans and rice  Dinner - wings, brisket, pulled pork, salads, green beans, peas  Snacks - chips, cookies, ice cream for dessert  Beverages    Episodes of nausea and vomiting this past month, possibly food related    Medical Weight Loss  10/18/2022: 210.9 lbs, BMI 36.8, BFP 41.2%, BFM 86.8 lbs, SMM 69.2 lbs, BMR 1586 kcal  1/6/2023: 201.8 lbs, BMI 35.2, BFP 40.4%, BFM 81.7 lbs, SMM 67 lbs, BMR 1548 kcal  3/15/2023: 194.4 lbs, BMI 33.9, BFP 39.6%, BFM 77.1 lbs, SMM 65.5 lbs, BMR 1520  6/23/2023: 182.2 lbs, BMI 31.8, BFP 37.1%, BFM 67.6 lbs, SMM 64.6 lbs, BMR 1494 kcal    Review of Systems   Constitutional:  Negative for chills and fever.   Respiratory:  Negative for shortness of breath.    Cardiovascular:  Negative for chest pain and palpitations.   Gastrointestinal:  Negative for abdominal pain, nausea and vomiting.   Neurological:  Negative for dizziness and light-headedness.   Psychiatric/Behavioral:  The patient is not nervous/anxious.        Objective:       Latest Reference Range & Units 10/23/21 08:50   WBC 3.90 - 12.70 K/uL 6.44   RBC 4.00 - 5.40 M/uL 4.36   Hemoglobin 12.0 - 16.0 g/dL 11.6 (L)   Hematocrit 37.0 - 48.5 % 37.1   MCV 82 - 98 fL 85   MCH 27.0 - 31.0 pg 26.6 (L)   MCHC 32.0 - 36.0 g/dL 31.3 (L)   RDW 11.5 - 14.5 % 13.8    Platelets 150 - 450 K/uL 278   MPV 9.2 - 12.9 fL 11.4   Gran % 38.0 - 73.0 % 64.1   Lymph % 18.0 - 48.0 % 26.6   Mono % 4.0 - 15.0 % 7.6   Eosinophil % 0.0 - 8.0 % 1.1   Basophil % 0.0 - 1.9 % 0.3   Immature Granulocytes 0.0 - 0.5 % 0.3   Gran # (ANC) 1.8 - 7.7 K/uL 4.1   Lymph # 1.0 - 4.8 K/uL 1.7   Mono # 0.3 - 1.0 K/uL 0.5   Eos # 0.0 - 0.5 K/uL 0.1   Baso # 0.00 - 0.20 K/uL 0.02   Immature Grans (Abs) 0.00 - 0.04 K/uL 0.02   nRBC 0 /100 WBC 0   Differential Method  Automated   Iron 30 - 160 ug/dL 37   TIBC 250 - 450 ug/dL 422   Saturated Iron 20 - 50 % 9 (L)   Transferrin 200 - 375 mg/dL 285   Ferritin 20.0 - 300.0 ng/mL 25   Sodium 136 - 145 mmol/L 139   Potassium 3.5 - 5.1 mmol/L 3.9   Chloride 95 - 110 mmol/L 107   CO2 23 - 29 mmol/L 26   Anion Gap 8 - 16 mmol/L 6 (L)   BUN 6 - 20 mg/dL 11   Creatinine 0.5 - 1.4 mg/dL 0.8   eGFR if non African American >60 mL/min/1.73 m^2 >60   eGFR if African American >60 mL/min/1.73 m^2 >60   Glucose 70 - 110 mg/dL 103   Calcium 8.7 - 10.5 mg/dL 8.9   Alkaline Phosphatase 55 - 135 U/L 71   PROTEIN TOTAL 6.0 - 8.4 g/dL 7.1   Albumin 3.5 - 5.2 g/dL 3.7   BILIRUBIN TOTAL 0.1 - 1.0 mg/dL 0.1   AST 10 - 40 U/L 12   ALT 10 - 44 U/L 13   Cholesterol 120 - 199 mg/dL 232 (H)   HDL 40 - 75 mg/dL 49   HDL/Cholesterol Ratio 20.0 - 50.0 % 21.1   LDL Cholesterol External 63.0 - 159.0 mg/dL 163.0 (H)   Non-HDL Cholesterol mg/dL 183   Total Cholesterol/HDL Ratio 2.0 - 5.0  4.7   Triglycerides 30 - 150 mg/dL 100   Hemoglobin A1C External 4.0 - 5.6 % 5.7 (H)   Estimated Avg Glucose 68 - 131 mg/dL 117   TSH 0.400 - 4.000 uIU/mL 0.907   (L): Data is abnormally low  (H): Data is abnormally high    Vitals:    06/23/23 0933   BP: 134/83   Pulse: 76         Physical Exam  Vitals reviewed.   Constitutional:       General: She is not in acute distress.     Appearance: Normal appearance. She is obese. She is not ill-appearing, toxic-appearing or diaphoretic.   HENT:      Head: Normocephalic and  atraumatic.   Cardiovascular:      Rate and Rhythm: Normal rate.   Pulmonary:      Effort: Pulmonary effort is normal. No respiratory distress.   Skin:     General: Skin is warm and dry.   Neurological:      Mental Status: She is alert and oriented to person, place, and time.      Gait: Gait normal.   Psychiatric:         Mood and Affect: Mood normal.         Behavior: Behavior normal.       Assessment:       Problem List Items Addressed This Visit       Hypertension, benign - Primary           Plan:   - Episodes of nausea and vomiting this past month, possibly food related. Will hold Ozempic for 2 weeks, then resume.    - Log all food and beverage intake with a daily calorie goal of 1200 calories per day    - Moderate intensity aerobic exercise for 150 minutes per week

## 2023-07-13 ENCOUNTER — PATIENT MESSAGE (OUTPATIENT)
Dept: OBSTETRICS AND GYNECOLOGY | Facility: CLINIC | Age: 38
End: 2023-07-13
Payer: COMMERCIAL

## 2023-08-02 ENCOUNTER — PATIENT MESSAGE (OUTPATIENT)
Dept: BARIATRICS | Facility: CLINIC | Age: 38
End: 2023-08-02
Payer: COMMERCIAL

## 2023-08-25 ENCOUNTER — OFFICE VISIT (OUTPATIENT)
Dept: OBSTETRICS AND GYNECOLOGY | Facility: CLINIC | Age: 38
End: 2023-08-25
Payer: COMMERCIAL

## 2023-08-25 ENCOUNTER — OFFICE VISIT (OUTPATIENT)
Dept: INTERNAL MEDICINE | Facility: CLINIC | Age: 38
End: 2023-08-25
Attending: INTERNAL MEDICINE
Payer: COMMERCIAL

## 2023-08-25 VITALS
DIASTOLIC BLOOD PRESSURE: 84 MMHG | BODY MASS INDEX: 33.36 KG/M2 | WEIGHT: 188.25 LBS | SYSTOLIC BLOOD PRESSURE: 124 MMHG | HEIGHT: 63 IN

## 2023-08-25 VITALS
HEIGHT: 63 IN | DIASTOLIC BLOOD PRESSURE: 72 MMHG | OXYGEN SATURATION: 98 % | WEIGHT: 189.38 LBS | SYSTOLIC BLOOD PRESSURE: 112 MMHG | BODY MASS INDEX: 33.55 KG/M2 | HEART RATE: 80 BPM

## 2023-08-25 DIAGNOSIS — E66.9 OBESITY (BMI 30.0-34.9): ICD-10-CM

## 2023-08-25 DIAGNOSIS — Z00.00 ANNUAL PHYSICAL EXAM: Primary | ICD-10-CM

## 2023-08-25 DIAGNOSIS — Z12.4 ENCOUNTER FOR PAPANICOLAOU SMEAR FOR CERVICAL CANCER SCREENING: ICD-10-CM

## 2023-08-25 DIAGNOSIS — Z11.51 SCREENING FOR HPV (HUMAN PAPILLOMAVIRUS): ICD-10-CM

## 2023-08-25 DIAGNOSIS — E22.1 HYPERPROLACTINEMIA: ICD-10-CM

## 2023-08-25 DIAGNOSIS — I10 HYPERTENSION, BENIGN: ICD-10-CM

## 2023-08-25 DIAGNOSIS — Z01.419 WOMEN'S ANNUAL ROUTINE GYNECOLOGICAL EXAMINATION: Primary | ICD-10-CM

## 2023-08-25 DIAGNOSIS — D50.9 IRON DEFICIENCY ANEMIA, UNSPECIFIED IRON DEFICIENCY ANEMIA TYPE: ICD-10-CM

## 2023-08-25 PROBLEM — E66.811 OBESITY (BMI 30.0-34.9): Status: ACTIVE | Noted: 2017-05-19

## 2023-08-25 PROBLEM — E66.01 SEVERE OBESITY (BMI >= 40): Status: RESOLVED | Noted: 2021-09-26 | Resolved: 2023-08-25

## 2023-08-25 PROCEDURE — 99999 PR PBB SHADOW E&M-EST. PATIENT-LVL III: ICD-10-PCS | Mod: PBBFAC,,, | Performed by: NURSE PRACTITIONER

## 2023-08-25 PROCEDURE — 99999 PR PBB SHADOW E&M-EST. PATIENT-LVL III: CPT | Mod: PBBFAC,,, | Performed by: INTERNAL MEDICINE

## 2023-08-25 PROCEDURE — 99395 PREV VISIT EST AGE 18-39: CPT | Mod: S$GLB,,, | Performed by: NURSE PRACTITIONER

## 2023-08-25 PROCEDURE — 99395 PR PREVENTIVE VISIT,EST,18-39: ICD-10-PCS | Mod: S$GLB,,, | Performed by: INTERNAL MEDICINE

## 2023-08-25 PROCEDURE — 99395 PREV VISIT EST AGE 18-39: CPT | Mod: S$GLB,,, | Performed by: INTERNAL MEDICINE

## 2023-08-25 PROCEDURE — 99999 PR PBB SHADOW E&M-EST. PATIENT-LVL III: ICD-10-PCS | Mod: PBBFAC,,, | Performed by: INTERNAL MEDICINE

## 2023-08-25 PROCEDURE — 88175 CYTOPATH C/V AUTO FLUID REDO: CPT | Performed by: NURSE PRACTITIONER

## 2023-08-25 PROCEDURE — 99999 PR PBB SHADOW E&M-EST. PATIENT-LVL III: CPT | Mod: PBBFAC,,, | Performed by: NURSE PRACTITIONER

## 2023-08-25 PROCEDURE — 99395 PR PREVENTIVE VISIT,EST,18-39: ICD-10-PCS | Mod: S$GLB,,, | Performed by: NURSE PRACTITIONER

## 2023-08-25 PROCEDURE — 87624 HPV HI-RISK TYP POOLED RSLT: CPT | Performed by: NURSE PRACTITIONER

## 2023-08-25 RX ORDER — SEMAGLUTIDE 1.34 MG/ML
INJECTION, SOLUTION SUBCUTANEOUS
COMMUNITY
Start: 2023-05-15 | End: 2023-09-08

## 2023-08-25 NOTE — PROGRESS NOTES
CC: Annual  HPI: Pt is a 38 y.o.  female who presents for routine annual exam. She is not using contraception. She does not want STD screening.  Denies any GYN complaints.  Cycles have been regular off [employer]. The patient participates in regular exercise: needs more.  The patient does not smoke.     Pt denies any domestic violence.       ROS:  GENERAL: Feeling well overall. Denies fever or chills.   SKIN: Denies rash or lesions.   HEAD: Denies head injury or headache.   NODES: Denies enlarged lymph nodes.   CHEST: Denies chest pain or shortness of breath.   CARDIOVASCULAR: Denies palpitations or left sided chest pain.   ABDOMEN: No abdominal pain, constipation, diarrhea, nausea, vomiting or rectal bleeding.   URINARY: No dysuria, hematuria, or burning on urination.  REPRODUCTIVE: See HPI.   BREASTS: Denies pain, lumps, or nipple discharge.   HEMATOLOGIC: No easy bruisability or excessive bleeding.   MUSCULOSKELETAL: Denies joint pain or swelling.   NEUROLOGIC: Denies syncope or weakness.   PSYCHIATRIC: Denies depression, anxiety or mood swings.    PE:   APPEARANCE: Well nourished, well developed, Black or  female in no acute distress.  NODES: no cervical, supraclavicular, or inguinal lymphadenopathy  BREASTS: Symmetrical, no skin changes or visible lesions. No palpable masses, nipple discharge or adenopathy bilaterally.  ABDOMEN: Soft. No tenderness or masses. No distention. No hernias palpated. No CVA tenderness.  VULVA: No lesions. Normal external female genitalia.  URETHRAL MEATUS: Normal size and location, no lesions, no prolapse.  URETHRA: No masses, tenderness, or prolapse.  VAGINA: Moist. No lesions or lacerations noted. No abnormal discharge present. No odor present.   CERVIX: No lesions or discharge. No cervical motion tenderness.   UTERUS: Normal size, regular shape, mobile, non-tender.  ADNEXA: No tenderness. No fullness or masses palpated in the adnexal regions.   ANUS PERINEUM:  Normal.      Diagnosis:  1. Women's annual routine gynecological examination    2. Encounter for Papanicolaou smear for cervical cancer screening    3. Screening for HPV (human papillomavirus)        Plan:   Pap/ HPV  MMG at 40   Keep endocrine follow for hyperprolactinemia   Orders Placed This Encounter    HPV High Risk Genotypes, PCR    Liquid-Based Pap Smear, Screening       Patient was counseled today on the new ACS guidelines for cervical cytology screening as well as the current recommendations for breast cancer screening. She was counseled to follow up with her PCP for other routine health maintenance. Counseling session lasted approximately 10 minutes, and all her questions were answered.    Follow-up with me in 1 year for routine exam    Kim Gonzalez, TSERING-C

## 2023-08-25 NOTE — PROGRESS NOTES
"Subjective:   Patient ID: Meghna Aragon is a 38 y.o. female  Chief complaint:   Chief Complaint   Patient presents with    Annual Exam       HPI  Pt here for annual exam     Lives in [facility] - house now fixed from [place]  Kids: now 4 years and 7 years ago      HTN:   - taking procardia 30mg daily - not checking at home - bp well controlled at this time with weight loss   Previously:  Pregnancy complicated by pre eclampsia - started on labetalol at that time   - not checking bp at home but still has bp cuff from digital MOM program     Obesity:   Weight 187.6 on home scale   - lost weight since 2021 (215 pounds)  - on ozempic again but now issues with insurance - waiting on [place]  - had some diarrhea in June - held ozempic - sx resolved and then resumed med and new so far   - to cont 0.5 for now and will revisit 1mg dose in future    HLD:   started on cholesterol medication in past after seen by cards and stopped taking it   - restarted and sx of back pain returned and then stopped   - started red yeast but taking inconsistently    Thyroid nodule:   - had thyroid ultrasound 5/2018 and 11/2021:- stable   - Seen by endo 8/2018 and repeat us ordered and overdue - was due 18-24 months from prior 5/2018    Hyperprolactinemia: followed by endo:  - prolactin level dec with tx - stopped taking medication after skipping doses   - seen prior to pregnancy   - no longer breastfeeding  - no galactorrhea     NYA:   Having monthly periods   Not taking oral iron at this time    HM:  Flu, covid     Review of Systems    Objective:  Vitals:    08/25/23 0930   BP: 112/72   BP Location: Right arm   Patient Position: Sitting   Pulse: 80   SpO2: 98%   Weight: 85.9 kg (189 lb 6 oz)   Height: 5' 3" (1.6 m)     Body mass index is 33.55 kg/m².    Physical Exam  Vitals reviewed.   Constitutional:       Appearance: Normal appearance. She is well-developed.   HENT:      Head: Normocephalic and atraumatic.      Right Ear: Tympanic " membrane, ear canal and external ear normal.      Left Ear: Tympanic membrane, ear canal and external ear normal.      Nose: Nose normal. No congestion.      Mouth/Throat:      Mouth: Mucous membranes are moist.      Pharynx: Oropharynx is clear. No oropharyngeal exudate.   Eyes:      Extraocular Movements: Extraocular movements intact.      Conjunctiva/sclera: Conjunctivae normal.   Neck:      Thyroid: No thyromegaly.   Cardiovascular:      Rate and Rhythm: Normal rate and regular rhythm.      Pulses: Normal pulses.      Heart sounds: Normal heart sounds.   Pulmonary:      Effort: Pulmonary effort is normal.      Breath sounds: Normal breath sounds.   Abdominal:      General: Bowel sounds are normal.      Palpations: Abdomen is soft.   Musculoskeletal:         General: No swelling or tenderness.      Cervical back: Neck supple.   Lymphadenopathy:      Cervical: No cervical adenopathy.   Skin:     General: Skin is warm and dry.      Capillary Refill: Capillary refill takes less than 2 seconds.   Neurological:      General: No focal deficit present.      Mental Status: She is alert and oriented to person, place, and time. Mental status is at baseline.   Psychiatric:         Behavior: Behavior normal.         Thought Content: Thought content normal.       Assessment:  1. Annual physical exam    2. Hyperprolactinemia    3. Iron deficiency anemia, unspecified iron deficiency anemia type    4. Obesity (BMI 30.0-34.9)    5. Hypertension, benign          Plan:  Meghna was seen today for annual exam.    Diagnoses and all orders for this visit:    Annual physical exam  -     Hemoglobin A1C; Future  -     TSH; Future  -     Lipid Panel; Future  -     CBC Auto Differential; Future  -     Comprehensive Metabolic Panel; Future    Hyperprolactinemia  -     PROLACTIN; Future    Iron deficiency anemia, unspecified iron deficiency anemia type  -     FERRITIN; Future  -     IRON AND TIBC; Future    Obesity (BMI 30.0-34.9)  - cont  diet and exercise  - increase intensity and duration of CV exercise to continue weight loss  - goal wt loss one pound per week  - portion control, healthy choices    cont meds  Recommend daily sunscreen, cardiovascular exercise min 30 min 5 days per week. Seatbelts routinely.  Resume dig htn program and send in more readings   Low salt diet   Graded exercise program     Hypertension:   Stop ccb - monitor home bp readings - nv in 2 weeks for bp check   If at goal then cont off   Low salt diet   Graded exercise program     Health Maintenance   Topic Date Due    TETANUS VACCINE  07/26/2029    Hepatitis C Screening  Completed    Lipid Panel  Completed

## 2023-08-30 ENCOUNTER — LAB VISIT (OUTPATIENT)
Dept: LAB | Facility: HOSPITAL | Age: 38
End: 2023-08-30
Payer: COMMERCIAL

## 2023-08-30 DIAGNOSIS — D50.9 IRON DEFICIENCY ANEMIA, UNSPECIFIED IRON DEFICIENCY ANEMIA TYPE: ICD-10-CM

## 2023-08-30 DIAGNOSIS — E22.1 HYPERPROLACTINEMIA: ICD-10-CM

## 2023-08-30 DIAGNOSIS — Z00.00 ANNUAL PHYSICAL EXAM: ICD-10-CM

## 2023-08-30 LAB
ALBUMIN SERPL BCP-MCNC: 3.4 G/DL (ref 3.5–5.2)
ALP SERPL-CCNC: 51 U/L (ref 55–135)
ALT SERPL W/O P-5'-P-CCNC: 12 U/L (ref 10–44)
ANION GAP SERPL CALC-SCNC: 10 MMOL/L (ref 8–16)
AST SERPL-CCNC: 12 U/L (ref 10–40)
BASOPHILS # BLD AUTO: 0.03 K/UL (ref 0–0.2)
BASOPHILS NFR BLD: 0.5 % (ref 0–1.9)
BILIRUB SERPL-MCNC: 0.2 MG/DL (ref 0.1–1)
BUN SERPL-MCNC: 11 MG/DL (ref 6–20)
CALCIUM SERPL-MCNC: 9.1 MG/DL (ref 8.7–10.5)
CHLORIDE SERPL-SCNC: 106 MMOL/L (ref 95–110)
CHOLEST SERPL-MCNC: 195 MG/DL (ref 120–199)
CHOLEST/HDLC SERPL: 4 {RATIO} (ref 2–5)
CO2 SERPL-SCNC: 23 MMOL/L (ref 23–29)
CREAT SERPL-MCNC: 0.8 MG/DL (ref 0.5–1.4)
DIFFERENTIAL METHOD: ABNORMAL
EOSINOPHIL # BLD AUTO: 0.1 K/UL (ref 0–0.5)
EOSINOPHIL NFR BLD: 1.5 % (ref 0–8)
ERYTHROCYTE [DISTWIDTH] IN BLOOD BY AUTOMATED COUNT: 14.6 % (ref 11.5–14.5)
EST. GFR  (NO RACE VARIABLE): >60 ML/MIN/1.73 M^2
ESTIMATED AVG GLUCOSE: 111 MG/DL (ref 68–131)
FERRITIN SERPL-MCNC: 17 NG/ML (ref 20–300)
GLUCOSE SERPL-MCNC: 96 MG/DL (ref 70–110)
HBA1C MFR BLD: 5.5 % (ref 4–5.6)
HCT VFR BLD AUTO: 35.4 % (ref 37–48.5)
HDLC SERPL-MCNC: 49 MG/DL (ref 40–75)
HDLC SERPL: 25.1 % (ref 20–50)
HGB BLD-MCNC: 11.1 G/DL (ref 12–16)
IMM GRANULOCYTES # BLD AUTO: 0.01 K/UL (ref 0–0.04)
IMM GRANULOCYTES NFR BLD AUTO: 0.2 % (ref 0–0.5)
IRON SERPL-MCNC: 98 UG/DL (ref 30–160)
LDLC SERPL CALC-MCNC: 112 MG/DL (ref 63–159)
LYMPHOCYTES # BLD AUTO: 1.9 K/UL (ref 1–4.8)
LYMPHOCYTES NFR BLD: 30.5 % (ref 18–48)
MCH RBC QN AUTO: 26.3 PG (ref 27–31)
MCHC RBC AUTO-ENTMCNC: 31.4 G/DL (ref 32–36)
MCV RBC AUTO: 84 FL (ref 82–98)
MONOCYTES # BLD AUTO: 0.5 K/UL (ref 0.3–1)
MONOCYTES NFR BLD: 7.3 % (ref 4–15)
NEUTROPHILS # BLD AUTO: 3.7 K/UL (ref 1.8–7.7)
NEUTROPHILS NFR BLD: 60 % (ref 38–73)
NONHDLC SERPL-MCNC: 146 MG/DL
NRBC BLD-RTO: 0 /100 WBC
PLATELET # BLD AUTO: 288 K/UL (ref 150–450)
PMV BLD AUTO: 12.1 FL (ref 9.2–12.9)
POTASSIUM SERPL-SCNC: 3.5 MMOL/L (ref 3.5–5.1)
PROLACTIN SERPL IA-MCNC: 49.9 NG/ML (ref 5.2–26.5)
PROT SERPL-MCNC: 6.5 G/DL (ref 6–8.4)
RBC # BLD AUTO: 4.22 M/UL (ref 4–5.4)
SATURATED IRON: 25 % (ref 20–50)
SODIUM SERPL-SCNC: 139 MMOL/L (ref 136–145)
TOTAL IRON BINDING CAPACITY: 386 UG/DL (ref 250–450)
TRANSFERRIN SERPL-MCNC: 261 MG/DL (ref 200–375)
TRIGL SERPL-MCNC: 170 MG/DL (ref 30–150)
TSH SERPL DL<=0.005 MIU/L-ACNC: 2.78 UIU/ML (ref 0.4–4)
WBC # BLD AUTO: 6.13 K/UL (ref 3.9–12.7)

## 2023-08-30 PROCEDURE — 83036 HEMOGLOBIN GLYCOSYLATED A1C: CPT | Performed by: INTERNAL MEDICINE

## 2023-08-30 PROCEDURE — 83540 ASSAY OF IRON: CPT | Performed by: INTERNAL MEDICINE

## 2023-08-30 PROCEDURE — 84443 ASSAY THYROID STIM HORMONE: CPT | Performed by: INTERNAL MEDICINE

## 2023-08-30 PROCEDURE — 80061 LIPID PANEL: CPT | Performed by: INTERNAL MEDICINE

## 2023-08-30 PROCEDURE — 36415 COLL VENOUS BLD VENIPUNCTURE: CPT | Performed by: INTERNAL MEDICINE

## 2023-08-30 PROCEDURE — 85025 COMPLETE CBC W/AUTO DIFF WBC: CPT | Performed by: INTERNAL MEDICINE

## 2023-08-30 PROCEDURE — 84146 ASSAY OF PROLACTIN: CPT | Performed by: INTERNAL MEDICINE

## 2023-08-30 PROCEDURE — 84466 ASSAY OF TRANSFERRIN: CPT | Performed by: INTERNAL MEDICINE

## 2023-08-30 PROCEDURE — 82728 ASSAY OF FERRITIN: CPT | Performed by: INTERNAL MEDICINE

## 2023-08-30 PROCEDURE — 80053 COMPREHEN METABOLIC PANEL: CPT | Performed by: INTERNAL MEDICINE

## 2023-08-31 LAB
HPV HR 12 DNA SPEC QL NAA+PROBE: NEGATIVE
HPV16 AG SPEC QL: NEGATIVE
HPV18 DNA SPEC QL NAA+PROBE: NEGATIVE

## 2023-09-01 LAB
FINAL PATHOLOGIC DIAGNOSIS: NORMAL
Lab: NORMAL

## 2023-09-06 ENCOUNTER — PATIENT MESSAGE (OUTPATIENT)
Dept: BARIATRICS | Facility: CLINIC | Age: 38
End: 2023-09-06
Payer: COMMERCIAL

## 2023-09-06 ENCOUNTER — PATIENT MESSAGE (OUTPATIENT)
Dept: INTERNAL MEDICINE | Facility: CLINIC | Age: 38
End: 2023-09-06
Payer: COMMERCIAL

## 2023-09-06 DIAGNOSIS — E66.09 CLASS 1 OBESITY DUE TO EXCESS CALORIES WITH SERIOUS COMORBIDITY AND BODY MASS INDEX (BMI) OF 34.0 TO 34.9 IN ADULT: Primary | ICD-10-CM

## 2023-09-08 RX ORDER — SEMAGLUTIDE 0.5 MG/.5ML
0.5 INJECTION, SOLUTION SUBCUTANEOUS
Qty: 2 ML | Refills: 0 | Status: SHIPPED | OUTPATIENT
Start: 2023-10-06 | End: 2023-10-28

## 2023-09-08 RX ORDER — SEMAGLUTIDE 0.25 MG/.5ML
0.25 INJECTION, SOLUTION SUBCUTANEOUS
Qty: 2 ML | Refills: 0 | Status: SHIPPED | OUTPATIENT
Start: 2023-09-08 | End: 2023-09-30

## 2023-09-14 RX ORDER — METFORMIN HYDROCHLORIDE 500 MG/1
500 TABLET, EXTENDED RELEASE ORAL
Qty: 90 TABLET | Refills: 0 | Status: SHIPPED | OUTPATIENT
Start: 2023-09-14 | End: 2023-11-28

## 2023-10-02 DIAGNOSIS — I10 HYPERTENSION, BENIGN: ICD-10-CM

## 2023-10-03 RX ORDER — NIFEDIPINE 30 MG/1
30 TABLET, EXTENDED RELEASE ORAL DAILY
Qty: 90 TABLET | Refills: 3 | Status: SHIPPED | OUTPATIENT
Start: 2023-10-03 | End: 2024-09-27

## 2023-10-03 NOTE — TELEPHONE ENCOUNTER
Refill Decision Note   Meghna Margo  is requesting a refill authorization.  Brief Assessment and Rationale for Refill:  Approve     Medication Therapy Plan:         Comments:     Note composed:7:46 AM 10/03/2023

## 2023-10-03 NOTE — TELEPHONE ENCOUNTER
No care due was identified.  Health [facility] Embedded Care Due Messages. Reference number: [id number].   10/02/2023 10:44:38 PM CDT

## 2023-11-28 ENCOUNTER — OFFICE VISIT (OUTPATIENT)
Dept: BARIATRICS | Facility: CLINIC | Age: 38
End: 2023-11-28
Payer: COMMERCIAL

## 2023-11-28 VITALS
HEART RATE: 86 BPM | BODY MASS INDEX: 35.09 KG/M2 | OXYGEN SATURATION: 98 % | DIASTOLIC BLOOD PRESSURE: 80 MMHG | WEIGHT: 198.13 LBS | SYSTOLIC BLOOD PRESSURE: 134 MMHG

## 2023-11-28 DIAGNOSIS — I10 HYPERTENSION, BENIGN: ICD-10-CM

## 2023-11-28 DIAGNOSIS — Z71.3 ENCOUNTER FOR WEIGHT LOSS COUNSELING: ICD-10-CM

## 2023-11-28 DIAGNOSIS — E66.01 CLASS 2 SEVERE OBESITY DUE TO EXCESS CALORIES WITH SERIOUS COMORBIDITY AND BODY MASS INDEX (BMI) OF 35.0 TO 35.9 IN ADULT: Primary | ICD-10-CM

## 2023-11-28 PROCEDURE — 99999 PR PBB SHADOW E&M-EST. PATIENT-LVL III: ICD-10-PCS | Mod: PBBFAC,,, | Performed by: STUDENT IN AN ORGANIZED HEALTH CARE EDUCATION/TRAINING PROGRAM

## 2023-11-28 PROCEDURE — 99999 PR PBB SHADOW E&M-EST. PATIENT-LVL III: CPT | Mod: PBBFAC,,, | Performed by: STUDENT IN AN ORGANIZED HEALTH CARE EDUCATION/TRAINING PROGRAM

## 2023-11-28 PROCEDURE — 99213 OFFICE O/P EST LOW 20 MIN: CPT | Mod: S$GLB,,, | Performed by: STUDENT IN AN ORGANIZED HEALTH CARE EDUCATION/TRAINING PROGRAM

## 2023-11-28 PROCEDURE — 99213 PR OFFICE/OUTPT VISIT, EST, LEVL III, 20-29 MIN: ICD-10-PCS | Mod: S$GLB,,, | Performed by: STUDENT IN AN ORGANIZED HEALTH CARE EDUCATION/TRAINING PROGRAM

## 2023-11-28 NOTE — PROGRESS NOTES
Subjective:       Patient ID: Meghna Aragon is a 38 y.o. female.    Chief Complaint: Follow-up, Obesity, and Weight Check      Patient presents for treatment of obesity.   Has tried WW, keto, portion control; loses weight but can't keep it off    Co-morbidities:   HTN  Thyroid Nodule - U/S 11/2021: Two small subcentimeter nodules which do not meet ACR TI rad criteria for fine-needle aspiration or follow-up.    Negative for thyroid cancer    Current Physical Activity  No regular exercise routine    Current Eating Habits  Breakfast - egg, antonio, and cheese sandwich; protein shake; on weekends, grits, egg, and antonio  Lunch - gumbo, grilled or fried chicken sandwiches; red beans and rice  Dinner - wings, brisket, pulled pork, salads, green beans, peas  Snacks - chips, cookies, ice cream for dessert  Beverages    Tuna salad and chicken salad made with bradley  Protein shakes  Sandwiches    Medical Weight Loss  10/18/2022: 210.9 lbs, BMI 36.8, BFP 41.2%, BFM 86.8 lbs, SMM 69.2 lbs, BMR 1586 kcal  1/6/2023: 201.8 lbs, BMI 35.2, BFP 40.4%, BFM 81.7 lbs, SMM 67 lbs, BMR 1548 kcal  3/15/2023: 194.4 lbs, BMI 33.9, BFP 39.6%, BFM 77.1 lbs, SMM 65.5 lbs, BMR 1520  6/23/2023: 182.2 lbs, BMI 31.8, BFP 37.1%, BFM 67.6 lbs, SMM 64.6 lbs, BMR 1494 kcal  11/28/2023: 198.1 lbs, BMI 34.5, BFP 39.1%, BFM 77.5 lbs, SMM 66.6 lbs, BMR 1552 kcal    Metformin caused upset stomach and pain      Review of Systems   Constitutional:  Negative for chills and fever.   Respiratory:  Negative for shortness of breath.    Cardiovascular:  Negative for chest pain and palpitations.   Gastrointestinal:  Negative for abdominal pain, nausea and vomiting.   Neurological:  Negative for dizziness and light-headedness.   Psychiatric/Behavioral:  The patient is not nervous/anxious.          Objective:       Latest Reference Range & Units 08/30/23 07:19   WBC 3.90 - 12.70 K/uL 6.13   RBC 4.00 - 5.40 M/uL 4.22   Hemoglobin 12.0 - 16.0 g/dL 11.1 (L)    Hematocrit 37.0 - 48.5 % 35.4 (L)   MCV 82 - 98 fL 84   MCH 27.0 - 31.0 pg 26.3 (L)   MCHC 32.0 - 36.0 g/dL 31.4 (L)   RDW 11.5 - 14.5 % 14.6 (H)   Platelet Count 150 - 450 K/uL 288   MPV 9.2 - 12.9 fL 12.1   Gran % 38.0 - 73.0 % 60.0   Lymph % 18.0 - 48.0 % 30.5   Mono % 4.0 - 15.0 % 7.3   Eosinophil % 0.0 - 8.0 % 1.5   Basophil % 0.0 - 1.9 % 0.5   Immature Granulocytes 0.0 - 0.5 % 0.2   Gran # (ANC) 1.8 - 7.7 K/uL 3.7   Lymph # 1.0 - 4.8 K/uL 1.9   Mono # 0.3 - 1.0 K/uL 0.5   Eos # 0.0 - 0.5 K/uL 0.1   Baso # 0.00 - 0.20 K/uL 0.03   Immature Grans (Abs) 0.00 - 0.04 K/uL 0.01   nRBC 0 /100 WBC 0   Differential Method  Automated   Iron 30 - 160 ug/dL 98   TIBC 250 - 450 ug/dL 386   Saturated Iron 20 - 50 % 25   Transferrin 200 - 375 mg/dL 261   Ferritin 20.0 - 300.0 ng/mL 17 (L)   Sodium 136 - 145 mmol/L 139   Potassium 3.5 - 5.1 mmol/L 3.5   Chloride 95 - 110 mmol/L 106   CO2 23 - 29 mmol/L 23   Anion Gap 8 - 16 mmol/L 10   BUN 6 - 20 mg/dL 11   Creatinine 0.5 - 1.4 mg/dL 0.8   eGFR >60 mL/min/1.73 m^2 >60   Glucose 70 - 110 mg/dL 96   Calcium 8.7 - 10.5 mg/dL 9.1   ALP 55 - 135 U/L 51 (L)   PROTEIN TOTAL 6.0 - 8.4 g/dL 6.5   Albumin 3.5 - 5.2 g/dL 3.4 (L)   BILIRUBIN TOTAL 0.1 - 1.0 mg/dL 0.2   AST 10 - 40 U/L 12   ALT 10 - 44 U/L 12   Cholesterol Total 120 - 199 mg/dL 195   HDL 40 - 75 mg/dL 49   HDL/Cholesterol Ratio 20.0 - 50.0 % 25.1   Non-HDL Cholesterol mg/dL 146   Total Cholesterol/HDL Ratio 2.0 - 5.0  4.0   Triglycerides 30 - 150 mg/dL 170 (H)   LDL Cholesterol 63.0 - 159.0 mg/dL 112.0   Hemoglobin A1C External 4.0 - 5.6 % 5.5   Estimated Avg Glucose 68 - 131 mg/dL 111   TSH 0.400 - 4.000 uIU/mL 2.776   Prolactin 5.2 - 26.5 ng/mL 49.9 (H)   (L): Data is abnormally low  (H): Data is abnormally high      Vitals:    11/28/23 1554   BP: 134/80   Pulse: 86         Physical Exam  Vitals reviewed.   Constitutional:       General: She is not in acute distress.     Appearance: Normal appearance. She is obese. She  is not ill-appearing, toxic-appearing or diaphoretic.   HENT:      Head: Normocephalic and atraumatic.   Cardiovascular:      Rate and Rhythm: Normal rate.   Pulmonary:      Effort: Pulmonary effort is normal. No respiratory distress.   Skin:     General: Skin is warm and dry.   Neurological:      Mental Status: She is alert and oriented to person, place, and time.         Assessment:       Problem List Items Addressed This Visit       Hypertension, benign     Other Visit Diagnoses       Class 2 severe obesity due to excess calories with serious comorbidity and body mass index (BMI) of 35.0 to 35.9 in adult    -  Primary    Encounter for weight loss counseling                  Plan:     - Log all food and beverage intake with a daily calorie goal of 1200 calories per day    - Moderate intensity aerobic exercise for 150 minutes per week

## 2023-12-18 ENCOUNTER — OFFICE VISIT (OUTPATIENT)
Dept: ENDOCRINOLOGY | Facility: CLINIC | Age: 38
End: 2023-12-18
Payer: COMMERCIAL

## 2023-12-18 ENCOUNTER — LAB VISIT (OUTPATIENT)
Dept: LAB | Facility: HOSPITAL | Age: 38
End: 2023-12-18
Payer: COMMERCIAL

## 2023-12-18 VITALS
HEIGHT: 63 IN | BODY MASS INDEX: 35 KG/M2 | DIASTOLIC BLOOD PRESSURE: 80 MMHG | WEIGHT: 197.56 LBS | SYSTOLIC BLOOD PRESSURE: 120 MMHG

## 2023-12-18 DIAGNOSIS — E22.1 HYPERPROLACTINEMIA: ICD-10-CM

## 2023-12-18 DIAGNOSIS — E22.1 HYPERPROLACTINEMIA: Primary | ICD-10-CM

## 2023-12-18 LAB — PROLACTIN SERPL IA-MCNC: 35.3 NG/ML (ref 5.2–26.5)

## 2023-12-18 PROCEDURE — 99214 PR OFFICE/OUTPT VISIT, EST, LEVL IV, 30-39 MIN: ICD-10-PCS | Mod: S$GLB,,, | Performed by: INTERNAL MEDICINE

## 2023-12-18 PROCEDURE — 99999 PR PBB SHADOW E&M-EST. PATIENT-LVL III: ICD-10-PCS | Mod: PBBFAC,,, | Performed by: INTERNAL MEDICINE

## 2023-12-18 PROCEDURE — 99214 OFFICE O/P EST MOD 30 MIN: CPT | Mod: S$GLB,,, | Performed by: INTERNAL MEDICINE

## 2023-12-18 PROCEDURE — 36415 COLL VENOUS BLD VENIPUNCTURE: CPT | Performed by: INTERNAL MEDICINE

## 2023-12-18 PROCEDURE — 84146 ASSAY OF PROLACTIN: CPT | Performed by: INTERNAL MEDICINE

## 2023-12-18 PROCEDURE — 99999 PR PBB SHADOW E&M-EST. PATIENT-LVL III: CPT | Mod: PBBFAC,,, | Performed by: INTERNAL MEDICINE

## 2023-12-18 RX ORDER — CABERGOLINE 0.5 MG/1
0.25 TABLET ORAL
Qty: 8 TABLET | Refills: 3 | Status: SHIPPED | OUTPATIENT
Start: 2023-12-18 | End: 2024-12-17

## 2023-12-18 NOTE — PROGRESS NOTES
[facility] ENDOCRINOLOGY FOLLOW UP  12/18/2023     The patient's last visit with me was on 12/22/2021.     Subjective:      CC: elevated prolactin.    HPI:   Meghna Aragon is a 38 y.o. female with hx of hyperprolactinemia who presents for evaluation of elevated prolactin.  She also has history of small bilateral thyroid nodules not meeting criteria follow-up.    Initial presentation:   Was trying to conceive for one year. Had fertility eval which included prolactin. Prolactin was found to be elevated. Pituitary MRI was negative in 2014. She was on bromocriptine 2.5 mg daily up until Jan 2016 when she conceived.  was ultimately found to have low sperm count and the patient underwent IVF in Jan 2016.     She has history of hyperprolactinemia with normal pituitary MRI.  In the past as she was trying to conceive she has been treated with cabergoline with normalization prolactin levels.  Was on cabergoline around 2018 then had IVF and cabergoline stopped.  She took cabergoline again in l2022 for a few month(s)   When thinking about trying for another pregnancy but then discontinued when she was no longer trying.           Interval history:  At her last visit with me in 2022 she recorded regular menstrual cycles on denied galactorrhea.  At that time macroprolactin was checked which was normal.  Hyperprolactinemia was confirmed and we recommended restarting cabergoline as she was planning to undergo IVF again.    She was on it for a few months then discontinued, has not had another round of IVF but she Is thinking about it in the near future    Most recent prolactin was elevated to 50 in August 2023 she was advised to restart cabergoline by her primary care provider     remains  asymptomatic    Imaging:      MRI pituitary 8/2018  The pituitary gland is normal in height.  The infundibulum is midline with no focal thickening.  The pituitary gland is normal in signal and contour.  No abnormal pituitary  enhancement.    Headache:    denies  Vision change:   Denies peripheral change  Formal Visual fields:   Not done  Galactorrhea:    Denies but had blx nipple d/c initially  Menses:    monthly cycles occurring, not on OCP, regular flow  Fertility:    Has had 2 children in the past with IVF and has frozen embryos.  is interested in another round of IVF soon    The patient is not currently using any medication known to cause hyperprolactinemia such as: antipsychotics (risperidone, phenothiazines, haloperidol and butyrophenones),gastric motility drugs (metoclopramide and domperidone), or antihypertensives (methyldopa, reserpine, and verapamil).    Lab Results   Component Value Date    PROLACTIN 49.9 (H) 08/30/2023    PROLACTIN 4.9 (L) 03/15/2022    PROLACTIN 37.7 (H) 10/23/2021    PROLACTIN 6.1 10/05/2018    PROLACTIN 55.1 (H) 08/03/2018    PROLACTIN 56.9 (H) 05/24/2017     Current symptoms:  She has a history of subcentimeter thyroid nodules with most recent ultrasound not meeting criteria for follow-up.  She is asymptomatic with no compressive symptoms and has noticed change in feel or appearance neck.  Lab Results   Component Value Date    TSH 2.776 08/30/2023         Growth Hormone Excess:  Last IGF-1:   Lab Results   Component Value Date    SOMATMDN 92 01/04/2022        DI:   []  polyuria  []  Polydipsia  []  Nocturia    [x]  Denies      Current Outpatient Medications:     cabergoline (DOSTINEX) 0.5 mg tablet, Take 0.5 tablets (0.25 mg total) by mouth twice a week., Disp: 8 tablet, Rfl: 3    ferrous sulfate (FEOSOL) 325 mg (65 mg iron) Tab tablet, Take 325 mg by mouth daily with breakfast., Disp: , Rfl:     loratadine (CLARITIN) 10 mg tablet, Take 10 mg by mouth once daily., Disp: , Rfl:     NIFEdipine (PROCARDIA-XL) 30 MG (OSM) 24 hr tablet, Take 1 tablet (30 mg total) by mouth once daily., Disp: 90 tablet, Rfl: 3    PNV NO10/IRON FUM&P/FA/OMEGA-3 (PNV #10-IRON FUM&EUT-TA-DBOIW7 ORAL), Take by mouth., Disp: , Rfl:      red yeast rice 600 mg Tab, Take 1,200 mg by mouth once daily., Disp: , Rfl:     triamcinolone (NASACORT) 55 mcg nasal inhaler, 2 sprays by Nasal route once daily., Disp: 17 g, Rfl: 5    ROS: see HPI     Objective:   Physical Exam   There were no vitals taken for this visit.  Wt Readings from Last 3 Encounters:   11/28/23 89.9 kg (198 lb 1.6 oz)   08/25/23 85.4 kg (188 lb 4.4 oz)   08/25/23 85.9 kg (189 lb 6 oz)   ]    Constitutional:  Pleasant,  in no acute distress.   HENT:   Head:    Normocephalic and atraumatic.   Eyes:    No scleral icterus.   Neck:    No thyromegaly or palpable thyroid nodules, no cervical LAD  Cardiovascular:  Normal rate, regular rhythm, 2+ radial pulses blx   Respiratory:   Effort normal   Gastrointestinal: Soft, nontender  Skin:    Skin is warm, dry  Psych:   Normal mood and affect.   Extremity:  No edema or wounds, no deformity     LABORATORY REVIEW:    Chemistry        Component Value Date/Time     08/30/2023 0719    K 3.5 08/30/2023 0719     08/30/2023 0719    CO2 23 08/30/2023 0719    BUN 11 08/30/2023 0719    CREATININE 0.8 08/30/2023 0719    GLU 96 08/30/2023 0719        Component Value Date/Time    CALCIUM 9.1 08/30/2023 0719    ALKPHOS 51 (L) 08/30/2023 0719    AST 12 08/30/2023 0719    ALT 12 08/30/2023 0719    BILITOT 0.2 08/30/2023 0719    ESTGFRAFRICA >60 10/23/2021 0850    EGFRNONAA >60 10/23/2021 0850          Lab Results   Component Value Date    PROLACTIN 49.9 (H) 08/30/2023    PROLACTIN 4.9 (L) 03/15/2022    PROLACTIN 37.7 (H) 10/23/2021    PROLACTIN 6.1 10/05/2018    TSH 2.776 08/30/2023    TSH 0.907 10/23/2021    TSH 1.408 11/07/2019    TSH 0.989 05/18/2018    FSH 1.80 10/11/2014    SOMATMDN 92 01/04/2022     08/30/2023     10/23/2021     11/20/2020     11/07/2019    K 3.5 08/30/2023    K 3.9 10/23/2021    K 4.0 11/20/2020    K 4.4 11/07/2019    CALCIUM 9.1 08/30/2023    CALCIUM 8.9 10/23/2021    CALCIUM 9.0 11/20/2020    CALCIUM  9.7 11/07/2019    ALBUMIN 3.4 (L) 08/30/2023    ALBUMIN 3.7 10/23/2021    ALBUMIN 4.0 01/07/2021    ALBUMIN 3.8 11/20/2020    ESTGFRAFRICA >60 10/23/2021    ESTGFRAFRICA >60 11/20/2020    ESTGFRAFRICA >60 11/07/2019    ESTGFRAFRICA >60 08/30/2019    EGFRNONAA >60 10/23/2021    EGFRNONAA >60 11/20/2020    EGFRNONAA >60 11/07/2019    EGFRNONAA >60 08/30/2019         Assessment/Plan:     Problem List Items Addressed This Visit          1 - High    Hyperprolactinemia - Primary     History of mild hyperprolactinemia with normal pituitary MRI.  Patient may have small micro prolactinoma that is not visible on imaging.  Given regular menstrual cycles care and no symptoms of galactorrhea we checked macroprolactin in 2022 and found that patient had true prolactin elevated.     TSH and IGF-1 normal.     As she is considering undergoing IVF again, will restart cabergoline to normalize prolactin but can discontinue medication once she becomes pregnant.  We reviewed that we typically do not trend prolactin levels during pregnancy or breastfeeding but would start rechecking 2 months after delivery if not breastfeeding or 2 months after she completes breastfeeding.  In the future we would only continue cabergoline if she had bothersome symptoms, irregular menstrual cycles not on birth control, or was trying for pregnancy.         Relevant Medications    cabergoline (DOSTINEX) 0.5 mg tablet    Other Relevant Orders    Prolactin    Prolactin     Return to clinic in 1 year (virtual okay)  Prolactin 2 month(s) Lab today    Eduardo Perrin MD

## 2023-12-18 NOTE — ASSESSMENT & PLAN NOTE
History of mild hyperprolactinemia with normal pituitary MRI.  Patient may have small micro prolactinoma that is not visible on imaging.  Given regular menstrual cycles care and no symptoms of galactorrhea we checked macroprolactin in 2022 and found that patient had true prolactin elevated.     TSH and IGF-1 normal.     As she is considering undergoing IVF again, will restart cabergoline to normalize prolactin but can discontinue medication once she becomes pregnant.  We reviewed that we typically do not trend prolactin levels during pregnancy or breastfeeding but would start rechecking 2 months after delivery if not breastfeeding or 2 months after she completes breastfeeding.  In the future we would only continue cabergoline if she had bothersome symptoms, irregular menstrual cycles not on birth control, or was trying for pregnancy.

## 2023-12-18 NOTE — PATIENT INSTRUCTIONS
Start cabergoline 0.25 mg (1/2 tablet) twice a week.  It is best if you take it at bedtime with a small healthy snack so you can sleep through any possible side effects.  I recommend using a pill box for this to make sure you are not missing any doses.  If later in the week you notice that you have missed a pill you can always make it up by taking it on a different day.  The important thing is that you take the same amount each week.      Sometimes cabergoline can cause a runny nose, dizziness, and upset stomach but it is usually not a problem if you take it at night with some food.  Please let me know if you have any bad side effects or if you are having trouble getting the medication.      At much higher doses this medication has been used to treat other conditions like Parkinson's disease and at those high doses there have been some reports of behavior changes like excessive gambling or shopping.  We almost never see that happen with the small doses we use to treat high prolactin but if you notice any of these changes please let me know right away.      Stop the cabergoline if you are pregnant.  We don't check prolactin levels during pregnancy or breastfeeding. We start checking 2 month(s) after delivery or 2 month(s) after you stop breastfeeding.

## 2024-01-27 NOTE — PROGRESS NOTES
[facility]: Health  Follow-Up    Stated that everything was going well.     No changes to diet or exercise, just jarrell along.     Stated that she was going to make a better effort to take more readings.     The history is provided by the patient.     Follow Up  Follow-up reason(s): reading review and routine education      Readings are missing.   patient reminder needed.  Routine Education Topics: eating patterns and physical activity        INTERVENTION(S)  encouragement/support, denied resources and denied questions    PLAN  patient verbalizes understanding      There are no preventive care reminders to display for this patient.    Last 5 Patient Entered Readings                                      Current 30 Day Average:      Recent Readings 1/18/2020 12/19/2019 12/18/2019 12/6/2019 11/26/2019    SBP (mmHg) 145 147 164 140 148    DBP (mmHg) 82 89 95 88 85    Pulse 85 76 69 79 75                      Diet Screening   No change to diet.      Physical Activity Screening   No change to exercise routine.          SDOH  
2-3.9 cm

## 2024-02-19 ENCOUNTER — LAB VISIT (OUTPATIENT)
Dept: LAB | Facility: HOSPITAL | Age: 39
End: 2024-02-19
Payer: COMMERCIAL

## 2024-02-19 DIAGNOSIS — E22.1 HYPERPROLACTINEMIA: ICD-10-CM

## 2024-02-19 PROCEDURE — 36415 COLL VENOUS BLD VENIPUNCTURE: CPT | Performed by: INTERNAL MEDICINE

## 2024-02-19 PROCEDURE — 84146 ASSAY OF PROLACTIN: CPT | Performed by: INTERNAL MEDICINE

## 2024-02-20 LAB — PROLACTIN SERPL IA-MCNC: 5.4 NG/ML (ref 5.2–26.5)

## 2024-03-01 ENCOUNTER — OFFICE VISIT (OUTPATIENT)
Dept: BARIATRICS | Facility: CLINIC | Age: 39
End: 2024-03-01
Payer: COMMERCIAL

## 2024-03-01 VITALS
SYSTOLIC BLOOD PRESSURE: 120 MMHG | WEIGHT: 202.31 LBS | OXYGEN SATURATION: 98 % | HEART RATE: 84 BPM | HEIGHT: 64 IN | DIASTOLIC BLOOD PRESSURE: 78 MMHG | BODY MASS INDEX: 34.54 KG/M2

## 2024-03-01 DIAGNOSIS — I10 HYPERTENSION, BENIGN: ICD-10-CM

## 2024-03-01 DIAGNOSIS — E66.01 CLASS 2 SEVERE OBESITY DUE TO EXCESS CALORIES WITH SERIOUS COMORBIDITY AND BODY MASS INDEX (BMI) OF 35.0 TO 35.9 IN ADULT: Primary | ICD-10-CM

## 2024-03-01 DIAGNOSIS — Z71.3 ENCOUNTER FOR WEIGHT LOSS COUNSELING: ICD-10-CM

## 2024-03-01 PROCEDURE — 99213 OFFICE O/P EST LOW 20 MIN: CPT | Mod: S$GLB,,, | Performed by: STUDENT IN AN ORGANIZED HEALTH CARE EDUCATION/TRAINING PROGRAM

## 2024-03-01 PROCEDURE — 99999 PR PBB SHADOW E&M-EST. PATIENT-LVL IV: CPT | Mod: PBBFAC,,, | Performed by: STUDENT IN AN ORGANIZED HEALTH CARE EDUCATION/TRAINING PROGRAM

## 2024-03-01 NOTE — PROGRESS NOTES
Subjective:       Patient ID: Meghna Aragon is a 38 y.o. female.    Chief Complaint: Follow-up, Obesity, and Weight Check      Patient presents for treatment of obesity.   Has tried WW, keto, portion control; loses weight but can't keep it off    Co-morbidities:   HTN  Thyroid Nodule - U/S 11/2021: Two small subcentimeter nodules which do not meet ACR TI rad criteria for fine-needle aspiration or follow-up.    Negative for thyroid cancer    Current Physical Activity  No regular exercise routine    Current Eating Habits  Breakfast - egg, antonio, and cheese sandwich; protein shake; on weekends, grits, egg, and antonio  Lunch - gumbo, grilled or fried chicken sandwiches; red beans and rice  Dinner - wings, brisket, pulled pork, salads, green beans, peas  Snacks - chips, cookies, ice cream for dessert  Beverages    Tuna salad and chicken salad made with bradley  Protein shakes  Sandwiches    Medical Weight Loss  10/18/2022: 210.9 lbs, BMI 36.8, BFP 41.2%, BFM 86.8 lbs, SMM 69.2 lbs, BMR 1586 kcal  1/6/2023: 201.8 lbs, BMI 35.2, BFP 40.4%, BFM 81.7 lbs, SMM 67 lbs, BMR 1548 kcal  3/15/2023: 194.4 lbs, BMI 33.9, BFP 39.6%, BFM 77.1 lbs, SMM 65.5 lbs, BMR 1520  6/23/2023: 182.2 lbs, BMI 31.8, BFP 37.1%, BFM 67.6 lbs, SMM 64.6 lbs, BMR 1494 kcal  11/28/2023: 198.1 lbs, BMI 34.5, BFP 39.1%, BFM 77.5 lbs, SMM 66.6 lbs, BMR 1552 kcal  3/1/2024: 202.3 lbs, BMI 35.3, BFP 39.8%, BFM 80.6 lbs, SMM 67.2 lbs, BMR 1563 kcal    Metformin caused upset stomach and pain      Review of Systems   Constitutional:  Negative for chills and fever.   Respiratory:  Negative for shortness of breath.    Cardiovascular:  Negative for chest pain and palpitations.   Gastrointestinal:  Negative for abdominal pain, nausea and vomiting.   Neurological:  Negative for dizziness and light-headedness.   Psychiatric/Behavioral:  The patient is not nervous/anxious.          Objective:       Latest Reference Range & Units 08/30/23 07:19   WBC 3.90 - 12.70  K/uL 6.13   RBC 4.00 - 5.40 M/uL 4.22   Hemoglobin 12.0 - 16.0 g/dL 11.1 (L)   Hematocrit 37.0 - 48.5 % 35.4 (L)   MCV 82 - 98 fL 84   MCH 27.0 - 31.0 pg 26.3 (L)   MCHC 32.0 - 36.0 g/dL 31.4 (L)   RDW 11.5 - 14.5 % 14.6 (H)   Platelet Count 150 - 450 K/uL 288   MPV 9.2 - 12.9 fL 12.1   Gran % 38.0 - 73.0 % 60.0   Lymph % 18.0 - 48.0 % 30.5   Mono % 4.0 - 15.0 % 7.3   Eosinophil % 0.0 - 8.0 % 1.5   Basophil % 0.0 - 1.9 % 0.5   Immature Granulocytes 0.0 - 0.5 % 0.2   Gran # (ANC) 1.8 - 7.7 K/uL 3.7   Lymph # 1.0 - 4.8 K/uL 1.9   Mono # 0.3 - 1.0 K/uL 0.5   Eos # 0.0 - 0.5 K/uL 0.1   Baso # 0.00 - 0.20 K/uL 0.03   Immature Grans (Abs) 0.00 - 0.04 K/uL 0.01   nRBC 0 /100 WBC 0   Differential Method  Automated   Iron 30 - 160 ug/dL 98   TIBC 250 - 450 ug/dL 386   Saturated Iron 20 - 50 % 25   Transferrin 200 - 375 mg/dL 261   Ferritin 20.0 - 300.0 ng/mL 17 (L)   Sodium 136 - 145 mmol/L 139   Potassium 3.5 - 5.1 mmol/L 3.5   Chloride 95 - 110 mmol/L 106   CO2 23 - 29 mmol/L 23   Anion Gap 8 - 16 mmol/L 10   BUN 6 - 20 mg/dL 11   Creatinine 0.5 - 1.4 mg/dL 0.8   eGFR >60 mL/min/1.73 m^2 >60   Glucose 70 - 110 mg/dL 96   Calcium 8.7 - 10.5 mg/dL 9.1   ALP 55 - 135 U/L 51 (L)   PROTEIN TOTAL 6.0 - 8.4 g/dL 6.5   Albumin 3.5 - 5.2 g/dL 3.4 (L)   BILIRUBIN TOTAL 0.1 - 1.0 mg/dL 0.2   AST 10 - 40 U/L 12   ALT 10 - 44 U/L 12   Cholesterol Total 120 - 199 mg/dL 195   HDL 40 - 75 mg/dL 49   HDL/Cholesterol Ratio 20.0 - 50.0 % 25.1   Non-HDL Cholesterol mg/dL 146   Total Cholesterol/HDL Ratio 2.0 - 5.0  4.0   Triglycerides 30 - 150 mg/dL 170 (H)   LDL Cholesterol 63.0 - 159.0 mg/dL 112.0   Hemoglobin A1C External 4.0 - 5.6 % 5.5   Estimated Avg Glucose 68 - 131 mg/dL 111   TSH 0.400 - 4.000 uIU/mL 2.776   Prolactin 5.2 - 26.5 ng/mL 49.9 (H)   (L): Data is abnormally low  (H): Data is abnormally high      Vitals:    03/01/24 1550   BP: 120/78   Pulse: 84           Physical Exam  Vitals reviewed.   Constitutional:       General: She  is not in acute distress.     Appearance: Normal appearance. She is obese. She is not ill-appearing, toxic-appearing or diaphoretic.   HENT:      Head: Normocephalic and atraumatic.   Cardiovascular:      Rate and Rhythm: Normal rate.   Pulmonary:      Effort: Pulmonary effort is normal. No respiratory distress.   Skin:     General: Skin is warm and dry.   Neurological:      Mental Status: She is alert and oriented to person, place, and time.         Assessment:       Problem List Items Addressed This Visit       Hypertension, benign     Other Visit Diagnoses       Class 2 severe obesity due to excess calories with serious comorbidity and body mass index (BMI) of 35.0 to 35.9 in adult    -  Primary    Encounter for weight loss counseling                    Plan:     - Log all food and beverage intake with a daily calorie goal of 1200 calories per day    - Moderate intensity aerobic exercise for 150 minutes per week

## 2024-05-23 ENCOUNTER — OFFICE VISIT (OUTPATIENT)
Dept: PRIMARY CARE CLINIC | Facility: CLINIC | Age: 39
End: 2024-05-23
Payer: COMMERCIAL

## 2024-05-23 VITALS
HEIGHT: 64 IN | HEART RATE: 99 BPM | BODY MASS INDEX: 34.33 KG/M2 | SYSTOLIC BLOOD PRESSURE: 125 MMHG | TEMPERATURE: 99 F | DIASTOLIC BLOOD PRESSURE: 83 MMHG | WEIGHT: 201.06 LBS | OXYGEN SATURATION: 96 %

## 2024-05-23 DIAGNOSIS — J02.9 PHARYNGITIS, UNSPECIFIED ETIOLOGY: ICD-10-CM

## 2024-05-23 DIAGNOSIS — E66.01 SEVERE OBESITY: ICD-10-CM

## 2024-05-23 DIAGNOSIS — A49.1 STREPTOCOCCAL INFECTION: Primary | ICD-10-CM

## 2024-05-23 DIAGNOSIS — I10 HYPERTENSION, BENIGN: ICD-10-CM

## 2024-05-23 LAB
CTP QC/QA: YES
POC MOLECULAR INFLUENZA A AGN: NEGATIVE
POC MOLECULAR INFLUENZA B AGN: NEGATIVE
S PYO RRNA THROAT QL PROBE: POSITIVE
SARS-COV-2 RDRP RESP QL NAA+PROBE: NEGATIVE

## 2024-05-23 PROCEDURE — 99214 OFFICE O/P EST MOD 30 MIN: CPT | Mod: S$GLB,,, | Performed by: NURSE PRACTITIONER

## 2024-05-23 PROCEDURE — 99999 PR PBB SHADOW E&M-EST. PATIENT-LVL IV: CPT | Mod: PBBFAC,,, | Performed by: NURSE PRACTITIONER

## 2024-05-23 PROCEDURE — 87502 INFLUENZA DNA AMP PROBE: CPT | Mod: QW,S$GLB,, | Performed by: NURSE PRACTITIONER

## 2024-05-23 PROCEDURE — 87635 SARS-COV-2 COVID-19 AMP PRB: CPT | Mod: QW,S$GLB,, | Performed by: NURSE PRACTITIONER

## 2024-05-23 PROCEDURE — 87880 STREP A ASSAY W/OPTIC: CPT | Mod: QW,S$GLB,, | Performed by: NURSE PRACTITIONER

## 2024-05-23 RX ORDER — AMOXICILLIN 500 MG/1
500 TABLET, FILM COATED ORAL EVERY 12 HOURS
Qty: 20 TABLET | Refills: 0 | Status: SHIPPED | OUTPATIENT
Start: 2024-05-23 | End: 2024-06-02

## 2024-05-23 NOTE — PROGRESS NOTES
Subjective:       Patient ID: Meghna Aragon is a 39 y.o. female.    Chief Complaint: Sore Throat    Ms. Meghna Alvarado  is a 39 year old female, new to me, presents to the clinic today with a sre throat.  PCP is Maude Fernandez. Medical and surgical history in addition to problem list reviewed as listed below.     Sore Throat   This is a new problem. The current episode started in the past 7 days. The problem has been unchanged. Neither side of throat is experiencing more pain than the other. There has been no fever. The pain is at a severity of 8/10. The pain is severe. Associated symptoms include coughing, neck pain and swollen glands. Pertinent negatives include no congestion, drooling, ear discharge, ear pain, headaches, hoarse voice or trouble swallowing. She has had exposure to strep. Exposure to: son positive for strpt at the end of 2024. She has tried NSAIDs (mucinex) for the symptoms. The treatment provided mild relief.       Past Medical History:   Diagnosis Date    Anemia     Hyperprolactinemia 2017    Ovarian cyst 2016    dermoid on MRI        Past Surgical History:   Procedure Laterality Date    MOUTH SURGERY          Family History   Problem Relation Name Age of Onset    Heart attacks under age 50 Father Veto 44    Heart disease Father Veto          at 44 years old; massive heart attack    Heart disease Paternal Grandmother Priti Lafleur         Alive; had bypass surgery    Hypertension Mother Lorena         Alive    Thyroid disease Mother Lorena     Heart disease Paternal Aunt Vicky         ; had stroke and triple bipass surgery    Stroke Paternal Aunt Vicky     Heart disease Paternal Uncle Fer         Alive, had heart attack and bypass surgery    No Known Problems Sister 1/2     Heart disease Brother 1/2         Had heart attack    No Known Problems Son      No Known Problems Brother 1/2     No Known Problems Brother 1/2     No Known Problems Brother 1/2      "Diabetes Maternal Grandmother Maryann         Alive    Heart disease Paternal Aunt Minerva         ; had stents placed    Cancer Neg Hx      Thyroid cancer Neg Hx      Breast cancer Neg Hx      Colon cancer Neg Hx      Ovarian cancer Neg Hx         Social History     Tobacco Use   Smoking Status Never   Smokeless Tobacco Never       Social History     Social History Narrative    Not on file       Review of patient's allergies indicates:   Allergen Reactions    Codeine Itching        Review of Systems   HENT:  Positive for sore throat. Negative for congestion, drooling, ear discharge, ear pain, hoarse voice and trouble swallowing.    Respiratory:  Positive for cough.    Musculoskeletal:  Positive for neck pain.   Neurological:  Negative for headaches.         Objective:      Vitals:    24 1613   BP: 125/83   BP Location: Right arm   Patient Position: Sitting   BP Method: Large (Automatic)   Pulse: 99   Temp: 98.9 °F (37.2 °C)   TempSrc: Oral   SpO2: 96%   Weight: 91.2 kg (201 lb 1 oz)   Height: 5' 3.5" (1.613 m)     Physical Exam  Constitutional:       Appearance: Normal appearance.   HENT:      Mouth/Throat:      Pharynx: Pharyngeal swelling, oropharyngeal exudate and posterior oropharyngeal erythema present. No uvula swelling.      Tonsils: Tonsillar exudate present. No tonsillar abscesses.   Pulmonary:      Effort: Pulmonary effort is normal. No respiratory distress.   Musculoskeletal:         General: Normal range of motion.      Cervical back: Normal range of motion.   Skin:     General: Skin is dry.      Capillary Refill: Capillary refill takes less than 2 seconds.   Neurological:      Mental Status: She is alert and oriented to person, place, and time.         Assessment:       1. Streptococcal infection    2. Pharyngitis, unspecified etiology    3. Hypertension, benign    4. Severe obesity        Plan:       Streptococcal infection  -     amoxicillin (AMOXIL) 500 MG Tab; Take 1 tablet (500 mg total) " by mouth every 12 (twelve) hours. for 10 days  Dispense: 20 tablet; Refill: 0    Pharyngitis, unspecified etiology  -     POCT COVID-19 Rapid Screening-Negative; Future; Expected date: 05/23/2024  -     POCT Influenza A/B Molecular-Negative  -     POCT rapid strep A-Positive    Hypertension, benign  Stable, continuing current management.     Severe obesity  Recommend Dash/Mediterranean diet, exercise 3 times a week for 30 minute intervals, increase as tolerated.       Medication List with Changes/Refills   New Medications    AMOXICILLIN (AMOXIL) 500 MG TAB    Take 1 tablet (500 mg total) by mouth every 12 (twelve) hours. for 10 days   Current Medications    CABERGOLINE (DOSTINEX) 0.5 MG TABLET    Take 0.5 tablets (0.25 mg total) by mouth twice a week.    FERROUS SULFATE (FEOSOL) 325 MG (65 MG IRON) TAB TABLET    Take 325 mg by mouth daily with breakfast.    LORATADINE (CLARITIN) 10 MG TABLET    Take 10 mg by mouth once daily.    NIFEDIPINE (PROCARDIA-XL) 30 MG (OSM) 24 HR TABLET    Take 1 tablet (30 mg total) by mouth once daily.    PNV NO10/IRON FUM&P/FA/OMEGA-3 (PNV #10-IRON [id number] ORAL)    Take by mouth.    RED YEAST RICE 600 MG TAB    Take 1,200 mg by mouth once daily.    TRIAMCINOLONE (NASACORT) 55 MCG NASAL INHALER    2 sprays by Nasal route once daily.        Follow up if symptoms worsen or fail to improve.    Gay Yuan APRN, MSN, FNP-C

## 2024-06-28 DIAGNOSIS — E22.1 HYPERPROLACTINEMIA: ICD-10-CM

## 2024-06-28 RX ORDER — CABERGOLINE 0.5 MG/1
TABLET ORAL
Qty: 24 TABLET | Refills: 2 | Status: SHIPPED | OUTPATIENT
Start: 2024-06-28

## 2024-08-30 ENCOUNTER — OFFICE VISIT (OUTPATIENT)
Dept: INTERNAL MEDICINE | Facility: CLINIC | Age: 39
End: 2024-08-30
Attending: INTERNAL MEDICINE
Payer: COMMERCIAL

## 2024-08-30 VITALS
WEIGHT: 204.13 LBS | HEIGHT: 64 IN | OXYGEN SATURATION: 99 % | SYSTOLIC BLOOD PRESSURE: 126 MMHG | DIASTOLIC BLOOD PRESSURE: 72 MMHG | HEART RATE: 69 BPM | BODY MASS INDEX: 34.85 KG/M2

## 2024-08-30 DIAGNOSIS — I10 HYPERTENSION, BENIGN: ICD-10-CM

## 2024-08-30 DIAGNOSIS — E04.1 THYROID NODULE: ICD-10-CM

## 2024-08-30 DIAGNOSIS — F43.21 ADJUSTMENT DISORDER WITH DEPRESSED MOOD: ICD-10-CM

## 2024-08-30 DIAGNOSIS — D50.9 IRON DEFICIENCY ANEMIA, UNSPECIFIED IRON DEFICIENCY ANEMIA TYPE: ICD-10-CM

## 2024-08-30 DIAGNOSIS — Z00.00 ANNUAL PHYSICAL EXAM: Primary | ICD-10-CM

## 2024-08-30 DIAGNOSIS — E22.1 HYPERPROLACTINEMIA: ICD-10-CM

## 2024-08-30 DIAGNOSIS — R73.01 IFG (IMPAIRED FASTING GLUCOSE): ICD-10-CM

## 2024-08-30 PROCEDURE — 99999 PR PBB SHADOW E&M-EST. PATIENT-LVL V: CPT | Mod: PBBFAC,,, | Performed by: INTERNAL MEDICINE

## 2024-08-30 RX ORDER — BUPROPION HYDROCHLORIDE 100 MG/1
100 TABLET, EXTENDED RELEASE ORAL DAILY
Qty: 30 TABLET | Refills: 1 | Status: SHIPPED | OUTPATIENT
Start: 2024-08-30 | End: 2025-08-30

## 2024-08-30 NOTE — PROGRESS NOTES
Subjective:   Patient ID: Meghna Aragon is a 39 y.o. female  Chief complaint:   Chief Complaint   Patient presents with    Annual Exam       HPI  Pt here for annual exam     Kids: now 5 years and 7 years ago    6yo son dx with autism earlier this year - he is in several therapies   She is overwhelmed at times - tearful - depressed mood for several years but putting on a smile on most days and typically does not discuss her mood   - no si/hi/robledo  - never treated for mood or anxiety in the past   - interested in counseling at this time     HTN:   - taking procardia 30mg daily - not checking at home   Previously:  Pregnancy complicated by pre eclampsia - started on labetalol at that time   - not checking bp at home but still has bp cuff from digital MOM program     Obesity:   Gained about 20 pounds over the past year or so   Previously:   - lost weight since 2021 (215 pounds)  - on ozempic again but now issues with insurance - waiting on PA  - had some diarrhea in June - held ozempic - sx resolved and then resumed med and new so far   - to cont 0.5 for now and will revisit 1mg dose in future    HLD:   started on cholesterol medication in past after seen by cards and stopped taking it   - restarted and sx of back pain returned and then stopped   - started red yeast but taking inconsistently    Thyroid nodule:   - had thyroid ultrasound 5/2018 and 11/2021:- stable   - Seen by endo 8/2018 and repeat us ordered and overdue - was due 18-24 months from prior 5/2018    Hyperprolactinemia: followed by endo:  - followed by endo - lcv 12/2023  - prolactin level 5.4 <- 35 <- 49 with rx   Prev:   - prolactin level dec with tx - stopped taking medication after skipping doses   - seen prior to pregnancy   - no longer breastfeeding  - no galactorrhea     PETERSON:   Having monthly periods   taking oral iron qod at this time    HM:  Flu, covid     Endo: bro[facility]lynn  Gyn: cristian 8/2023  Bariatrics: dauterive    Review of  "Systems    Objective:  Vitals:    08/30/24 1232 08/30/24 1338   BP: (!) 142/86 126/72   BP Location: Left arm    Patient Position: Sitting    Pulse: 69    SpO2: 99%    Weight: 92.6 kg (204 lb 2.3 oz)    Height: 5' 3.5" (1.613 m)        Body mass index is 35.6 kg/m².    Physical Exam  Vitals reviewed.   Constitutional:       Appearance: Normal appearance. She is well-developed.   HENT:      Head: Normocephalic and atraumatic.      Right Ear: Tympanic membrane, ear canal and external ear normal.      Left Ear: Tympanic membrane, ear canal and external ear normal.      Nose: Nose normal. No congestion.      Mouth/Throat:      Mouth: Mucous membranes are moist.      Pharynx: Oropharynx is clear. No oropharyngeal exudate.   Eyes:      Extraocular Movements: Extraocular movements intact.      Conjunctiva/sclera: Conjunctivae normal.   Neck:      Thyroid: No thyromegaly.   Cardiovascular:      Rate and Rhythm: Normal rate and regular rhythm.      Pulses: Normal pulses.      Heart sounds: Normal heart sounds.   Pulmonary:      Effort: Pulmonary effort is normal.      Breath sounds: Normal breath sounds.   Abdominal:      General: Bowel sounds are normal.      Palpations: Abdomen is soft.   Musculoskeletal:         General: No swelling or tenderness.      Cervical back: Neck supple.   Lymphadenopathy:      Cervical: No cervical adenopathy.   Skin:     General: Skin is warm and dry.      Capillary Refill: Capillary refill takes less than 2 seconds.   Neurological:      General: No focal deficit present.      Mental Status: She is alert and oriented to person, place, and time. Mental status is at baseline.   Psychiatric:         Behavior: Behavior normal.         Thought Content: Thought content normal.       Assessment:  1. Annual physical exam    2. Hypertension, benign    3. Hyperprolactinemia    4. Iron deficiency anemia, unspecified iron deficiency anemia type    5. IFG (impaired fasting glucose)    6. Adjustment disorder " with depressed mood    7. Thyroid nodule      Plan:  Meghna was seen today for annual exam.    Diagnoses and all orders for this visit:    Annual physical exam  -     Hemoglobin A1C; Future  -     TSH; Future  -     Lipid Panel; Future  -     CBC Auto Differential; Future  -     Comprehensive Metabolic Panel; Future  -     FERRITIN; Future  -     IRON AND TIBC; Future    Hypertension, benign  Stable   Con tmed     Hyperprolactinemia  Stable   Cnot med and endo f/u     Iron deficiency anemia, unspecified iron deficiency anemia type  -     FERRITIN; Future  -     IRON AND TIBC; Future    IFG (impaired fasting glucose)  -     Hemoglobin A1C; Future    Adjustment disorder with depressed mood  -     Ambulatory referral/consult to Psychology; Future  -     buPROPion (WELLBUTRIN SR) 100 MG TBSR 12 hr tablet; Take 1 tablet (100 mg total) by mouth once daily.  Start trial of wellbutrin once daily - if new will move to xl at 150  If not new will consider prozac and can consider buspar if anxiety worse  Agree with counseling   Graded exercise     Thyroid nodule  Last us stable and did not require f/u or bx     cont med  Recommend daily sunscreen, cardiovascular exercise min 30 min 5 days per week. Seatbelts routinely.  Resume bp checks   Low salt diet   Graded exercise program       Health Maintenance   Topic Date Due    TETANUS VACCINE  07/26/2029    Hepatitis C Screening  Completed    Lipid Panel  Completed

## 2024-08-30 NOTE — PATIENT INSTRUCTIONS
Call to make an appointment within [facility] for psychiatry/psychology [phone]     Other psychiatrists:   Katlyn Harrington(psychiatrist) [address] Phone: [phone]   To Subramanian (psychiatrist) [phone], [phone] [address] [place] [address]   Dr. Broderick Fox - [phone]   Dr. Leydi Anaya - [phone]     [facility]: [phone]     Therapy/Psychology:   You can try anyone of these number to see if your insurance is accepted or you would have to call your insurance.     Cognitive Behavioral Therapy (CBT) Center [facility]   Address: [facility] [place] [address]   Phone: [phone]   [email or web address]     [facility] [address], [address]   Phone: [phone]   You can email for an appointment at: [email or web address]     [facility]   [address] [place] [address], [facility], [address]   [email or web address]   Dr. Gloria Tubbs, [phone] or [email or web address]   Dr. Jacy Stephenson, [phone] or [email or web address]     Luci Bui    LCSW (therapist) [phone]   [address] [place] [address]   Jolly Alberts LCSW (therapist) [phone]   [address] [place] [address]   Prema Stein LCSW (therapist) [phone]   [address] [place] [address]   ADAIR Subramanian         LCSW                    [phone]   Óscar Daniels [phone] (therapist) [facility]3 [facility]   José Antonio Alberts (therapist) [phone]  [facility]4 [place] Angelita Khanna (therapist) [phone] [address] [place] [address]     Behavior Health Counseling [phone]   [facility] [place] [address]     Employee Assistance Program (EAP)   Check through your employer's HR.     Online Therapist:     [email or web address]     Free Guided Meditations   [email or web address]   [email or web address]    [email or web address]/pages/audio.aspx

## 2024-09-04 ENCOUNTER — LAB VISIT (OUTPATIENT)
Dept: LAB | Facility: HOSPITAL | Age: 39
End: 2024-09-04
Attending: INTERNAL MEDICINE
Payer: COMMERCIAL

## 2024-09-04 DIAGNOSIS — Z00.00 ANNUAL PHYSICAL EXAM: ICD-10-CM

## 2024-09-04 DIAGNOSIS — R73.01 IFG (IMPAIRED FASTING GLUCOSE): ICD-10-CM

## 2024-09-04 DIAGNOSIS — D50.9 IRON DEFICIENCY ANEMIA, UNSPECIFIED IRON DEFICIENCY ANEMIA TYPE: ICD-10-CM

## 2024-09-04 LAB
ALBUMIN SERPL BCP-MCNC: 3.7 G/DL (ref 3.5–5.2)
ALP SERPL-CCNC: 57 U/L (ref 55–135)
ALT SERPL W/O P-5'-P-CCNC: 15 U/L (ref 10–44)
ANION GAP SERPL CALC-SCNC: 7 MMOL/L (ref 8–16)
AST SERPL-CCNC: 15 U/L (ref 10–40)
BASOPHILS # BLD AUTO: 0.02 K/UL (ref 0–0.2)
BASOPHILS NFR BLD: 0.3 % (ref 0–1.9)
BILIRUB SERPL-MCNC: 0.2 MG/DL (ref 0.1–1)
BUN SERPL-MCNC: 13 MG/DL (ref 6–20)
CALCIUM SERPL-MCNC: 9.5 MG/DL (ref 8.7–10.5)
CHLORIDE SERPL-SCNC: 106 MMOL/L (ref 95–110)
CHOLEST SERPL-MCNC: 252 MG/DL (ref 120–199)
CHOLEST/HDLC SERPL: 4.6 {RATIO} (ref 2–5)
CO2 SERPL-SCNC: 25 MMOL/L (ref 23–29)
CREAT SERPL-MCNC: 0.9 MG/DL (ref 0.5–1.4)
DIFFERENTIAL METHOD BLD: ABNORMAL
EOSINOPHIL # BLD AUTO: 0.1 K/UL (ref 0–0.5)
EOSINOPHIL NFR BLD: 1.1 % (ref 0–8)
ERYTHROCYTE [DISTWIDTH] IN BLOOD BY AUTOMATED COUNT: 14.6 % (ref 11.5–14.5)
EST. GFR  (NO RACE VARIABLE): >60 ML/MIN/1.73 M^2
ESTIMATED AVG GLUCOSE: 114 MG/DL (ref 68–131)
FERRITIN SERPL-MCNC: 25 NG/ML (ref 20–300)
GLUCOSE SERPL-MCNC: 102 MG/DL (ref 70–110)
HBA1C MFR BLD: 5.6 % (ref 4–5.6)
HCT VFR BLD AUTO: 39.3 % (ref 37–48.5)
HDLC SERPL-MCNC: 55 MG/DL (ref 40–75)
HDLC SERPL: 21.8 % (ref 20–50)
HGB BLD-MCNC: 12.2 G/DL (ref 12–16)
IMM GRANULOCYTES # BLD AUTO: 0.03 K/UL (ref 0–0.04)
IMM GRANULOCYTES NFR BLD AUTO: 0.4 % (ref 0–0.5)
IRON SERPL-MCNC: 171 UG/DL (ref 30–160)
LDLC SERPL CALC-MCNC: 159.8 MG/DL (ref 63–159)
LYMPHOCYTES # BLD AUTO: 1.6 K/UL (ref 1–4.8)
LYMPHOCYTES NFR BLD: 21 % (ref 18–48)
MCH RBC QN AUTO: 26.3 PG (ref 27–31)
MCHC RBC AUTO-ENTMCNC: 31 G/DL (ref 32–36)
MCV RBC AUTO: 85 FL (ref 82–98)
MONOCYTES # BLD AUTO: 0.5 K/UL (ref 0.3–1)
MONOCYTES NFR BLD: 7 % (ref 4–15)
NEUTROPHILS # BLD AUTO: 5.2 K/UL (ref 1.8–7.7)
NEUTROPHILS NFR BLD: 70.2 % (ref 38–73)
NONHDLC SERPL-MCNC: 197 MG/DL
NRBC BLD-RTO: 0 /100 WBC
PLATELET # BLD AUTO: 350 K/UL (ref 150–450)
PMV BLD AUTO: 11.5 FL (ref 9.2–12.9)
POTASSIUM SERPL-SCNC: 4.1 MMOL/L (ref 3.5–5.1)
PROT SERPL-MCNC: 7.3 G/DL (ref 6–8.4)
RBC # BLD AUTO: 4.64 M/UL (ref 4–5.4)
SATURATED IRON: 39 % (ref 20–50)
SODIUM SERPL-SCNC: 138 MMOL/L (ref 136–145)
TOTAL IRON BINDING CAPACITY: 440 UG/DL (ref 250–450)
TRANSFERRIN SERPL-MCNC: 297 MG/DL (ref 200–375)
TRIGL SERPL-MCNC: 186 MG/DL (ref 30–150)
TSH SERPL DL<=0.005 MIU/L-ACNC: 0.76 UIU/ML (ref 0.4–4)
WBC # BLD AUTO: 7.39 K/UL (ref 3.9–12.7)

## 2024-09-04 PROCEDURE — 36415 COLL VENOUS BLD VENIPUNCTURE: CPT | Performed by: INTERNAL MEDICINE

## 2024-09-04 PROCEDURE — 85025 COMPLETE CBC W/AUTO DIFF WBC: CPT | Performed by: INTERNAL MEDICINE

## 2024-09-04 PROCEDURE — 83036 HEMOGLOBIN GLYCOSYLATED A1C: CPT | Performed by: INTERNAL MEDICINE

## 2024-09-04 PROCEDURE — 83540 ASSAY OF IRON: CPT | Performed by: INTERNAL MEDICINE

## 2024-09-04 PROCEDURE — 84443 ASSAY THYROID STIM HORMONE: CPT | Performed by: INTERNAL MEDICINE

## 2024-09-04 PROCEDURE — 82728 ASSAY OF FERRITIN: CPT | Performed by: INTERNAL MEDICINE

## 2024-09-04 PROCEDURE — 80061 LIPID PANEL: CPT | Performed by: INTERNAL MEDICINE

## 2024-09-04 PROCEDURE — 80053 COMPREHEN METABOLIC PANEL: CPT | Performed by: INTERNAL MEDICINE

## 2024-10-08 ENCOUNTER — OFFICE VISIT (OUTPATIENT)
Dept: INTERNAL MEDICINE | Facility: CLINIC | Age: 39
End: 2024-10-08
Attending: INTERNAL MEDICINE
Payer: COMMERCIAL

## 2024-10-08 VITALS — SYSTOLIC BLOOD PRESSURE: 132 MMHG | DIASTOLIC BLOOD PRESSURE: 90 MMHG

## 2024-10-08 DIAGNOSIS — F43.21 ADJUSTMENT DISORDER WITH DEPRESSED MOOD: ICD-10-CM

## 2024-10-08 DIAGNOSIS — Z51.81 ENCOUNTER FOR MEDICATION MONITORING: Primary | ICD-10-CM

## 2024-10-08 PROCEDURE — 99441 PR PHYSICIAN TELEPHONE EVALUATION 5-10 MIN: CPT | Mod: 95,,, | Performed by: INTERNAL MEDICINE

## 2024-10-08 NOTE — PROGRESS NOTES
The patient location is: [place]  The chief complaint leading to consultation is: medication monitoring    Visit type: switched to audio only due tech difficulties    Face to Face time with patient: 9 min  10 minutes of total time spent on the encounter, which includes face to face time and non-face to face time preparing to see the patient (eg, review of tests), Obtaining and/or reviewing separately obtained history, Documenting clinical information in the electronic or other health record, Independently interpreting results (not separately reported) and communicating results to the patient/family/caregiver, or Care coordination (not separately reported).     Each patient to whom he or she provides medical services by telemedicine is:  (1) informed of the relationship between the physician and patient and the respective role of any other health care provider with respect to management of the patient; and (2) notified that he or she may decline to receive medical services by telemedicine and may withdraw from such care at any time.    Notes: Subjective:   Patient ID: Meghna Aragon is a 39 y.o. female  Chief complaint:   Chief Complaint   Patient presents with    Adjustment Disorder     F/u wellbutrin     HPI    Here for med check for wellbutrin   Taking med for 1 week - felt insmnia for 2 days and now back to good sleep cycle - was taking at night and agrees to switch to am use    mentioned toda that she seemed better   She has not noticed much change yet   Mood stable - no neg SE from med     At [facility] we started low dose 100mg SR once daily   Previously:   7yo son dx with autism earlier this year - he is in several therapies   She is overwhelmed at times - tearful - depressed mood for several years but putting on a smile on most days and typically does not discuss her mood   - no si/hi/robledo  - never treated for mood or anxiety in the past   - interested in counseling at this time     Review of Systems    Constitutional:  Negative for activity change and unexpected weight change.   HENT:  Negative for hearing loss, rhinorrhea and trouble swallowing.    Eyes:  Negative for discharge and visual disturbance.   Respiratory:  Negative for chest tightness and wheezing.    Cardiovascular:  Negative for chest pain and palpitations.   Gastrointestinal:  Negative for blood in stool, constipation, diarrhea and vomiting.   Endocrine: Negative for polydipsia and polyuria.   Genitourinary:  Negative for difficulty urinating, dysuria, hematuria and menstrual problem.   Musculoskeletal:  Negative for arthralgias, joint swelling and neck pain.   Neurological:  Negative for weakness and headaches.   Psychiatric/Behavioral:  Positive for dysphoric mood. Negative for confusion.        Objective:  Vitals:    10/08/24 1522   BP: (!) 132/90     There is no height or weight on file to calculate BMI.    Physical Exam    Assessment:  1. Encounter for medication monitoring    2. Adjustment disorder with depressed mood        Plan:  1. Encounter for medication monitoring    2. Adjustment disorder with depressed mood      Continue 100sr for now for next 3 weeks   She will update me on how doing through portal and will dictate next f/u at that time   Will plan to move to 150xl next     Health Maintenance   Topic Date Due    TETANUS VACCINE  07/26/2029    Hepatitis C Screening  Completed    Lipid Panel  Completed

## 2024-10-23 ENCOUNTER — PATIENT MESSAGE (OUTPATIENT)
Dept: INTERNAL MEDICINE | Facility: CLINIC | Age: 39
End: 2024-10-23
Payer: COMMERCIAL

## 2024-10-23 DIAGNOSIS — F43.21 ADJUSTMENT DISORDER WITH DEPRESSED MOOD: ICD-10-CM

## 2024-10-24 RX ORDER — BUPROPION HYDROCHLORIDE 150 MG/1
150 TABLET, EXTENDED RELEASE ORAL DAILY
Qty: 30 TABLET | Refills: 1 | Status: SHIPPED | OUTPATIENT
Start: 2024-10-24

## 2024-10-24 NOTE — TELEPHONE ENCOUNTER
No care due was identified.  Health [facility] Embedded Care Due Messages. Reference number: [id number].   10/24/2024 8:15:51 AM CDT

## 2024-11-13 ENCOUNTER — PATIENT MESSAGE (OUTPATIENT)
Dept: ADMINISTRATIVE | Facility: HOSPITAL | Age: 39
End: 2024-11-13
Payer: COMMERCIAL

## 2024-11-27 DIAGNOSIS — I10 HYPERTENSION, BENIGN: ICD-10-CM

## 2024-11-27 RX ORDER — NIFEDIPINE 30 MG/1
30 TABLET, EXTENDED RELEASE ORAL DAILY
Qty: 90 TABLET | Refills: 3 | Status: SHIPPED | OUTPATIENT
Start: 2024-11-27

## 2024-11-27 NOTE — TELEPHONE ENCOUNTER
No care due was identified.  Health [facility] Embedded Care Due Messages. Reference number: [id number].   11/27/2024 11:43:35 AM CST

## 2024-11-27 NOTE — TELEPHONE ENCOUNTER
Refill Routing Note   Medication(s) are not appropriate for processing by [facility] for the following reason(s):        Required vitals abnormal    [facility] action(s):  Defer               Appointments  past 12m or future 3m with PCP    Date Provider   Last Visit   10/8/2024 Maude Fernandez MD   Next Visit   Visit date not found Maude Fernandez MD   ED visits in past 90 days: 0        Note composed:1:24 PM 11/27/2024

## 2024-12-11 ENCOUNTER — PATIENT MESSAGE (OUTPATIENT)
Dept: ADMINISTRATIVE | Facility: HOSPITAL | Age: 39
End: 2024-12-11
Payer: COMMERCIAL

## 2025-03-13 ENCOUNTER — OFFICE VISIT (OUTPATIENT)
Dept: INTERNAL MEDICINE | Facility: CLINIC | Age: 40
End: 2025-03-13
Attending: INTERNAL MEDICINE
Payer: COMMERCIAL

## 2025-03-13 VITALS — BODY MASS INDEX: 35.23 KG/M2 | HEIGHT: 64 IN | WEIGHT: 206.38 LBS

## 2025-03-13 DIAGNOSIS — Z13.31 POSITIVE DEPRESSION SCREENING: ICD-10-CM

## 2025-03-13 DIAGNOSIS — F43.21 ADJUSTMENT DISORDER WITH DEPRESSED MOOD: ICD-10-CM

## 2025-03-13 DIAGNOSIS — I10 HYPERTENSION, UNSPECIFIED TYPE: Primary | ICD-10-CM

## 2025-03-13 DIAGNOSIS — Z51.81 MEDICATION MONITORING ENCOUNTER: ICD-10-CM

## 2025-03-13 RX ORDER — BUPROPION HYDROCHLORIDE 150 MG/1
150 TABLET ORAL EVERY MORNING
Qty: 90 TABLET | Refills: 3 | Status: SHIPPED | OUTPATIENT
Start: 2025-03-13 | End: 2026-03-13

## 2025-03-13 RX ORDER — HYDROCHLOROTHIAZIDE 12.5 MG/1
12.5 TABLET ORAL DAILY
Qty: 90 TABLET | Refills: 3 | Status: SHIPPED | OUTPATIENT
Start: 2025-03-13 | End: 2026-03-13

## 2025-03-13 RX ORDER — DEXAMETHASONE 1 MG/1
TABLET ORAL
Qty: 1 TABLET | Refills: 0 | Status: SHIPPED | OUTPATIENT
Start: 2025-03-13

## 2025-03-13 NOTE — PROGRESS NOTES
Subjective:   Patient ID: Meghna Aragon is a 39 y.o. female  Chief complaint:   Chief Complaint   Patient presents with    Follow-up     Med check       Hypertension  This is a chronic problem. The current episode started more than 1 year ago. The problem has been waxing and waning since onset. The problem is controlled. Associated symptoms include anxiety, headaches and malaise/fatigue. Pertinent negatives include no blurred vision, chest pain, neck pain, orthopnea, palpitations, peripheral edema, PND, shortness of breath or sweats. There are no associated agents to hypertension. Risk factors for coronary artery disease include dyslipidemia, family history, obesity and stress. Past treatments include nothing. The current treatment provides mild improvement. Compliance problems include diet and exercise.      The patient location is: [place]  The chief complaint leading to consultation is: med monitoring, htn    Visit type: audiovisual    Face to Face time with patient: 23 min  24 minutes of total time spent on the encounter, which includes face to face time and non-face to face time preparing to see the patient (eg, review of tests), Obtaining and/or reviewing separately obtained history, Documenting clinical information in the electronic or other health record, Independently interpreting results (not separately reported) and communicating results to the patient/family/caregiver, or Care coordination (not separately reported).     Each patient to whom he or she provides medical services by telemedicine is:  (1) informed of the relationship between the physician and patient and the respective role of any other health care provider with respect to management of the patient; and (2) notified that he or she may decline to receive medical services by telemedicine and may withdraw from such care at any time.    Notes:   Here for htn follow up   129/89, 126/84, 131/84  Not exercising consistently   Preparing own  "foods most of time   No current LE edema but noticed indention from sock when first inc dose of ccb  Discussed med options and eval for 2nd causes    - prev inc med to 60mg daily  Previously;   - taking procardia 30mg daily - not checking at home   Previously:  Pregnancy complicated by pre eclampsia - started on labetalol at that time   - not checking bp at home but still has bp cuff from digital MOM program     Adj do with depressed mood:   On welbutrin 150 but out for several weeks and prior sx back to baseline off of med   Med was helpful and she would like to resume as toelrated  Prev:  Here for med check for wellbutrin   Taking med for 1 week - felt insmnia for 2 days and now back to good sleep cycle - was taking at night and agrees to switch to am use    mentioned patrickda that she seemed better   She has not noticed much change yet   Mood stable - no neg SE from med   Prev:   At [facility] started low dose 100mg SR once daily   Previously:   8yo son dx with autism earlier this year - he is in several therapies   She is overwhelmed at times - tearful - depressed mood for several years but putting on a smile on most days and typically does not discuss her mood   - no si/hi/robledo  - never treated for mood or anxiety in the past   - interested in counseling at this time      Review of Systems   Constitutional:  Positive for malaise/fatigue.   Eyes:  Negative for blurred vision.   Respiratory:  Negative for shortness of breath.    Cardiovascular:  Negative for chest pain, palpitations, orthopnea and PND.   Musculoskeletal:  Negative for neck pain.   Neurological:  Positive for headaches.       Objective:  Vitals:    03/13/25 0701   Weight: 93.6 kg (206 lb 5.6 oz)   Height: 5' 3.5" (1.613 m)     Body mass index is 35.98 kg/m².    Physical Exam  Constitutional:       General: She is not in acute distress.     Appearance: She is well-developed. She is not diaphoretic.   Eyes:      General:         Right eye: No " discharge.         Left eye: No discharge.      Conjunctiva/sclera: Conjunctivae normal.   Pulmonary:      Effort: Pulmonary effort is normal. No respiratory distress.   Skin:     Findings: No erythema or rash.   Neurological:      Mental Status: She is alert and oriented to person, place, and time.   Psychiatric:         Behavior: Behavior normal.         Thought Content: Thought content normal.         Judgment: Judgment normal.         Assessment:  1. Hypertension, unspecified type    2. Medication monitoring encounter    3. Adjustment disorder with depressed mood    4. Positive depression screening        Plan:  1. Hypertension, unspecified type  -     hydroCHLOROthiazide 12.5 MG Tab; Take 1 tablet (12.5 mg total) by mouth once daily.  Dispense: 90 tablet; Refill: 3  -     Basic Metabolic Panel; Future; Expected date: 03/27/2025  -     Aldosterone/Renin Activity Ratio; Future; Expected date: 03/13/2025  -     Cortisol, 8AM; Future; Expected date: 03/13/2025  -     dexAMETHasone (DECADRON) 1 MG Tab; Take 1 dose at 11pm  Dispense: 1 tablet; Refill: 0  -     Metanephrines, Plasma Free; Future; Expected date: 03/13/2025  -     US Renal Artery Stenosis Hyperten (xpd); Future; Expected date: 03/13/2025    2. Medication monitoring encounter  -     buPROPion (WELLBUTRIN XL) 150 MG TB24 tablet; Take 1 tablet (150 mg total) by mouth every morning.  Dispense: 90 tablet; Refill: 3    3. Adjustment disorder with depressed mood  -     buPROPion (WELLBUTRIN XL) 150 MG TB24 tablet; Take 1 tablet (150 mg total) by mouth every morning.  Dispense: 90 tablet; Refill: 3    4. Positive depression screening  Comments:  I have reviewed the positive depression score which warrants active treatment with psychotherapy and/or medications.    Avoid inc ccb to limit LE edema - currently asymptomatic   Start hctz   Labs in 2 -3 weeks   Check 2nd causes   Resume wellbutrin as good effect in a few days and monitor to see if neg impacts bp      VV in 4-6 weeks for med and results review     Health Maintenance   Topic Date Due    COVID-19 Vaccine (4 - 2024-25 season) 09/01/2024    Hemoglobin A1c (Diabetic Prevention Screening)  09/04/2027    Cervical Cancer Screening  08/25/2028    TETANUS VACCINE  07/26/2029    RSV Vaccine (Age 60+ and Pregnant patients) (1 - 1-dose 75+ series) 04/30/2061    Hepatitis C Screening  Completed    Influenza Vaccine  Completed    HIV Screening  Completed    Lipid Panel  Completed    Pneumococcal Vaccines (Age 0-49)  Aged Out

## 2025-03-14 ENCOUNTER — PATIENT MESSAGE (OUTPATIENT)
Dept: INTERNAL MEDICINE | Facility: CLINIC | Age: 40
End: 2025-03-14
Payer: COMMERCIAL

## 2025-03-14 DIAGNOSIS — Z00.00 ANNUAL PHYSICAL EXAM: Primary | ICD-10-CM

## 2025-03-14 NOTE — TELEPHONE ENCOUNTER
Not due for annual labs until September     Due for labs ordered yesterday 3/13/2025 - please schedule those at the date she provided in her message

## 2025-03-19 ENCOUNTER — LAB VISIT (OUTPATIENT)
Dept: LAB | Facility: HOSPITAL | Age: 40
End: 2025-03-19
Attending: INTERNAL MEDICINE
Payer: COMMERCIAL

## 2025-03-19 ENCOUNTER — RESULTS FOLLOW-UP (OUTPATIENT)
Dept: INTERNAL MEDICINE | Facility: CLINIC | Age: 40
End: 2025-03-19

## 2025-03-19 DIAGNOSIS — I10 HYPERTENSION, UNSPECIFIED TYPE: ICD-10-CM

## 2025-03-19 LAB
ANION GAP SERPL CALC-SCNC: 11 MMOL/L (ref 8–16)
BUN SERPL-MCNC: 18 MG/DL (ref 6–20)
CALCIUM SERPL-MCNC: 9.5 MG/DL (ref 8.7–10.5)
CHLORIDE SERPL-SCNC: 104 MMOL/L (ref 95–110)
CO2 SERPL-SCNC: 22 MMOL/L (ref 23–29)
CORTIS SERPL-MCNC: 1.6 UG/DL (ref 4.3–22.4)
CREAT SERPL-MCNC: 0.9 MG/DL (ref 0.5–1.4)
EST. GFR  (NO RACE VARIABLE): >60 ML/MIN/1.73 M^2
GLUCOSE SERPL-MCNC: 105 MG/DL (ref 70–110)
POTASSIUM SERPL-SCNC: 3.9 MMOL/L (ref 3.5–5.1)
SODIUM SERPL-SCNC: 137 MMOL/L (ref 136–145)

## 2025-03-19 PROCEDURE — 83835 ASSAY OF METANEPHRINES: CPT | Performed by: INTERNAL MEDICINE

## 2025-03-19 PROCEDURE — 80048 BASIC METABOLIC PNL TOTAL CA: CPT | Performed by: INTERNAL MEDICINE

## 2025-03-19 PROCEDURE — 84244 ASSAY OF RENIN: CPT | Performed by: INTERNAL MEDICINE

## 2025-03-19 PROCEDURE — 82533 TOTAL CORTISOL: CPT | Performed by: INTERNAL MEDICINE

## 2025-03-21 ENCOUNTER — HOSPITAL ENCOUNTER (OUTPATIENT)
Dept: RADIOLOGY | Facility: HOSPITAL | Age: 40
Discharge: HOME | End: 2025-03-21
Attending: INTERNAL MEDICINE
Payer: COMMERCIAL

## 2025-03-21 DIAGNOSIS — I10 HYPERTENSION, UNSPECIFIED TYPE: ICD-10-CM

## 2025-03-21 PROCEDURE — 76770 US EXAM ABDO BACK WALL COMP: CPT | Mod: TC

## 2025-03-21 PROCEDURE — 76770 US EXAM ABDO BACK WALL COMP: CPT | Mod: 26,59,, | Performed by: RADIOLOGY

## 2025-03-21 PROCEDURE — 93975 VASCULAR STUDY: CPT | Mod: 26,,, | Performed by: RADIOLOGY

## 2025-03-24 LAB
ALDOST SERPL-MCNC: 28.8 NG/DL
ALDOST/RENIN PLAS-RTO: 16.9 RATIO
RENIN PLAS-CCNC: 1.7 NG/ML/HR

## 2025-03-26 LAB
METANEPH FREE SERPL-MCNC: 33 PG/ML
METANEPHS SERPL-MCNC: 148 PG/ML
NORMETANEPH FREE SERPL-MCNC: 115 PG/ML

## 2025-03-27 ENCOUNTER — PATIENT MESSAGE (OUTPATIENT)
Dept: INTERNAL MEDICINE | Facility: CLINIC | Age: 40
End: 2025-03-27
Payer: COMMERCIAL

## 2025-03-27 ENCOUNTER — TELEPHONE (OUTPATIENT)
Dept: INTERNAL MEDICINE | Facility: CLINIC | Age: 40
End: 2025-03-27

## 2025-03-27 DIAGNOSIS — E66.01 SEVERE OBESITY (BMI 35.0-39.9) WITH COMORBIDITY: Primary | ICD-10-CM

## 2025-03-28 RX ORDER — TIRZEPATIDE 2.5 MG/.5ML
2.5 INJECTION, SOLUTION SUBCUTANEOUS
Qty: 2 ML | Refills: 2 | Status: SHIPPED | OUTPATIENT
Start: 2025-03-28 | End: 2025-04-27

## 2025-05-05 ENCOUNTER — OFFICE VISIT (OUTPATIENT)
Dept: INTERNAL MEDICINE | Facility: CLINIC | Age: 40
End: 2025-05-05
Attending: INTERNAL MEDICINE
Payer: COMMERCIAL

## 2025-05-05 VITALS — HEIGHT: 64 IN | WEIGHT: 200 LBS | BODY MASS INDEX: 34.15 KG/M2

## 2025-05-05 DIAGNOSIS — Z71.3 ENCOUNTER FOR WEIGHT LOSS COUNSELING: ICD-10-CM

## 2025-05-05 DIAGNOSIS — I10 HYPERTENSION, BENIGN: ICD-10-CM

## 2025-05-05 DIAGNOSIS — F43.21 ADJUSTMENT DISORDER WITH DEPRESSED MOOD: ICD-10-CM

## 2025-05-05 DIAGNOSIS — Z51.81 MEDICATION MONITORING ENCOUNTER: ICD-10-CM

## 2025-05-05 DIAGNOSIS — E66.811 OBESITY (BMI 30.0-34.9): Primary | ICD-10-CM

## 2025-05-05 RX ORDER — TIRZEPATIDE 5 MG/.5ML
5 INJECTION, SOLUTION SUBCUTANEOUS
COMMUNITY
End: 2025-05-05 | Stop reason: SDUPTHER

## 2025-05-05 RX ORDER — TIRZEPATIDE 5 MG/.5ML
5 INJECTION, SOLUTION SUBCUTANEOUS
Qty: 2 ML | Refills: 1 | Status: SHIPPED | OUTPATIENT
Start: 2025-05-05

## 2025-05-05 NOTE — PROGRESS NOTES
The patient location is: [place]  The chief complaint leading to consultation is: htn, med monitoring, obesity    Visit type: audiovisual    Face to Face time with patient: 20  22 minutes of total time spent on the encounter, which includes face to face time and non-face to face time preparing to see the patient (eg, review of tests), Obtaining and/or reviewing separately obtained history, Documenting clinical information in the electronic or other health record, Independently interpreting results (not separately reported) and communicating results to the patient/family/caregiver, or Care coordination (not separately reported).     Each patient to whom he or she provides medical services by telemedicine is:  (1) informed of the relationship between the physician and patient and the respective role of any other health care provider with respect to management of the patient; and (2) notified that he or she may decline to receive medical services by telemedicine and may withdraw from such care at any time.    Notes: Subjective:   Patient ID: Mgehna Aragon is a 40 y.o. female  Chief complaint:   Chief Complaint   Patient presents with    Hypertension       HPI  Here for htn follow up   At [facility] plan was to:   Avoid increasing ccb to limit LE edema - currently asymptomatic   Start hctz   Labs in 2 -3 weeks   Check 2nd causes   Resume wellbutrin as good effect in a few days and monitor to see if neg impacts bp      VV in 4-6 weeks for med and results review      HTN:   120/ low 80s  Weight loss   exercising more consistently!  Labs for 2nd causes unremarkable   Previously:   129/89, 126/84, 131/84  Not exercising consistently  No current LE edema but noticed indention from sock when first inc dose of ccb  Discussed med options and eval for 2nd causes    - prev inc med to 60mg daily  Previously;   - taking procardia 30mg daily - not checking at home   Previously:  Pregnancy complicated by pre eclampsia - started on  "labetalol at that time   - not checking bp at home but still has bp cuff from digital MOM program      Obesity:   Sent in [employer] through [employer]   - did Start x 4 injections  - Tolerating and lost weight 6 pounds  206 and now 199    Adj do with depressed mood:   Taking wellbutrin consistently  Previously:   On welbutrin 150 but out for several weeks and prior sx back to baseline off of med   Med was helpful and she would like to resume as toelrated  Prev:  Here for med check for wellbutrin   Taking med for 1 week - felt insmnia for 2 days and now back to good sleep cycle - was taking at night and agrees to switch to am use    mentioned venkatesh that she seemed better   She has not noticed much change yet   Mood stable - no neg SE from med   Prev:   At [facility] we started low dose 100mg SR once daily   Previously:   8yo son dx with autism earlier this year - he is in several therapies   She is overwhelmed at times - tearful - depressed mood for several years but putting on a smile on most days and typically does not discuss her mood   - no si/hi/robledo  - never treated for mood or anxiety in the past   - interested in counseling at this time     Review of Systems    Objective:  Vitals:    05/05/25 0701   Weight: 90.7 kg (200 lb)   Height: 5' 3.5" (1.613 m)     Body mass index is 34.87 kg/m².    Physical Exam  Constitutional:       General: She is not in acute distress.     Appearance: She is well-developed. She is not diaphoretic.   Eyes:      General:         Right eye: No discharge.         Left eye: No discharge.      Conjunctiva/sclera: Conjunctivae normal.   Pulmonary:      Effort: Pulmonary effort is normal. No respiratory distress.   Skin:     General: Skin is warm.      Findings: No erythema.   Neurological:      Mental Status: She is alert and oriented to person, place, and time.   Psychiatric:         Behavior: Behavior normal.         Thought Content: Thought content normal.         Judgment: Judgment " normal.         Assessment:  1. Obesity (BMI 30.0-34.9)    2. Hypertension, benign    3. Medication monitoring encounter    4. Encounter for weight loss counseling    5. Adjustment disorder with depressed mood        Plan:  1. Obesity (BMI 30.0-34.9)  -     tirzepatide, weight loss, (ZEPBOUND) 5 mg/0.5 mL Soln; Inject 0.5 mLs (5 mg total) into the skin every 7 days. NPI: [id number] NCPDP: [id number]  Dispense: 2 mL; Refill: 1    2. Hypertension, benign    3. Medication monitoring encounter    4. Encounter for weight loss counseling    5. Adjustment disorder with depressed mood    Cont regimen for bp and send in readings through portal message in 4 weeks or sooner if any lows with further weight loss   Refills sent - cont zepbound at 5mg   Annual in 3 months   Doing well with wellbutrin - cont med     Health Maintenance   Topic Date Due    COVID-19 Vaccine (4 - 2024-25 season) 09/01/2024    Hemoglobin A1c (Diabetic Prevention Screening)  09/04/2027    Cervical Cancer Screening  08/25/2028    TETANUS VACCINE  07/26/2029    RSV Vaccine (Age 60+ and Pregnant patients) (1 - 1-dose 75+ series) 04/30/2061    Hepatitis C Screening  Completed    Influenza Vaccine  Completed    HIV Screening  Completed    Lipid Panel  Completed    Pneumococcal Vaccines (Age 0-49)  Aged Out     Visit today included increased complexity associated with the care of the episodic problem med monitoring, obesity addressed and managing the longitudinal care of the patient due to the serious and/or complex managed problem(s) htn.

## 2025-05-05 NOTE — PROGRESS NOTES
Staff contacted patient in regards of scheduling follow up. Patient was unavailable staff left voice message.

## 2025-05-29 DIAGNOSIS — E66.01 SEVERE OBESITY (BMI 35.0-39.9) WITH COMORBIDITY: ICD-10-CM

## 2025-05-29 RX ORDER — TIRZEPATIDE 2.5 MG/.5ML
INJECTION, SOLUTION SUBCUTANEOUS
Qty: 2 ML | Refills: 2 | OUTPATIENT
Start: 2025-05-29

## 2025-05-29 NOTE — TELEPHONE ENCOUNTER
Provider Staff:  Action required for this patient    Requires labs      Please see care gap opportunities below in Care Due Message.    Thanks!  [facility]     Appointments      Date Provider   Last Visit   5/5/2025 Maude Fernandez MD   Next Visit   Visit date not found Maude Fernandez MD     Refill Decision Note   Meghna Aragon  is requesting a refill authorization.  Brief Assessment and Rationale for Refill:  Quick Discontinue     Medication Therapy Plan:  zepbound 2.5mg/0.5mL WAS DISCONTINUED ON 05/05/25      Comments:     Note composed:10:48 AM 05/29/2025

## 2025-05-29 NOTE — TELEPHONE ENCOUNTER
Care Due:                  Date            Visit Type   Department     Provider  --------------------------------------------------------------------------------                                ESTABLISHED                              PATIENT -    [facility] INTERNAL  Last Visit: 05-      [facility]      MEDICINE       Maude Fernandez  Next Visit: None Scheduled  None         None Found                                                            Last  Test          Frequency    Reason                     Performed    Due Date  --------------------------------------------------------------------------------    HBA1C.......  6 months...  tirzepatide,.............  09- 03-    Health [facility] Embedded Care Due Messages. Reference number: [id number].   5/29/2025 8:36:16 AM CDT